# Patient Record
Sex: FEMALE | Race: WHITE | NOT HISPANIC OR LATINO | Employment: OTHER | URBAN - METROPOLITAN AREA
[De-identification: names, ages, dates, MRNs, and addresses within clinical notes are randomized per-mention and may not be internally consistent; named-entity substitution may affect disease eponyms.]

---

## 2019-01-01 ENCOUNTER — OFFICE VISIT (OUTPATIENT)
Dept: PALLIATIVE MEDICINE | Facility: CLINIC | Age: 64
End: 2019-01-01
Payer: COMMERCIAL

## 2019-01-01 ENCOUNTER — APPOINTMENT (OUTPATIENT)
Dept: RADIOLOGY | Facility: HOSPITAL | Age: 64
DRG: 948 | End: 2019-01-01
Payer: COMMERCIAL

## 2019-01-01 ENCOUNTER — HOSPITAL ENCOUNTER (OUTPATIENT)
Facility: HOSPITAL | Age: 64
Setting detail: OUTPATIENT SURGERY
Discharge: HOME | End: 2019-04-23
Attending: INTERNAL MEDICINE | Admitting: INTERNAL MEDICINE
Payer: COMMERCIAL

## 2019-01-01 ENCOUNTER — TELEPHONE (OUTPATIENT)
Dept: PALLIATIVE MEDICINE | Facility: CLINIC | Age: 64
End: 2019-01-01

## 2019-01-01 ENCOUNTER — TELEPHONE (OUTPATIENT)
Dept: HEMATOLOGY ONCOLOGY | Facility: CLINIC | Age: 64
End: 2019-01-01

## 2019-01-01 ENCOUNTER — APPOINTMENT (EMERGENCY)
Dept: CT IMAGING | Facility: HOSPITAL | Age: 64
End: 2019-01-01
Payer: COMMERCIAL

## 2019-01-01 ENCOUNTER — HOSPITAL ENCOUNTER (OUTPATIENT)
Dept: RADIOLOGY | Facility: HOSPITAL | Age: 64
Discharge: HOME | End: 2019-04-25
Attending: RADIOLOGY

## 2019-01-01 ENCOUNTER — HOSPITAL ENCOUNTER (OUTPATIENT)
Dept: MAMMOGRAPHY | Facility: CLINIC | Age: 64
Discharge: HOME | End: 2019-05-02
Payer: COMMERCIAL

## 2019-01-01 ENCOUNTER — TRANSCRIBE ORDERS (OUTPATIENT)
Dept: ADMINISTRATIVE | Facility: HOSPITAL | Age: 64
End: 2019-01-01

## 2019-01-01 ENCOUNTER — APPOINTMENT (INPATIENT)
Dept: RADIOLOGY | Facility: HOSPITAL | Age: 64
DRG: 177 | End: 2019-01-01
Payer: COMMERCIAL

## 2019-01-01 ENCOUNTER — APPOINTMENT (OUTPATIENT)
Dept: LAB | Facility: CLINIC | Age: 64
End: 2019-01-01
Payer: COMMERCIAL

## 2019-01-01 ENCOUNTER — DOCUMENTATION (OUTPATIENT)
Dept: INFUSION CENTER | Facility: HOSPITAL | Age: 64
End: 2019-01-01

## 2019-01-01 ENCOUNTER — APPOINTMENT (INPATIENT)
Dept: MRI IMAGING | Facility: HOSPITAL | Age: 64
DRG: 948 | End: 2019-01-01
Payer: COMMERCIAL

## 2019-01-01 ENCOUNTER — CONSULT (OUTPATIENT)
Dept: NEUROLOGY | Facility: CLINIC | Age: 64
End: 2019-01-01
Payer: COMMERCIAL

## 2019-01-01 ENCOUNTER — TELEPHONE (OUTPATIENT)
Dept: NEUROLOGY | Facility: CLINIC | Age: 64
End: 2019-01-01

## 2019-01-01 ENCOUNTER — APPOINTMENT (INPATIENT)
Dept: RADIOLOGY | Facility: HOSPITAL | Age: 64
DRG: 948 | End: 2019-01-01
Payer: COMMERCIAL

## 2019-01-01 ENCOUNTER — OFFICE VISIT (OUTPATIENT)
Dept: HEMATOLOGY ONCOLOGY | Facility: CLINIC | Age: 64
End: 2019-01-01
Payer: COMMERCIAL

## 2019-01-01 ENCOUNTER — APPOINTMENT (EMERGENCY)
Dept: RADIOLOGY | Facility: HOSPITAL | Age: 64
DRG: 948 | End: 2019-01-01
Payer: COMMERCIAL

## 2019-01-01 ENCOUNTER — HOSPITAL ENCOUNTER (OUTPATIENT)
Dept: INFUSION CENTER | Facility: CLINIC | Age: 64
Discharge: HOME | End: 2019-06-26
Payer: COMMERCIAL

## 2019-01-01 ENCOUNTER — OFFICE VISIT (OUTPATIENT)
Dept: GASTROENTEROLOGY | Facility: CLINIC | Age: 64
End: 2019-01-01
Payer: COMMERCIAL

## 2019-01-01 ENCOUNTER — APPOINTMENT (EMERGENCY)
Dept: CT IMAGING | Facility: HOSPITAL | Age: 64
DRG: 177 | End: 2019-01-01
Payer: COMMERCIAL

## 2019-01-01 ENCOUNTER — CONSULT (OUTPATIENT)
Dept: HEMATOLOGY ONCOLOGY | Facility: CLINIC | Age: 64
End: 2019-01-01
Payer: COMMERCIAL

## 2019-01-01 ENCOUNTER — HOSPITAL ENCOUNTER (OUTPATIENT)
Dept: RADIOLOGY | Facility: HOSPITAL | Age: 64
Discharge: HOME | DRG: 948 | End: 2019-06-11
Payer: COMMERCIAL

## 2019-01-01 ENCOUNTER — ANESTHESIA EVENT (OUTPATIENT)
Dept: PERIOP | Facility: HOSPITAL | Age: 64
End: 2019-01-01
Payer: COMMERCIAL

## 2019-01-01 ENCOUNTER — HOSPITAL ENCOUNTER (EMERGENCY)
Facility: HOSPITAL | Age: 64
Discharge: HOME | End: 2019-06-07
Attending: EMERGENCY MEDICINE | Admitting: EMERGENCY MEDICINE
Payer: COMMERCIAL

## 2019-01-01 ENCOUNTER — HOSPITAL ENCOUNTER (OUTPATIENT)
Dept: CT IMAGING | Facility: CLINIC | Age: 64
Discharge: HOME | End: 2019-05-01
Payer: COMMERCIAL

## 2019-01-01 ENCOUNTER — DOCUMENTATION (OUTPATIENT)
Dept: MEDSURG UNIT | Facility: HOSPITAL | Age: 64
End: 2019-01-01

## 2019-01-01 ENCOUNTER — APPOINTMENT (INPATIENT)
Dept: INTERVENTIONAL RADIOLOGY/VASCULAR | Facility: HOSPITAL | Age: 64
DRG: 948 | End: 2019-01-01
Attending: INTERNAL MEDICINE
Payer: COMMERCIAL

## 2019-01-01 ENCOUNTER — HOSPITAL ENCOUNTER (OUTPATIENT)
Dept: INFUSION CENTER | Facility: CLINIC | Age: 64
Discharge: HOME | End: 2019-06-28
Payer: COMMERCIAL

## 2019-01-01 ENCOUNTER — HOSPITAL ENCOUNTER (OUTPATIENT)
Dept: NEUROLOGY | Facility: HOSPITAL | Age: 64
Discharge: HOME | End: 2019-05-23
Attending: PSYCHIATRY & NEUROLOGY
Payer: COMMERCIAL

## 2019-01-01 ENCOUNTER — APPOINTMENT (OUTPATIENT)
Dept: RADIOLOGY | Facility: HOSPITAL | Age: 64
DRG: 177 | End: 2019-01-01
Payer: COMMERCIAL

## 2019-01-01 ENCOUNTER — HOSPITAL ENCOUNTER (OUTPATIENT)
Dept: RADIOLOGY | Age: 64
Discharge: HOME | End: 2019-06-03
Payer: COMMERCIAL

## 2019-01-01 ENCOUNTER — TELEPHONE (OUTPATIENT)
Dept: GASTROENTEROLOGY | Facility: CLINIC | Age: 64
End: 2019-01-01

## 2019-01-01 ENCOUNTER — ANESTHESIA (OUTPATIENT)
Dept: PERIOP | Facility: HOSPITAL | Age: 64
End: 2019-01-01
Payer: COMMERCIAL

## 2019-01-01 ENCOUNTER — HOSPITAL ENCOUNTER (INPATIENT)
Facility: HOSPITAL | Age: 64
LOS: 9 days | DRG: 177 | End: 2019-07-08
Attending: EMERGENCY MEDICINE | Admitting: FAMILY MEDICINE
Payer: COMMERCIAL

## 2019-01-01 ENCOUNTER — HOSPITAL ENCOUNTER (INPATIENT)
Facility: HOSPITAL | Age: 64
LOS: 5 days | Discharge: HOME | DRG: 948 | End: 2019-06-23
Attending: EMERGENCY MEDICINE | Admitting: INTERNAL MEDICINE
Payer: COMMERCIAL

## 2019-01-01 ENCOUNTER — HOSPITAL ENCOUNTER (OUTPATIENT)
Dept: CT IMAGING | Facility: HOSPITAL | Age: 64
Discharge: HOME | End: 2019-06-11
Attending: INTERNAL MEDICINE | Admitting: RADIOLOGY
Payer: COMMERCIAL

## 2019-01-01 VITALS
DIASTOLIC BLOOD PRESSURE: 70 MMHG | HEART RATE: 90 BPM | TEMPERATURE: 98 F | SYSTOLIC BLOOD PRESSURE: 137 MMHG | RESPIRATION RATE: 20 BRPM

## 2019-01-01 VITALS
TEMPERATURE: 97.7 F | WEIGHT: 94.8 LBS | HEART RATE: 66 BPM | SYSTOLIC BLOOD PRESSURE: 98 MMHG | OXYGEN SATURATION: 97 % | HEIGHT: 61 IN | BODY MASS INDEX: 17.9 KG/M2 | RESPIRATION RATE: 18 BRPM | DIASTOLIC BLOOD PRESSURE: 64 MMHG

## 2019-01-01 VITALS
WEIGHT: 98.99 LBS | TEMPERATURE: 97.7 F | SYSTOLIC BLOOD PRESSURE: 102 MMHG | BODY MASS INDEX: 18.22 KG/M2 | OXYGEN SATURATION: 79 % | HEIGHT: 62 IN | HEART RATE: 82 BPM | RESPIRATION RATE: 8 BRPM | DIASTOLIC BLOOD PRESSURE: 62 MMHG

## 2019-01-01 VITALS
HEIGHT: 62 IN | DIASTOLIC BLOOD PRESSURE: 90 MMHG | WEIGHT: 103.6 LBS | HEART RATE: 88 BPM | SYSTOLIC BLOOD PRESSURE: 126 MMHG | BODY MASS INDEX: 19.06 KG/M2

## 2019-01-01 VITALS
WEIGHT: 92 LBS | DIASTOLIC BLOOD PRESSURE: 56 MMHG | OXYGEN SATURATION: 98 % | BODY MASS INDEX: 17.38 KG/M2 | TEMPERATURE: 98 F | HEART RATE: 100 BPM | SYSTOLIC BLOOD PRESSURE: 110 MMHG

## 2019-01-01 VITALS
SYSTOLIC BLOOD PRESSURE: 112 MMHG | RESPIRATION RATE: 18 BRPM | BODY MASS INDEX: 16.71 KG/M2 | HEART RATE: 90 BPM | HEIGHT: 62 IN | WEIGHT: 90.83 LBS | DIASTOLIC BLOOD PRESSURE: 73 MMHG | TEMPERATURE: 98.1 F

## 2019-01-01 VITALS
WEIGHT: 98.5 LBS | HEART RATE: 101 BPM | HEIGHT: 61 IN | RESPIRATION RATE: 16 BRPM | BODY MASS INDEX: 18.6 KG/M2 | TEMPERATURE: 97.7 F | OXYGEN SATURATION: 98 %

## 2019-01-01 VITALS
SYSTOLIC BLOOD PRESSURE: 94 MMHG | TEMPERATURE: 98.2 F | DIASTOLIC BLOOD PRESSURE: 60 MMHG | OXYGEN SATURATION: 98 % | BODY MASS INDEX: 17.86 KG/M2 | HEART RATE: 78 BPM | HEIGHT: 61 IN | WEIGHT: 94.58 LBS | RESPIRATION RATE: 18 BRPM

## 2019-01-01 VITALS — RESPIRATION RATE: 16 BRPM | BODY MASS INDEX: 19.45 KG/M2 | WEIGHT: 103 LBS | TEMPERATURE: 97.6 F | HEIGHT: 61 IN

## 2019-01-01 VITALS
DIASTOLIC BLOOD PRESSURE: 74 MMHG | BODY MASS INDEX: 17.94 KG/M2 | HEART RATE: 94 BPM | TEMPERATURE: 97.9 F | HEIGHT: 61 IN | RESPIRATION RATE: 16 BRPM | WEIGHT: 95 LBS | OXYGEN SATURATION: 99 % | SYSTOLIC BLOOD PRESSURE: 100 MMHG

## 2019-01-01 VITALS
SYSTOLIC BLOOD PRESSURE: 105 MMHG | RESPIRATION RATE: 18 BRPM | TEMPERATURE: 97.5 F | HEART RATE: 82 BPM | BODY MASS INDEX: 17.69 KG/M2 | DIASTOLIC BLOOD PRESSURE: 64 MMHG | OXYGEN SATURATION: 97 % | HEIGHT: 61 IN | WEIGHT: 93.7 LBS

## 2019-01-01 VITALS
HEIGHT: 64 IN | BODY MASS INDEX: 18.1 KG/M2 | DIASTOLIC BLOOD PRESSURE: 80 MMHG | HEART RATE: 88 BPM | RESPIRATION RATE: 16 BRPM | WEIGHT: 106 LBS | SYSTOLIC BLOOD PRESSURE: 124 MMHG

## 2019-01-01 VITALS
BODY MASS INDEX: 18.86 KG/M2 | SYSTOLIC BLOOD PRESSURE: 104 MMHG | HEART RATE: 77 BPM | WEIGHT: 99.87 LBS | RESPIRATION RATE: 16 BRPM | HEIGHT: 61 IN | TEMPERATURE: 97.8 F | DIASTOLIC BLOOD PRESSURE: 71 MMHG | OXYGEN SATURATION: 99 %

## 2019-01-01 VITALS — BODY MASS INDEX: 18.31 KG/M2 | HEIGHT: 61 IN | WEIGHT: 97 LBS

## 2019-01-01 DIAGNOSIS — C15.4 MALIGNANT NEOPLASM OF MIDDLE THIRD OF ESOPHAGUS (HCC): Primary | ICD-10-CM

## 2019-01-01 DIAGNOSIS — R52 INTRACTABLE PAIN: Primary | ICD-10-CM

## 2019-01-01 DIAGNOSIS — Z72.0 TOBACCO ABUSE: ICD-10-CM

## 2019-01-01 DIAGNOSIS — Z85.3 HISTORY OF BREAST CANCER: ICD-10-CM

## 2019-01-01 DIAGNOSIS — G40.909 SEIZURE DISORDER (HCC): Primary | ICD-10-CM

## 2019-01-01 DIAGNOSIS — R49.1 LOSS OF VOICE: Primary | ICD-10-CM

## 2019-01-01 DIAGNOSIS — C15.4 MALIGNANT NEOPLASM OF MIDDLE THIRD OF ESOPHAGUS (HCC): ICD-10-CM

## 2019-01-01 DIAGNOSIS — K21.9 GASTRO-ESOPHAGEAL REFLUX DISEASE WITHOUT ESOPHAGITIS: ICD-10-CM

## 2019-01-01 DIAGNOSIS — G40.909 SEIZURE DISORDER (HCC): ICD-10-CM

## 2019-01-01 DIAGNOSIS — G89.3 CANCER ASSOCIATED PAIN: ICD-10-CM

## 2019-01-01 DIAGNOSIS — R53.1 WEAKNESS: ICD-10-CM

## 2019-01-01 DIAGNOSIS — J69.0 ASPIRATION PNEUMONIA (HCC): ICD-10-CM

## 2019-01-01 DIAGNOSIS — C15.9 ESOPHAGEAL CANCER (HCC): ICD-10-CM

## 2019-01-01 DIAGNOSIS — R10.84 GENERALIZED ABDOMINAL PAIN: Primary | ICD-10-CM

## 2019-01-01 DIAGNOSIS — L03.313 CELLULITIS OF CHEST WALL: ICD-10-CM

## 2019-01-01 DIAGNOSIS — Z76.89 REFERRAL OF PATIENT WITHOUT EXAMINATION OR TREATMENT: ICD-10-CM

## 2019-01-01 DIAGNOSIS — J95.811 PNEUMOTHORAX AFTER BIOPSY: ICD-10-CM

## 2019-01-01 DIAGNOSIS — Z51.11 ENCOUNTER FOR CHEMOTHERAPY MANAGEMENT: ICD-10-CM

## 2019-01-01 DIAGNOSIS — K52.9 COLITIS: ICD-10-CM

## 2019-01-01 DIAGNOSIS — R10.84 GENERALIZED ABDOMINAL PAIN: ICD-10-CM

## 2019-01-01 DIAGNOSIS — G89.3 CANCER ASSOCIATED PAIN: Primary | ICD-10-CM

## 2019-01-01 DIAGNOSIS — Z85.01 PERSONAL HISTORY OF MALIGNANT NEOPLASM OF ESOPHAGUS: ICD-10-CM

## 2019-01-01 DIAGNOSIS — R10.9 CHRONIC ABDOMINAL PAIN: Primary | ICD-10-CM

## 2019-01-01 DIAGNOSIS — E44.0 MODERATE PROTEIN-CALORIE MALNUTRITION (HCC): ICD-10-CM

## 2019-01-01 DIAGNOSIS — E86.0 DEHYDRATION: ICD-10-CM

## 2019-01-01 DIAGNOSIS — J95.811 PNEUMOTHORAX OF LEFT LUNG AFTER BIOPSY: ICD-10-CM

## 2019-01-01 DIAGNOSIS — J90 PLEURAL EFFUSION, LEFT: ICD-10-CM

## 2019-01-01 DIAGNOSIS — R10.9 ABDOMINAL PAIN: ICD-10-CM

## 2019-01-01 DIAGNOSIS — T40.2X5A THERAPEUTIC OPIOID-INDUCED CONSTIPATION (OIC): ICD-10-CM

## 2019-01-01 DIAGNOSIS — E87.6 HYPOKALEMIA: ICD-10-CM

## 2019-01-01 DIAGNOSIS — R11.2 INTRACTABLE NAUSEA AND VOMITING: Primary | ICD-10-CM

## 2019-01-01 DIAGNOSIS — G89.29 CHRONIC ABDOMINAL PAIN: Primary | ICD-10-CM

## 2019-01-01 DIAGNOSIS — K21.9 GASTROESOPHAGEAL REFLUX DISEASE, ESOPHAGITIS PRESENCE NOT SPECIFIED: Primary | ICD-10-CM

## 2019-01-01 DIAGNOSIS — Z85.01 HISTORY OF ESOPHAGEAL CANCER: ICD-10-CM

## 2019-01-01 DIAGNOSIS — K59.03 THERAPEUTIC OPIOID-INDUCED CONSTIPATION (OIC): ICD-10-CM

## 2019-01-01 LAB
ALBUMIN SERPL BCP-MCNC: 2 G/DL (ref 3.5–5)
ALBUMIN SERPL BCP-MCNC: 2.4 G/DL (ref 3.5–5)
ALBUMIN SERPL BCP-MCNC: 2.7 G/DL (ref 3.5–5)
ALBUMIN SERPL BCP-MCNC: 3 G/DL (ref 3.5–5)
ALBUMIN SERPL BCP-MCNC: 3.3 G/DL (ref 3.5–5)
ALBUMIN SERPL BCP-MCNC: 3.4 G/DL (ref 3.5–5)
ALP SERPL-CCNC: 104 U/L (ref 46–116)
ALP SERPL-CCNC: 65 U/L (ref 46–116)
ALP SERPL-CCNC: 76 U/L (ref 46–116)
ALP SERPL-CCNC: 84 U/L (ref 46–116)
ALP SERPL-CCNC: 96 U/L (ref 46–116)
ALP SERPL-CCNC: 97 U/L (ref 46–116)
ALT SERPL W P-5'-P-CCNC: 10 U/L (ref 12–78)
ALT SERPL W P-5'-P-CCNC: 10 U/L (ref 12–78)
ALT SERPL W P-5'-P-CCNC: 14 U/L (ref 12–78)
ALT SERPL W P-5'-P-CCNC: 6 U/L (ref 12–78)
ALT SERPL W P-5'-P-CCNC: 8 U/L (ref 12–78)
ALT SERPL W P-5'-P-CCNC: 8 U/L (ref 12–78)
ANION GAP SERPL CALCULATED.3IONS-SCNC: 10 MMOL/L (ref 4–13)
ANION GAP SERPL CALCULATED.3IONS-SCNC: 11 MMOL/L (ref 4–13)
ANION GAP SERPL CALCULATED.3IONS-SCNC: 12 MMOL/L (ref 4–13)
ANION GAP SERPL CALCULATED.3IONS-SCNC: 4 MMOL/L (ref 4–13)
ANION GAP SERPL CALCULATED.3IONS-SCNC: 5 MMOL/L (ref 4–13)
ANION GAP SERPL CALCULATED.3IONS-SCNC: 6 MMOL/L (ref 4–13)
ANION GAP SERPL CALCULATED.3IONS-SCNC: 8 MMOL/L (ref 4–13)
ANION GAP SERPL CALCULATED.3IONS-SCNC: 8 MMOL/L (ref 4–13)
ANION GAP SERPL CALCULATED.3IONS-SCNC: 9 MMOL/L (ref 4–13)
AST SERPL W P-5'-P-CCNC: 12 U/L (ref 5–45)
AST SERPL W P-5'-P-CCNC: 13 U/L (ref 5–45)
AST SERPL W P-5'-P-CCNC: 14 U/L (ref 5–45)
AST SERPL W P-5'-P-CCNC: 15 U/L (ref 5–45)
AST SERPL W P-5'-P-CCNC: 19 U/L (ref 5–45)
AST SERPL W P-5'-P-CCNC: 27 U/L (ref 5–45)
ATRIAL RATE: 92 BPM
BACTERIA BLD CULT: NORMAL
BACTERIA BLD CULT: NORMAL
BACTERIA UR QL AUTO: ABNORMAL /HPF
BACTERIA UR QL AUTO: ABNORMAL /HPF
BASOPHILS # BLD AUTO: 0.01 THOUSANDS/ΜL (ref 0–0.1)
BASOPHILS # BLD AUTO: 0.01 THOUSANDS/ΜL (ref 0–0.1)
BASOPHILS # BLD AUTO: 0.02 THOUSANDS/ΜL (ref 0–0.1)
BASOPHILS # BLD AUTO: 0.03 THOUSANDS/ΜL (ref 0–0.1)
BASOPHILS # BLD AUTO: 0.04 THOUSANDS/ΜL (ref 0–0.1)
BASOPHILS # BLD AUTO: 0.05 THOUSANDS/ΜL (ref 0–0.1)
BASOPHILS # BLD AUTO: 0.05 THOUSANDS/ΜL (ref 0–0.1)
BASOPHILS # BLD AUTO: 0.07 THOUSANDS/ΜL (ref 0–0.1)
BASOPHILS # BLD MANUAL: 0 THOUSAND/UL (ref 0–0.1)
BASOPHILS # BLD MANUAL: 0 THOUSAND/UL (ref 0–0.1)
BASOPHILS NFR BLD AUTO: 0 % (ref 0–1)
BASOPHILS NFR BLD AUTO: 1 % (ref 0–1)
BASOPHILS NFR MAR MANUAL: 0 % (ref 0–1)
BASOPHILS NFR MAR MANUAL: 0 % (ref 0–1)
BILIRUB DIRECT SERPL-MCNC: 0.1 MG/DL (ref 0–0.2)
BILIRUB DIRECT SERPL-MCNC: 0.11 MG/DL (ref 0–0.2)
BILIRUB SERPL-MCNC: 0.34 MG/DL (ref 0.2–1)
BILIRUB SERPL-MCNC: 0.4 MG/DL (ref 0.2–1)
BILIRUB SERPL-MCNC: 0.4 MG/DL (ref 0.2–1)
BILIRUB SERPL-MCNC: 0.5 MG/DL (ref 0.2–1)
BILIRUB SERPL-MCNC: 0.5 MG/DL (ref 0.2–1)
BILIRUB SERPL-MCNC: 0.7 MG/DL (ref 0.2–1)
BILIRUB UR QL STRIP: NEGATIVE
BILIRUB UR QL STRIP: NEGATIVE
BUN SERPL-MCNC: 10 MG/DL (ref 5–25)
BUN SERPL-MCNC: 10 MG/DL (ref 5–25)
BUN SERPL-MCNC: 11 MG/DL (ref 5–25)
BUN SERPL-MCNC: 13 MG/DL (ref 5–25)
BUN SERPL-MCNC: 14 MG/DL (ref 5–25)
BUN SERPL-MCNC: 16 MG/DL (ref 5–25)
BUN SERPL-MCNC: 17 MG/DL (ref 5–25)
BUN SERPL-MCNC: 18 MG/DL (ref 5–25)
BUN SERPL-MCNC: 5 MG/DL (ref 5–25)
BUN SERPL-MCNC: 5 MG/DL (ref 5–25)
BUN SERPL-MCNC: 7 MG/DL (ref 5–25)
BUN SERPL-MCNC: 7 MG/DL (ref 5–25)
BUN SERPL-MCNC: 8 MG/DL (ref 5–25)
BUN SERPL-MCNC: 9 MG/DL (ref 5–25)
CALCIUM SERPL-MCNC: 10.1 MG/DL (ref 8.3–10.1)
CALCIUM SERPL-MCNC: 10.3 MG/DL (ref 8.3–10.1)
CALCIUM SERPL-MCNC: 10.6 MG/DL (ref 8.3–10.1)
CALCIUM SERPL-MCNC: 10.8 MG/DL (ref 8.3–10.1)
CALCIUM SERPL-MCNC: 9.1 MG/DL (ref 8.3–10.1)
CALCIUM SERPL-MCNC: 9.2 MG/DL (ref 8.3–10.1)
CALCIUM SERPL-MCNC: 9.3 MG/DL (ref 8.3–10.1)
CALCIUM SERPL-MCNC: 9.3 MG/DL (ref 8.3–10.1)
CALCIUM SERPL-MCNC: 9.4 MG/DL (ref 8.3–10.1)
CALCIUM SERPL-MCNC: 9.5 MG/DL (ref 8.3–10.1)
CALCIUM SERPL-MCNC: 9.5 MG/DL (ref 8.3–10.1)
CALCIUM SERPL-MCNC: 9.6 MG/DL (ref 8.3–10.1)
CALCIUM SERPL-MCNC: 9.7 MG/DL (ref 8.3–10.1)
CALCIUM SERPL-MCNC: 9.8 MG/DL (ref 8.3–10.1)
CALCIUM SERPL-MCNC: 9.9 MG/DL (ref 8.3–10.1)
CHLORIDE SERPL-SCNC: 100 MMOL/L (ref 100–108)
CHLORIDE SERPL-SCNC: 101 MMOL/L (ref 100–108)
CHLORIDE SERPL-SCNC: 102 MMOL/L (ref 100–108)
CHLORIDE SERPL-SCNC: 102 MMOL/L (ref 100–108)
CHLORIDE SERPL-SCNC: 103 MMOL/L (ref 100–108)
CHLORIDE SERPL-SCNC: 103 MMOL/L (ref 100–108)
CHLORIDE SERPL-SCNC: 105 MMOL/L (ref 100–108)
CHLORIDE SERPL-SCNC: 108 MMOL/L (ref 100–108)
CHLORIDE SERPL-SCNC: 94 MMOL/L (ref 100–108)
CHLORIDE SERPL-SCNC: 97 MMOL/L (ref 100–108)
CHLORIDE SERPL-SCNC: 98 MMOL/L (ref 100–108)
CHLORIDE SERPL-SCNC: 99 MMOL/L (ref 100–108)
CHLORIDE SERPL-SCNC: 99 MMOL/L (ref 100–108)
CLARITY UR: CLEAR
CLARITY UR: CLEAR
CO2 SERPL-SCNC: 27 MMOL/L (ref 21–32)
CO2 SERPL-SCNC: 27 MMOL/L (ref 21–32)
CO2 SERPL-SCNC: 28 MMOL/L (ref 21–32)
CO2 SERPL-SCNC: 29 MMOL/L (ref 21–32)
CO2 SERPL-SCNC: 31 MMOL/L (ref 21–32)
CO2 SERPL-SCNC: 32 MMOL/L (ref 21–32)
CO2 SERPL-SCNC: 33 MMOL/L (ref 21–32)
CO2 SERPL-SCNC: 34 MMOL/L (ref 21–32)
CO2 SERPL-SCNC: 34 MMOL/L (ref 21–32)
CO2 SERPL-SCNC: 36 MMOL/L (ref 21–32)
CO2 SERPL-SCNC: 36 MMOL/L (ref 21–32)
COLOR UR: YELLOW
COLOR UR: YELLOW
CREAT SERPL-MCNC: 0.44 MG/DL (ref 0.6–1.3)
CREAT SERPL-MCNC: 0.46 MG/DL (ref 0.6–1.3)
CREAT SERPL-MCNC: 0.53 MG/DL (ref 0.6–1.3)
CREAT SERPL-MCNC: 0.57 MG/DL (ref 0.6–1.3)
CREAT SERPL-MCNC: 0.58 MG/DL (ref 0.6–1.3)
CREAT SERPL-MCNC: 0.59 MG/DL (ref 0.6–1.3)
CREAT SERPL-MCNC: 0.59 MG/DL (ref 0.6–1.3)
CREAT SERPL-MCNC: 0.6 MG/DL (ref 0.6–1.3)
CREAT SERPL-MCNC: 0.61 MG/DL (ref 0.6–1.3)
CREAT SERPL-MCNC: 0.63 MG/DL (ref 0.6–1.3)
CREAT SERPL-MCNC: 0.64 MG/DL (ref 0.6–1.3)
CREAT SERPL-MCNC: 0.68 MG/DL (ref 0.6–1.3)
CREAT SERPL-MCNC: 0.72 MG/DL (ref 0.6–1.3)
EOSINOPHIL # BLD AUTO: 0 THOUSAND/ΜL (ref 0–0.61)
EOSINOPHIL # BLD AUTO: 0.08 THOUSAND/ΜL (ref 0–0.61)
EOSINOPHIL # BLD AUTO: 0.19 THOUSAND/ΜL (ref 0–0.61)
EOSINOPHIL # BLD AUTO: 0.28 THOUSAND/ΜL (ref 0–0.61)
EOSINOPHIL # BLD AUTO: 0.38 THOUSAND/ΜL (ref 0–0.61)
EOSINOPHIL # BLD MANUAL: 0 THOUSAND/UL (ref 0–0.4)
EOSINOPHIL # BLD MANUAL: 0 THOUSAND/UL (ref 0–0.4)
EOSINOPHIL NFR BLD AUTO: 0 % (ref 0–6)
EOSINOPHIL NFR BLD AUTO: 1 % (ref 0–6)
EOSINOPHIL NFR BLD AUTO: 2 % (ref 0–6)
EOSINOPHIL NFR BLD AUTO: 2 % (ref 0–6)
EOSINOPHIL NFR BLD AUTO: 3 % (ref 0–6)
EOSINOPHIL NFR BLD MANUAL: 0 % (ref 0–6)
EOSINOPHIL NFR BLD MANUAL: 0 % (ref 0–6)
ERYTHROCYTE [DISTWIDTH] IN BLOOD BY AUTOMATED COUNT: 11.5 % (ref 11.6–15.1)
ERYTHROCYTE [DISTWIDTH] IN BLOOD BY AUTOMATED COUNT: 11.6 % (ref 11.6–15.1)
ERYTHROCYTE [DISTWIDTH] IN BLOOD BY AUTOMATED COUNT: 11.6 % (ref 11.6–15.1)
ERYTHROCYTE [DISTWIDTH] IN BLOOD BY AUTOMATED COUNT: 11.7 % (ref 11.6–15.1)
ERYTHROCYTE [DISTWIDTH] IN BLOOD BY AUTOMATED COUNT: 11.8 % (ref 11.6–15.1)
ERYTHROCYTE [DISTWIDTH] IN BLOOD BY AUTOMATED COUNT: 11.8 % (ref 11.6–15.1)
ERYTHROCYTE [DISTWIDTH] IN BLOOD BY AUTOMATED COUNT: 11.9 % (ref 11.6–15.1)
ERYTHROCYTE [DISTWIDTH] IN BLOOD BY AUTOMATED COUNT: 12 % (ref 11.6–15.1)
ERYTHROCYTE [DISTWIDTH] IN BLOOD BY AUTOMATED COUNT: 12 % (ref 11.6–15.1)
ERYTHROCYTE [DISTWIDTH] IN BLOOD BY AUTOMATED COUNT: 12.2 % (ref 11.6–15.1)
GFR SERPL CREATININE-BSD FRML MDRD: 100 ML/MIN/1.73SQ M
GFR SERPL CREATININE-BSD FRML MDRD: 102 ML/MIN/1.73SQ M
GFR SERPL CREATININE-BSD FRML MDRD: 107 ML/MIN/1.73SQ M
GFR SERPL CREATININE-BSD FRML MDRD: 109 ML/MIN/1.73SQ M
GFR SERPL CREATININE-BSD FRML MDRD: 90 ML/MIN/1.73SQ M
GFR SERPL CREATININE-BSD FRML MDRD: 94 ML/MIN/1.73SQ M
GFR SERPL CREATININE-BSD FRML MDRD: 96 ML/MIN/1.73SQ M
GFR SERPL CREATININE-BSD FRML MDRD: 96 ML/MIN/1.73SQ M
GFR SERPL CREATININE-BSD FRML MDRD: 97 ML/MIN/1.73SQ M
GFR SERPL CREATININE-BSD FRML MDRD: 98 ML/MIN/1.73SQ M
GFR SERPL CREATININE-BSD FRML MDRD: 99 ML/MIN/1.73SQ M
GLUCOSE P FAST SERPL-MCNC: 107 MG/DL (ref 65–99)
GLUCOSE SERPL-MCNC: 101 MG/DL (ref 65–140)
GLUCOSE SERPL-MCNC: 103 MG/DL (ref 65–140)
GLUCOSE SERPL-MCNC: 103 MG/DL (ref 65–140)
GLUCOSE SERPL-MCNC: 111 MG/DL (ref 65–140)
GLUCOSE SERPL-MCNC: 111 MG/DL (ref 65–140)
GLUCOSE SERPL-MCNC: 115 MG/DL (ref 65–140)
GLUCOSE SERPL-MCNC: 116 MG/DL (ref 65–140)
GLUCOSE SERPL-MCNC: 117 MG/DL (ref 65–140)
GLUCOSE SERPL-MCNC: 117 MG/DL (ref 65–140)
GLUCOSE SERPL-MCNC: 121 MG/DL (ref 65–140)
GLUCOSE SERPL-MCNC: 126 MG/DL (ref 65–140)
GLUCOSE SERPL-MCNC: 128 MG/DL (ref 65–140)
GLUCOSE SERPL-MCNC: 129 MG/DL (ref 65–140)
GLUCOSE SERPL-MCNC: 158 MG/DL (ref 65–140)
GLUCOSE SERPL-MCNC: 83 MG/DL (ref 65–140)
GLUCOSE SERPL-MCNC: 85 MG/DL (ref 65–140)
GLUCOSE SERPL-MCNC: 87 MG/DL (ref 65–140)
GLUCOSE SERPL-MCNC: 88 MG/DL (ref 65–140)
GLUCOSE SERPL-MCNC: 90 MG/DL (ref 65–140)
GLUCOSE SERPL-MCNC: 90 MG/DL (ref 65–140)
GLUCOSE SERPL-MCNC: 92 MG/DL (ref 65–140)
GLUCOSE UR STRIP-MCNC: NEGATIVE MG/DL
GLUCOSE UR STRIP-MCNC: NEGATIVE MG/DL
HCT VFR BLD AUTO: 31.6 % (ref 34.8–46.1)
HCT VFR BLD AUTO: 32.8 % (ref 34.8–46.1)
HCT VFR BLD AUTO: 33.3 % (ref 34.8–46.1)
HCT VFR BLD AUTO: 34.8 % (ref 34.8–46.1)
HCT VFR BLD AUTO: 35.5 % (ref 34.8–46.1)
HCT VFR BLD AUTO: 36.2 % (ref 34.8–46.1)
HCT VFR BLD AUTO: 36.3 % (ref 34.8–46.1)
HCT VFR BLD AUTO: 36.5 % (ref 34.8–46.1)
HCT VFR BLD AUTO: 36.9 % (ref 34.8–46.1)
HCT VFR BLD AUTO: 38.1 % (ref 34.8–46.1)
HCT VFR BLD AUTO: 38.4 % (ref 34.8–46.1)
HCT VFR BLD AUTO: 42.1 % (ref 34.8–46.1)
HCT VFR BLD AUTO: 45.7 % (ref 34.8–46.1)
HGB BLD-MCNC: 10.4 G/DL (ref 11.5–15.4)
HGB BLD-MCNC: 10.7 G/DL (ref 11.5–15.4)
HGB BLD-MCNC: 11 G/DL (ref 11.5–15.4)
HGB BLD-MCNC: 11 G/DL (ref 11.5–15.4)
HGB BLD-MCNC: 11.2 G/DL (ref 11.5–15.4)
HGB BLD-MCNC: 11.2 G/DL (ref 11.5–15.4)
HGB BLD-MCNC: 11.8 G/DL (ref 11.5–15.4)
HGB BLD-MCNC: 11.8 G/DL (ref 11.5–15.4)
HGB BLD-MCNC: 12.1 G/DL (ref 11.5–15.4)
HGB BLD-MCNC: 12.1 G/DL (ref 11.5–15.4)
HGB BLD-MCNC: 12.4 G/DL (ref 11.5–15.4)
HGB BLD-MCNC: 12.7 G/DL (ref 11.5–15.4)
HGB BLD-MCNC: 12.9 G/DL (ref 11.5–15.4)
HGB BLD-MCNC: 14.2 G/DL (ref 11.5–15.4)
HGB BLD-MCNC: 15 G/DL (ref 11.5–15.4)
HGB UR QL STRIP.AUTO: NEGATIVE
HGB UR QL STRIP.AUTO: NEGATIVE
IMM GRANULOCYTES # BLD AUTO: 0.02 THOUSAND/UL (ref 0–0.2)
IMM GRANULOCYTES # BLD AUTO: 0.05 THOUSAND/UL (ref 0–0.2)
IMM GRANULOCYTES # BLD AUTO: 0.05 THOUSAND/UL (ref 0–0.2)
IMM GRANULOCYTES # BLD AUTO: 0.06 THOUSAND/UL (ref 0–0.2)
IMM GRANULOCYTES # BLD AUTO: 0.07 THOUSAND/UL (ref 0–0.2)
IMM GRANULOCYTES # BLD AUTO: 0.08 THOUSAND/UL (ref 0–0.2)
IMM GRANULOCYTES # BLD AUTO: 0.1 THOUSAND/UL (ref 0–0.2)
IMM GRANULOCYTES # BLD AUTO: 0.14 THOUSAND/UL (ref 0–0.2)
IMM GRANULOCYTES NFR BLD AUTO: 0 % (ref 0–2)
IMM GRANULOCYTES NFR BLD AUTO: 1 % (ref 0–2)
INR PPP: 0.98 (ref 0.86–1.17)
KETONES UR STRIP-MCNC: NEGATIVE MG/DL
KETONES UR STRIP-MCNC: NEGATIVE MG/DL
LACTATE SERPL-SCNC: 1.2 MMOL/L (ref 0.5–2)
LACTATE SERPL-SCNC: 1.5 MMOL/L (ref 0.5–2)
LEUKOCYTE ESTERASE UR QL STRIP: ABNORMAL
LEUKOCYTE ESTERASE UR QL STRIP: NEGATIVE
LIPASE SERPL-CCNC: 70 U/L (ref 73–393)
LYMPHOCYTES # BLD AUTO: 0.28 THOUSANDS/ΜL (ref 0.6–4.47)
LYMPHOCYTES # BLD AUTO: 0.3 THOUSAND/UL (ref 0.6–4.47)
LYMPHOCYTES # BLD AUTO: 0.32 THOUSANDS/ΜL (ref 0.6–4.47)
LYMPHOCYTES # BLD AUTO: 0.35 THOUSANDS/ΜL (ref 0.6–4.47)
LYMPHOCYTES # BLD AUTO: 0.39 THOUSANDS/ΜL (ref 0.6–4.47)
LYMPHOCYTES # BLD AUTO: 0.99 THOUSAND/UL (ref 0.6–4.47)
LYMPHOCYTES # BLD AUTO: 1.08 THOUSANDS/ΜL (ref 0.6–4.47)
LYMPHOCYTES # BLD AUTO: 1.22 THOUSANDS/ΜL (ref 0.6–4.47)
LYMPHOCYTES # BLD AUTO: 1.67 THOUSANDS/ΜL (ref 0.6–4.47)
LYMPHOCYTES # BLD AUTO: 1.68 THOUSANDS/ΜL (ref 0.6–4.47)
LYMPHOCYTES # BLD AUTO: 2 % (ref 14–44)
LYMPHOCYTES # BLD AUTO: 6 % (ref 14–44)
LYMPHOCYTES NFR BLD AUTO: 10 % (ref 14–44)
LYMPHOCYTES NFR BLD AUTO: 12 % (ref 14–44)
LYMPHOCYTES NFR BLD AUTO: 19 % (ref 14–44)
LYMPHOCYTES NFR BLD AUTO: 3 % (ref 14–44)
LYMPHOCYTES NFR BLD AUTO: 5 % (ref 14–44)
LYMPHOCYTES NFR BLD AUTO: 9 % (ref 14–44)
MAGNESIUM SERPL-MCNC: 1.4 MG/DL (ref 1.6–2.6)
MAGNESIUM SERPL-MCNC: 1.4 MG/DL (ref 1.6–2.6)
MAGNESIUM SERPL-MCNC: 1.5 MG/DL (ref 1.6–2.6)
MAGNESIUM SERPL-MCNC: 1.5 MG/DL (ref 1.6–2.6)
MAGNESIUM SERPL-MCNC: 1.7 MG/DL (ref 1.6–2.6)
MAGNESIUM SERPL-MCNC: 1.9 MG/DL (ref 1.6–2.6)
MCH RBC QN AUTO: 31.4 PG (ref 26.8–34.3)
MCH RBC QN AUTO: 31.6 PG (ref 26.8–34.3)
MCH RBC QN AUTO: 31.7 PG (ref 26.8–34.3)
MCH RBC QN AUTO: 31.8 PG (ref 26.8–34.3)
MCH RBC QN AUTO: 31.9 PG (ref 26.8–34.3)
MCH RBC QN AUTO: 31.9 PG (ref 26.8–34.3)
MCH RBC QN AUTO: 32 PG (ref 26.8–34.3)
MCH RBC QN AUTO: 32 PG (ref 26.8–34.3)
MCH RBC QN AUTO: 32.1 PG (ref 26.8–34.3)
MCH RBC QN AUTO: 32.1 PG (ref 26.8–34.3)
MCH RBC QN AUTO: 32.2 PG (ref 26.8–34.3)
MCH RBC QN AUTO: 32.2 PG (ref 26.8–34.3)
MCH RBC QN AUTO: 32.3 PG (ref 26.8–34.3)
MCH RBC QN AUTO: 32.8 PG (ref 26.8–34.3)
MCH RBC QN AUTO: 32.9 PG (ref 26.8–34.3)
MCHC RBC AUTO-ENTMCNC: 32.2 G/DL (ref 31.4–37.4)
MCHC RBC AUTO-ENTMCNC: 32.3 G/DL (ref 31.4–37.4)
MCHC RBC AUTO-ENTMCNC: 32.5 G/DL (ref 31.4–37.4)
MCHC RBC AUTO-ENTMCNC: 32.6 G/DL (ref 31.4–37.4)
MCHC RBC AUTO-ENTMCNC: 32.8 G/DL (ref 31.4–37.4)
MCHC RBC AUTO-ENTMCNC: 32.9 G/DL (ref 31.4–37.4)
MCHC RBC AUTO-ENTMCNC: 33 G/DL (ref 31.4–37.4)
MCHC RBC AUTO-ENTMCNC: 33.3 G/DL (ref 31.4–37.4)
MCHC RBC AUTO-ENTMCNC: 33.4 G/DL (ref 31.4–37.4)
MCHC RBC AUTO-ENTMCNC: 33.5 G/DL (ref 31.4–37.4)
MCHC RBC AUTO-ENTMCNC: 33.6 G/DL (ref 31.4–37.4)
MCHC RBC AUTO-ENTMCNC: 33.6 G/DL (ref 31.4–37.4)
MCHC RBC AUTO-ENTMCNC: 33.7 G/DL (ref 31.4–37.4)
MCHC RBC AUTO-ENTMCNC: 34.1 G/DL (ref 31.4–37.4)
MCHC RBC AUTO-ENTMCNC: 34.1 G/DL (ref 31.4–37.4)
MCV RBC AUTO: 100 FL (ref 82–98)
MCV RBC AUTO: 100 FL (ref 82–98)
MCV RBC AUTO: 93 FL (ref 82–98)
MCV RBC AUTO: 94 FL (ref 82–98)
MCV RBC AUTO: 95 FL (ref 82–98)
MCV RBC AUTO: 96 FL (ref 82–98)
MCV RBC AUTO: 97 FL (ref 82–98)
MCV RBC AUTO: 98 FL (ref 82–98)
MCV RBC AUTO: 98 FL (ref 82–98)
MONOCYTES # BLD AUTO: 0.07 THOUSAND/ΜL (ref 0.17–1.22)
MONOCYTES # BLD AUTO: 0.14 THOUSAND/ΜL (ref 0.17–1.22)
MONOCYTES # BLD AUTO: 0.2 THOUSAND/ΜL (ref 0.17–1.22)
MONOCYTES # BLD AUTO: 0.2 THOUSAND/ΜL (ref 0.17–1.22)
MONOCYTES # BLD AUTO: 0.3 THOUSAND/UL (ref 0–1.22)
MONOCYTES # BLD AUTO: 0.33 THOUSAND/UL (ref 0–1.22)
MONOCYTES # BLD AUTO: 0.87 THOUSAND/ΜL (ref 0.17–1.22)
MONOCYTES # BLD AUTO: 0.94 THOUSAND/ΜL (ref 0.17–1.22)
MONOCYTES # BLD AUTO: 1.16 THOUSAND/ΜL (ref 0.17–1.22)
MONOCYTES # BLD AUTO: 1.33 THOUSAND/ΜL (ref 0.17–1.22)
MONOCYTES NFR BLD AUTO: 1 % (ref 4–12)
MONOCYTES NFR BLD AUTO: 1 % (ref 4–12)
MONOCYTES NFR BLD AUTO: 10 % (ref 4–12)
MONOCYTES NFR BLD AUTO: 2 % (ref 4–12)
MONOCYTES NFR BLD AUTO: 2 % (ref 4–12)
MONOCYTES NFR BLD AUTO: 8 % (ref 4–12)
MONOCYTES NFR BLD: 2 % (ref 4–12)
MONOCYTES NFR BLD: 2 % (ref 4–12)
MUCOUS THREADS UR QL AUTO: ABNORMAL
NEUTROPHILS # BLD AUTO: 10.52 THOUSANDS/ΜL (ref 1.85–7.62)
NEUTROPHILS # BLD AUTO: 11.81 THOUSANDS/ΜL (ref 1.85–7.62)
NEUTROPHILS # BLD AUTO: 12.71 THOUSANDS/ΜL (ref 1.85–7.62)
NEUTROPHILS # BLD AUTO: 5.89 THOUSANDS/ΜL (ref 1.85–7.62)
NEUTROPHILS # BLD AUTO: 7.16 THOUSANDS/ΜL (ref 1.85–7.62)
NEUTROPHILS # BLD AUTO: 8.82 THOUSANDS/ΜL (ref 1.85–7.62)
NEUTROPHILS # BLD AUTO: 9.36 THOUSANDS/ΜL (ref 1.85–7.62)
NEUTROPHILS # BLD AUTO: 9.82 THOUSANDS/ΜL (ref 1.85–7.62)
NEUTROPHILS # BLD MANUAL: 14.6 THOUSAND/UL (ref 1.85–7.62)
NEUTROPHILS # BLD MANUAL: 15.17 THOUSAND/UL (ref 1.85–7.62)
NEUTS BAND NFR BLD MANUAL: 4 % (ref 0–8)
NEUTS BAND NFR BLD MANUAL: 4 % (ref 0–8)
NEUTS SEG NFR BLD AUTO: 68 % (ref 43–75)
NEUTS SEG NFR BLD AUTO: 77 % (ref 43–75)
NEUTS SEG NFR BLD AUTO: 78 % (ref 43–75)
NEUTS SEG NFR BLD AUTO: 79 % (ref 43–75)
NEUTS SEG NFR BLD AUTO: 88 % (ref 43–75)
NEUTS SEG NFR BLD AUTO: 92 % (ref 43–75)
NEUTS SEG NFR BLD AUTO: 93 % (ref 43–75)
NEUTS SEG NFR BLD AUTO: 94 % (ref 43–75)
NEUTS SEG NFR BLD AUTO: 94 % (ref 43–75)
NEUTS SEG NFR BLD AUTO: 95 % (ref 43–75)
NITRITE UR QL STRIP: NEGATIVE
NITRITE UR QL STRIP: NEGATIVE
NON-SQ EPI CELLS URNS QL MICRO: ABNORMAL /HPF
NON-SQ EPI CELLS URNS QL MICRO: ABNORMAL /HPF
NRBC BLD AUTO-RTO: 0 /100 WBCS
P AXIS: 45 DEGREES
PH UR STRIP.AUTO: 6.5 [PH]
PH UR STRIP.AUTO: 7 [PH]
PHOSPHATE SERPL-MCNC: 2.2 MG/DL (ref 2.3–4.1)
PLATELET # BLD AUTO: 220 THOUSANDS/UL (ref 149–390)
PLATELET # BLD AUTO: 227 THOUSANDS/UL (ref 149–390)
PLATELET # BLD AUTO: 231 THOUSANDS/UL (ref 149–390)
PLATELET # BLD AUTO: 267 THOUSANDS/UL (ref 149–390)
PLATELET # BLD AUTO: 281 THOUSANDS/UL (ref 149–390)
PLATELET # BLD AUTO: 373 THOUSANDS/UL (ref 149–390)
PLATELET # BLD AUTO: 376 THOUSANDS/UL (ref 149–390)
PLATELET # BLD AUTO: 400 THOUSANDS/UL (ref 149–390)
PLATELET # BLD AUTO: 404 THOUSANDS/UL (ref 149–390)
PLATELET # BLD AUTO: 421 THOUSANDS/UL (ref 149–390)
PLATELET # BLD AUTO: 462 THOUSANDS/UL (ref 149–390)
PLATELET # BLD AUTO: 475 THOUSANDS/UL (ref 149–390)
PLATELET # BLD AUTO: 479 THOUSANDS/UL (ref 149–390)
PLATELET # BLD AUTO: 480 THOUSANDS/UL (ref 149–390)
PLATELET # BLD AUTO: 501 THOUSANDS/UL (ref 149–390)
PLATELET BLD QL SMEAR: ABNORMAL
PLATELET BLD QL SMEAR: ADEQUATE
PMV BLD AUTO: 8.1 FL (ref 8.9–12.7)
PMV BLD AUTO: 8.1 FL (ref 8.9–12.7)
PMV BLD AUTO: 8.2 FL (ref 8.9–12.7)
PMV BLD AUTO: 8.4 FL (ref 8.9–12.7)
PMV BLD AUTO: 8.5 FL (ref 8.9–12.7)
PMV BLD AUTO: 8.8 FL (ref 8.9–12.7)
PMV BLD AUTO: 8.9 FL (ref 8.9–12.7)
PMV BLD AUTO: 9.3 FL (ref 8.9–12.7)
PMV BLD AUTO: 9.3 FL (ref 8.9–12.7)
POTASSIUM SERPL-SCNC: 1.9 MMOL/L (ref 3.5–5.3)
POTASSIUM SERPL-SCNC: 2 MMOL/L (ref 3.5–5.3)
POTASSIUM SERPL-SCNC: 2.6 MMOL/L (ref 3.5–5.3)
POTASSIUM SERPL-SCNC: 2.7 MMOL/L (ref 3.5–5.3)
POTASSIUM SERPL-SCNC: 3.3 MMOL/L (ref 3.5–5.3)
POTASSIUM SERPL-SCNC: 3.4 MMOL/L (ref 3.5–5.3)
POTASSIUM SERPL-SCNC: 3.4 MMOL/L (ref 3.5–5.3)
POTASSIUM SERPL-SCNC: 3.5 MMOL/L (ref 3.5–5.3)
POTASSIUM SERPL-SCNC: 3.6 MMOL/L (ref 3.5–5.3)
POTASSIUM SERPL-SCNC: 3.7 MMOL/L (ref 3.5–5.3)
POTASSIUM SERPL-SCNC: 3.8 MMOL/L (ref 3.5–5.3)
POTASSIUM SERPL-SCNC: 3.9 MMOL/L (ref 3.5–5.3)
POTASSIUM SERPL-SCNC: 4.1 MMOL/L (ref 3.5–5.3)
POTASSIUM SERPL-SCNC: 4.6 MMOL/L (ref 3.5–5.3)
POTASSIUM SERPL-SCNC: 4.7 MMOL/L (ref 3.5–5.3)
PR INTERVAL: 140 MS
PROCALCITONIN SERPL-MCNC: 0.19 NG/ML
PROT SERPL-MCNC: 5.9 G/DL (ref 6.4–8.2)
PROT SERPL-MCNC: 6.9 G/DL (ref 6.4–8.2)
PROT SERPL-MCNC: 7.4 G/DL (ref 6.4–8.2)
PROT SERPL-MCNC: 7.6 G/DL (ref 6.4–8.2)
PROT SERPL-MCNC: 7.7 G/DL (ref 6.4–8.2)
PROT SERPL-MCNC: 7.8 G/DL (ref 6.4–8.2)
PROT UR STRIP-MCNC: ABNORMAL MG/DL
PROT UR STRIP-MCNC: NEGATIVE MG/DL
PROTHROMBIN TIME: 12.9 SECONDS (ref 11.8–14.2)
QRS AXIS: 89 DEGREES
QRSD INTERVAL: 70 MS
QT INTERVAL: 358 MS
QTC INTERVAL: 442 MS
RBC # BLD AUTO: 3.28 MILLION/UL (ref 3.81–5.12)
RBC # BLD AUTO: 3.39 MILLION/UL (ref 3.81–5.12)
RBC # BLD AUTO: 3.43 MILLION/UL (ref 3.81–5.12)
RBC # BLD AUTO: 3.45 MILLION/UL (ref 3.81–5.12)
RBC # BLD AUTO: 3.47 MILLION/UL (ref 3.81–5.12)
RBC # BLD AUTO: 3.51 MILLION/UL (ref 3.81–5.12)
RBC # BLD AUTO: 3.71 MILLION/UL (ref 3.81–5.12)
RBC # BLD AUTO: 3.76 MILLION/UL (ref 3.81–5.12)
RBC # BLD AUTO: 3.78 MILLION/UL (ref 3.81–5.12)
RBC # BLD AUTO: 3.78 MILLION/UL (ref 3.81–5.12)
RBC # BLD AUTO: 3.86 MILLION/UL (ref 3.81–5.12)
RBC # BLD AUTO: 3.95 MILLION/UL (ref 3.81–5.12)
RBC # BLD AUTO: 4.01 MILLION/UL (ref 3.81–5.12)
RBC # BLD AUTO: 4.31 MILLION/UL (ref 3.81–5.12)
RBC # BLD AUTO: 4.58 MILLION/UL (ref 3.81–5.12)
RBC #/AREA URNS AUTO: ABNORMAL /HPF
RBC #/AREA URNS AUTO: ABNORMAL /HPF
SCAN RESULT: NORMAL
SODIUM SERPL-SCNC: 132 MMOL/L (ref 136–145)
SODIUM SERPL-SCNC: 135 MMOL/L (ref 136–145)
SODIUM SERPL-SCNC: 135 MMOL/L (ref 136–145)
SODIUM SERPL-SCNC: 136 MMOL/L (ref 136–145)
SODIUM SERPL-SCNC: 137 MMOL/L (ref 136–145)
SODIUM SERPL-SCNC: 138 MMOL/L (ref 136–145)
SODIUM SERPL-SCNC: 140 MMOL/L (ref 136–145)
SODIUM SERPL-SCNC: 141 MMOL/L (ref 136–145)
SODIUM SERPL-SCNC: 141 MMOL/L (ref 136–145)
SODIUM SERPL-SCNC: 142 MMOL/L (ref 136–145)
SODIUM SERPL-SCNC: 145 MMOL/L (ref 136–145)
SODIUM SERPL-SCNC: 145 MMOL/L (ref 136–145)
SODIUM SERPL-SCNC: 147 MMOL/L (ref 136–145)
SP GR UR STRIP.AUTO: 1.02 (ref 1–1.03)
SP GR UR STRIP.AUTO: <=1.005 (ref 1–1.03)
T WAVE AXIS: 49 DEGREES
TOTAL CELLS COUNTED SPEC: 100
TOTAL CELLS COUNTED SPEC: 100
UROBILINOGEN UR QL STRIP.AUTO: 0.2 E.U./DL
UROBILINOGEN UR QL STRIP.AUTO: 0.2 E.U./DL
VANCOMYCIN TROUGH SERPL-MCNC: 4 UG/ML (ref 10–20)
VANCOMYCIN TROUGH SERPL-MCNC: 9.4 UG/ML (ref 10–20)
VENTRICULAR RATE: 92 BPM
WBC # BLD AUTO: 10.48 THOUSAND/UL (ref 4.31–10.16)
WBC # BLD AUTO: 11.46 THOUSAND/UL (ref 4.31–10.16)
WBC # BLD AUTO: 12.11 THOUSAND/UL (ref 4.31–10.16)
WBC # BLD AUTO: 12.35 THOUSAND/UL (ref 4.31–10.16)
WBC # BLD AUTO: 12.38 THOUSAND/UL (ref 4.31–10.16)
WBC # BLD AUTO: 13.47 THOUSAND/UL (ref 4.31–10.16)
WBC # BLD AUTO: 13.7 THOUSAND/UL (ref 4.31–10.16)
WBC # BLD AUTO: 15.21 THOUSAND/UL (ref 4.31–10.16)
WBC # BLD AUTO: 16.49 THOUSAND/UL (ref 4.31–10.16)
WBC # BLD AUTO: 6.22 THOUSAND/UL (ref 4.31–10.16)
WBC # BLD AUTO: 7.65 THOUSAND/UL (ref 4.31–10.16)
WBC # BLD AUTO: 8.5 THOUSAND/UL (ref 4.31–10.16)
WBC # BLD AUTO: 8.72 THOUSAND/UL (ref 4.31–10.16)
WBC # BLD AUTO: 9.29 THOUSAND/UL (ref 4.31–10.16)
WBC # BLD AUTO: 9.39 THOUSAND/UL (ref 4.31–10.16)
WBC #/AREA URNS AUTO: ABNORMAL /HPF
WBC #/AREA URNS AUTO: ABNORMAL /HPF

## 2019-01-01 PROCEDURE — 82948 REAGENT STRIP/BLOOD GLUCOSE: CPT

## 2019-01-01 PROCEDURE — 77012 CT SCAN FOR NEEDLE BIOPSY: CPT

## 2019-01-01 PROCEDURE — 96375 TX/PRO/DX INJ NEW DRUG ADDON: CPT

## 2019-01-01 PROCEDURE — 99285 EMERGENCY DEPT VISIT HI MDM: CPT

## 2019-01-01 PROCEDURE — 99284 EMERGENCY DEPT VISIT MOD MDM: CPT

## 2019-01-01 PROCEDURE — 99244 OFF/OP CNSLTJ NEW/EST MOD 40: CPT | Performed by: INTERNAL MEDICINE

## 2019-01-01 PROCEDURE — 99232 SBSQ HOSP IP/OBS MODERATE 35: CPT | Performed by: INTERNAL MEDICINE

## 2019-01-01 PROCEDURE — G0498 CHEMO EXTEND IV INFUS W/PUMP: HCPCS

## 2019-01-01 PROCEDURE — 80048 BASIC METABOLIC PNL TOTAL CA: CPT | Performed by: INTERNAL MEDICINE

## 2019-01-01 PROCEDURE — 99254 IP/OBS CNSLTJ NEW/EST MOD 60: CPT | Performed by: INTERNAL MEDICINE

## 2019-01-01 PROCEDURE — 94760 N-INVAS EAR/PLS OXIMETRY 1: CPT

## 2019-01-01 PROCEDURE — 99220 PR INITIAL OBSERVATION CARE/DAY 70 MINUTES: CPT | Performed by: HOSPITALIST

## 2019-01-01 PROCEDURE — 96411 CHEMO IV PUSH ADDL DRUG: CPT

## 2019-01-01 PROCEDURE — 96368 THER/DIAG CONCURRENT INF: CPT

## 2019-01-01 PROCEDURE — 94668 MNPJ CHEST WALL SBSQ: CPT

## 2019-01-01 PROCEDURE — 99232 SBSQ HOSP IP/OBS MODERATE 35: CPT | Performed by: PHYSICIAN ASSISTANT

## 2019-01-01 PROCEDURE — 85007 BL SMEAR W/DIFF WBC COUNT: CPT | Performed by: EMERGENCY MEDICINE

## 2019-01-01 PROCEDURE — 80053 COMPREHEN METABOLIC PANEL: CPT | Performed by: FAMILY MEDICINE

## 2019-01-01 PROCEDURE — 88305 TISSUE EXAM BY PATHOLOGIST: CPT | Performed by: PATHOLOGY

## 2019-01-01 PROCEDURE — 85027 COMPLETE CBC AUTOMATED: CPT | Performed by: INTERNAL MEDICINE

## 2019-01-01 PROCEDURE — 85027 COMPLETE CBC AUTOMATED: CPT | Performed by: PHYSICIAN ASSISTANT

## 2019-01-01 PROCEDURE — 85027 COMPLETE CBC AUTOMATED: CPT | Performed by: EMERGENCY MEDICINE

## 2019-01-01 PROCEDURE — 71045 X-RAY EXAM CHEST 1 VIEW: CPT

## 2019-01-01 PROCEDURE — 96361 HYDRATE IV INFUSION ADD-ON: CPT

## 2019-01-01 PROCEDURE — 85610 PROTHROMBIN TIME: CPT | Performed by: RADIOLOGY

## 2019-01-01 PROCEDURE — 99291 CRITICAL CARE FIRST HOUR: CPT | Performed by: ANESTHESIOLOGY

## 2019-01-01 PROCEDURE — 99243 OFF/OP CNSLTJ NEW/EST LOW 30: CPT | Performed by: PSYCHIATRY & NEUROLOGY

## 2019-01-01 PROCEDURE — 85025 COMPLETE CBC W/AUTO DIFF WBC: CPT | Performed by: INTERNAL MEDICINE

## 2019-01-01 PROCEDURE — C9113 INJ PANTOPRAZOLE SODIUM, VIA: HCPCS | Performed by: PHYSICIAN ASSISTANT

## 2019-01-01 PROCEDURE — 32405 HB PERCUT BX LUNG/MEDIASTINUM: CPT

## 2019-01-01 PROCEDURE — 88341 IMHCHEM/IMCYTCHM EA ADD ANTB: CPT | Performed by: PATHOLOGY

## 2019-01-01 PROCEDURE — 43239 EGD BIOPSY SINGLE/MULTIPLE: CPT | Performed by: INTERNAL MEDICINE

## 2019-01-01 PROCEDURE — 96415 CHEMO IV INFUSION ADDL HR: CPT

## 2019-01-01 PROCEDURE — 74160 CT ABDOMEN W/CONTRAST: CPT

## 2019-01-01 PROCEDURE — 32405 PR BIOPSY LUNG/MEDIASTINUM PERCUTANEOUS NEEDLE: CPT | Performed by: RADIOLOGY

## 2019-01-01 PROCEDURE — 80053 COMPREHEN METABOLIC PANEL: CPT | Performed by: EMERGENCY MEDICINE

## 2019-01-01 PROCEDURE — 99283 EMERGENCY DEPT VISIT LOW MDM: CPT | Performed by: EMERGENCY MEDICINE

## 2019-01-01 PROCEDURE — 70553 MRI BRAIN STEM W/O & W/DYE: CPT

## 2019-01-01 PROCEDURE — 83735 ASSAY OF MAGNESIUM: CPT | Performed by: INTERNAL MEDICINE

## 2019-01-01 PROCEDURE — 77067 SCR MAMMO BI INCL CAD: CPT

## 2019-01-01 PROCEDURE — 96367 TX/PROPH/DG ADDL SEQ IV INF: CPT

## 2019-01-01 PROCEDURE — 36415 COLL VENOUS BLD VENIPUNCTURE: CPT | Performed by: EMERGENCY MEDICINE

## 2019-01-01 PROCEDURE — 99233 SBSQ HOSP IP/OBS HIGH 50: CPT | Performed by: INTERNAL MEDICINE

## 2019-01-01 PROCEDURE — 94640 AIRWAY INHALATION TREATMENT: CPT

## 2019-01-01 PROCEDURE — 88342 IMHCHEM/IMCYTCHM 1ST ANTB: CPT | Performed by: PATHOLOGY

## 2019-01-01 PROCEDURE — C9113 INJ PANTOPRAZOLE SODIUM, VIA: HCPCS | Performed by: EMERGENCY MEDICINE

## 2019-01-01 PROCEDURE — 84145 PROCALCITONIN (PCT): CPT | Performed by: INTERNAL MEDICINE

## 2019-01-01 PROCEDURE — 96374 THER/PROPH/DIAG INJ IV PUSH: CPT

## 2019-01-01 PROCEDURE — C1788 PORT, INDWELLING, IMP: HCPCS

## 2019-01-01 PROCEDURE — 85025 COMPLETE CBC W/AUTO DIFF WBC: CPT | Performed by: FAMILY MEDICINE

## 2019-01-01 PROCEDURE — 71046 X-RAY EXAM CHEST 2 VIEWS: CPT

## 2019-01-01 PROCEDURE — 78815 PET IMAGE W/CT SKULL-THIGH: CPT

## 2019-01-01 PROCEDURE — 85025 COMPLETE CBC W/AUTO DIFF WBC: CPT | Performed by: EMERGENCY MEDICINE

## 2019-01-01 PROCEDURE — 99232 SBSQ HOSP IP/OBS MODERATE 35: CPT | Performed by: FAMILY MEDICINE

## 2019-01-01 PROCEDURE — 93005 ELECTROCARDIOGRAM TRACING: CPT

## 2019-01-01 PROCEDURE — 84100 ASSAY OF PHOSPHORUS: CPT | Performed by: FAMILY MEDICINE

## 2019-01-01 PROCEDURE — 71260 CT THORAX DX C+: CPT

## 2019-01-01 PROCEDURE — 83735 ASSAY OF MAGNESIUM: CPT | Performed by: EMERGENCY MEDICINE

## 2019-01-01 PROCEDURE — 94660 CPAP INITIATION&MGMT: CPT

## 2019-01-01 PROCEDURE — 87040 BLOOD CULTURE FOR BACTERIA: CPT | Performed by: INTERNAL MEDICINE

## 2019-01-01 PROCEDURE — 99219 PR INITIAL OBSERVATION CARE/DAY 50 MINUTES: CPT | Performed by: INTERNAL MEDICINE

## 2019-01-01 PROCEDURE — 80053 COMPREHEN METABOLIC PANEL: CPT | Performed by: INTERNAL MEDICINE

## 2019-01-01 PROCEDURE — 76937 US GUIDE VASCULAR ACCESS: CPT | Performed by: RADIOLOGY

## 2019-01-01 PROCEDURE — 36561 INSERT TUNNELED CV CATH: CPT

## 2019-01-01 PROCEDURE — 77063 BREAST TOMOSYNTHESIS BI: CPT

## 2019-01-01 PROCEDURE — A9585 GADOBUTROL INJECTION: HCPCS | Performed by: INTERNAL MEDICINE

## 2019-01-01 PROCEDURE — 99222 1ST HOSP IP/OBS MODERATE 55: CPT | Performed by: INTERNAL MEDICINE

## 2019-01-01 PROCEDURE — 99214 OFFICE O/P EST MOD 30 MIN: CPT | Performed by: INTERNAL MEDICINE

## 2019-01-01 PROCEDURE — 99152 MOD SED SAME PHYS/QHP 5/>YRS: CPT | Performed by: RADIOLOGY

## 2019-01-01 PROCEDURE — 83605 ASSAY OF LACTIC ACID: CPT | Performed by: FAMILY MEDICINE

## 2019-01-01 PROCEDURE — 83605 ASSAY OF LACTIC ACID: CPT | Performed by: EMERGENCY MEDICINE

## 2019-01-01 PROCEDURE — 77012 CT SCAN FOR NEEDLE BIOPSY: CPT | Performed by: RADIOLOGY

## 2019-01-01 PROCEDURE — 96413 CHEMO IV INFUSION 1 HR: CPT

## 2019-01-01 PROCEDURE — 84132 ASSAY OF SERUM POTASSIUM: CPT | Performed by: FAMILY MEDICINE

## 2019-01-01 PROCEDURE — NC001 PR NO CHARGE: Performed by: NURSE PRACTITIONER

## 2019-01-01 PROCEDURE — 99152 MOD SED SAME PHYS/QHP 5/>YRS: CPT

## 2019-01-01 PROCEDURE — 99215 OFFICE O/P EST HI 40 MIN: CPT | Performed by: INTERNAL MEDICINE

## 2019-01-01 PROCEDURE — 95816 EEG AWAKE AND DROWSY: CPT

## 2019-01-01 PROCEDURE — 80048 BASIC METABOLIC PNL TOTAL CA: CPT | Performed by: FAMILY MEDICINE

## 2019-01-01 PROCEDURE — 76937 US GUIDE VASCULAR ACCESS: CPT

## 2019-01-01 PROCEDURE — 99292 CRITICAL CARE ADDL 30 MIN: CPT | Performed by: ANESTHESIOLOGY

## 2019-01-01 PROCEDURE — 80048 BASIC METABOLIC PNL TOTAL CA: CPT | Performed by: EMERGENCY MEDICINE

## 2019-01-01 PROCEDURE — 80076 HEPATIC FUNCTION PANEL: CPT | Performed by: EMERGENCY MEDICINE

## 2019-01-01 PROCEDURE — 96376 TX/PRO/DX INJ SAME DRUG ADON: CPT

## 2019-01-01 PROCEDURE — 36561 INSERT TUNNELED CV CATH: CPT | Performed by: RADIOLOGY

## 2019-01-01 PROCEDURE — 74177 CT ABD & PELVIS W/CONTRAST: CPT

## 2019-01-01 PROCEDURE — 80202 ASSAY OF VANCOMYCIN: CPT | Performed by: FAMILY MEDICINE

## 2019-01-01 PROCEDURE — 77001 FLUOROGUIDE FOR VEIN DEVICE: CPT

## 2019-01-01 PROCEDURE — 81001 URINALYSIS AUTO W/SCOPE: CPT | Performed by: EMERGENCY MEDICINE

## 2019-01-01 PROCEDURE — 85007 BL SMEAR W/DIFF WBC COUNT: CPT | Performed by: INTERNAL MEDICINE

## 2019-01-01 PROCEDURE — 99204 OFFICE O/P NEW MOD 45 MIN: CPT | Performed by: INTERNAL MEDICINE

## 2019-01-01 PROCEDURE — 99238 HOSP IP/OBS DSCHRG MGMT 30/<: CPT | Performed by: INTERNAL MEDICINE

## 2019-01-01 PROCEDURE — 77001 FLUOROGUIDE FOR VEIN DEVICE: CPT | Performed by: RADIOLOGY

## 2019-01-01 PROCEDURE — 88333 PATH CONSLTJ SURG CYTO XM 1: CPT | Performed by: PATHOLOGY

## 2019-01-01 PROCEDURE — 93010 ELECTROCARDIOGRAM REPORT: CPT | Performed by: INTERNAL MEDICINE

## 2019-01-01 PROCEDURE — 80048 BASIC METABOLIC PNL TOTAL CA: CPT | Performed by: PHYSICIAN ASSISTANT

## 2019-01-01 PROCEDURE — 36415 COLL VENOUS BLD VENIPUNCTURE: CPT | Performed by: INTERNAL MEDICINE

## 2019-01-01 PROCEDURE — 02HV33Z INSERTION OF INFUSION DEVICE INTO SUPERIOR VENA CAVA, PERCUTANEOUS APPROACH: ICD-10-PCS | Performed by: RADIOLOGY

## 2019-01-01 PROCEDURE — 99255 IP/OBS CONSLTJ NEW/EST HI 80: CPT | Performed by: INTERNAL MEDICINE

## 2019-01-01 PROCEDURE — 83690 ASSAY OF LIPASE: CPT | Performed by: EMERGENCY MEDICINE

## 2019-01-01 PROCEDURE — A9552 F18 FDG: HCPCS

## 2019-01-01 PROCEDURE — 95816 EEG AWAKE AND DROWSY: CPT | Performed by: PSYCHIATRY & NEUROLOGY

## 2019-01-01 PROCEDURE — 99285 EMERGENCY DEPT VISIT HI MDM: CPT | Performed by: EMERGENCY MEDICINE

## 2019-01-01 PROCEDURE — 99232 SBSQ HOSP IP/OBS MODERATE 35: CPT | Performed by: NURSE PRACTITIONER

## 2019-01-01 PROCEDURE — 99233 SBSQ HOSP IP/OBS HIGH 50: CPT | Performed by: NURSE PRACTITIONER

## 2019-01-01 PROCEDURE — 99223 1ST HOSP IP/OBS HIGH 75: CPT | Performed by: INTERNAL MEDICINE

## 2019-01-01 PROCEDURE — 99153 MOD SED SAME PHYS/QHP EA: CPT

## 2019-01-01 RX ORDER — POTASSIUM CHLORIDE 14.9 MG/ML
20 INJECTION INTRAVENOUS ONCE
Status: COMPLETED | OUTPATIENT
Start: 2019-01-01 | End: 2019-01-01

## 2019-01-01 RX ORDER — FLUOROURACIL 50 MG/ML
400 INJECTION, SOLUTION INTRAVENOUS ONCE
Status: CANCELLED | OUTPATIENT
Start: 2019-01-01

## 2019-01-01 RX ORDER — ONDANSETRON 2 MG/ML
4 INJECTION INTRAMUSCULAR; INTRAVENOUS ONCE
Status: COMPLETED | OUTPATIENT
Start: 2019-01-01 | End: 2019-01-01

## 2019-01-01 RX ORDER — OXYCODONE HYDROCHLORIDE 5 MG/1
5 TABLET ORAL ONCE
Status: COMPLETED | OUTPATIENT
Start: 2019-01-01 | End: 2019-01-01

## 2019-01-01 RX ORDER — IPRATROPIUM BROMIDE AND ALBUTEROL SULFATE 2.5; .5 MG/3ML; MG/3ML
3 SOLUTION RESPIRATORY (INHALATION) 4 TIMES DAILY PRN
Status: DISCONTINUED | OUTPATIENT
Start: 2019-01-01 | End: 2019-01-01

## 2019-01-01 RX ORDER — LEVALBUTEROL 1.25 MG/.5ML
1.25 SOLUTION, CONCENTRATE RESPIRATORY (INHALATION)
Status: DISCONTINUED | OUTPATIENT
Start: 2019-01-01 | End: 2019-01-01

## 2019-01-01 RX ORDER — DICYCLOMINE HCL 20 MG
20 TABLET ORAL ONCE
Status: COMPLETED | OUTPATIENT
Start: 2019-01-01 | End: 2019-01-01

## 2019-01-01 RX ORDER — METOCLOPRAMIDE HYDROCHLORIDE 5 MG/ML
10 INJECTION INTRAMUSCULAR; INTRAVENOUS EVERY 6 HOURS PRN
Status: DISCONTINUED | OUTPATIENT
Start: 2019-01-01 | End: 2019-01-01

## 2019-01-01 RX ORDER — PROCHLORPERAZINE MALEATE 10 MG
5 TABLET ORAL EVERY 6 HOURS PRN
Status: DISCONTINUED | OUTPATIENT
Start: 2019-01-01 | End: 2019-01-01

## 2019-01-01 RX ORDER — FENTANYL CITRATE 50 UG/ML
INJECTION, SOLUTION INTRAMUSCULAR; INTRAVENOUS CODE/TRAUMA/SEDATION MEDICATION
Status: COMPLETED | OUTPATIENT
Start: 2019-01-01 | End: 2019-01-01

## 2019-01-01 RX ORDER — POTASSIUM CHLORIDE 14.9 MG/ML
20 INJECTION INTRAVENOUS
Status: COMPLETED | OUTPATIENT
Start: 2019-01-01 | End: 2019-01-01

## 2019-01-01 RX ORDER — SACCHAROMYCES BOULARDII 250 MG
250 CAPSULE ORAL 2 TIMES DAILY
Status: DISCONTINUED | OUTPATIENT
Start: 2019-01-01 | End: 2019-01-01 | Stop reason: HOSPADM

## 2019-01-01 RX ORDER — DEXTROSE MONOHYDRATE 50 MG/ML
20 INJECTION, SOLUTION INTRAVENOUS ONCE
Status: COMPLETED | OUTPATIENT
Start: 2019-01-01 | End: 2019-01-01

## 2019-01-01 RX ORDER — NICOTINE 21 MG/24HR
1 PATCH, TRANSDERMAL 24 HOURS TRANSDERMAL DAILY
Qty: 28 PATCH | Refills: 0 | Status: SHIPPED | OUTPATIENT
Start: 2019-01-01 | End: 2019-01-01 | Stop reason: HOSPADM

## 2019-01-01 RX ORDER — FLUOROURACIL 50 MG/ML
400 INJECTION, SOLUTION INTRAVENOUS ONCE
Status: CANCELLED | OUTPATIENT
Start: 2019-08-06

## 2019-01-01 RX ORDER — METOCLOPRAMIDE HYDROCHLORIDE 5 MG/ML
10 INJECTION INTRAMUSCULAR; INTRAVENOUS ONCE
Status: COMPLETED | OUTPATIENT
Start: 2019-01-01 | End: 2019-01-01

## 2019-01-01 RX ORDER — SODIUM CHLORIDE 9 MG/ML
75 INJECTION, SOLUTION INTRAVENOUS CONTINUOUS
Status: DISCONTINUED | OUTPATIENT
Start: 2019-01-01 | End: 2019-01-01

## 2019-01-01 RX ORDER — MORPHINE SULFATE 4 MG/ML
4 INJECTION, SOLUTION INTRAMUSCULAR; INTRAVENOUS EVERY 6 HOURS PRN
Status: DISCONTINUED | OUTPATIENT
Start: 2019-01-01 | End: 2019-01-01

## 2019-01-01 RX ORDER — GABAPENTIN 300 MG/1
600 CAPSULE ORAL 2 TIMES DAILY
Status: DISCONTINUED | OUTPATIENT
Start: 2019-01-01 | End: 2019-01-01

## 2019-01-01 RX ORDER — BISACODYL 10 MG
10 SUPPOSITORY, RECTAL RECTAL ONCE
Status: DISCONTINUED | OUTPATIENT
Start: 2019-01-01 | End: 2019-01-01

## 2019-01-01 RX ORDER — MAGNESIUM SULFATE HEPTAHYDRATE 40 MG/ML
2 INJECTION, SOLUTION INTRAVENOUS DAILY PRN
Status: DISCONTINUED | OUTPATIENT
Start: 2019-01-01 | End: 2019-01-01

## 2019-01-01 RX ORDER — ONDANSETRON 4 MG/1
4 TABLET, ORALLY DISINTEGRATING ORAL EVERY 8 HOURS PRN
Status: DISCONTINUED | OUTPATIENT
Start: 2019-01-01 | End: 2019-01-01

## 2019-01-01 RX ORDER — PROMETHAZINE HYDROCHLORIDE 25 MG/ML
25 INJECTION, SOLUTION INTRAMUSCULAR; INTRAVENOUS EVERY 6 HOURS PRN
Status: DISCONTINUED | OUTPATIENT
Start: 2019-01-01 | End: 2019-01-01 | Stop reason: HOSPADM

## 2019-01-01 RX ORDER — LORAZEPAM 2 MG/ML
0.5 INJECTION INTRAMUSCULAR EVERY 2 HOUR PRN
Status: DISCONTINUED | OUTPATIENT
Start: 2019-01-01 | End: 2019-01-01 | Stop reason: HOSPADM

## 2019-01-01 RX ORDER — OXYCODONE HYDROCHLORIDE 10 MG/1
10 TABLET ORAL EVERY 4 HOURS PRN
Status: DISPENSED | OUTPATIENT
Start: 2019-01-01 | End: 2019-01-01

## 2019-01-01 RX ORDER — PROMETHAZINE HYDROCHLORIDE 25 MG/ML
25 INJECTION, SOLUTION INTRAMUSCULAR; INTRAVENOUS EVERY 6 HOURS PRN
Status: DISCONTINUED | OUTPATIENT
Start: 2019-01-01 | End: 2019-01-01

## 2019-01-01 RX ORDER — LIDOCAINE HYDROCHLORIDE 20 MG/ML
15 SOLUTION OROPHARYNGEAL ONCE
Status: COMPLETED | OUTPATIENT
Start: 2019-01-01 | End: 2019-01-01

## 2019-01-01 RX ORDER — SACCHAROMYCES BOULARDII 250 MG
250 CAPSULE ORAL 2 TIMES DAILY
Status: DISCONTINUED | OUTPATIENT
Start: 2019-01-01 | End: 2019-01-01

## 2019-01-01 RX ORDER — POTASSIUM CHLORIDE 29.8 MG/ML
40 INJECTION INTRAVENOUS ONCE
Status: COMPLETED | OUTPATIENT
Start: 2019-01-01 | End: 2019-01-01

## 2019-01-01 RX ORDER — MAGNESIUM SULFATE HEPTAHYDRATE 40 MG/ML
2 INJECTION, SOLUTION INTRAVENOUS ONCE
Status: COMPLETED | OUTPATIENT
Start: 2019-01-01 | End: 2019-01-01

## 2019-01-01 RX ORDER — PROPOFOL 10 MG/ML
INJECTION, EMULSION INTRAVENOUS AS NEEDED
Status: DISCONTINUED | OUTPATIENT
Start: 2019-01-01 | End: 2019-01-01 | Stop reason: SURG

## 2019-01-01 RX ORDER — HALOPERIDOL 5 MG/ML
2 INJECTION INTRAMUSCULAR EVERY 4 HOURS PRN
Status: DISCONTINUED | OUTPATIENT
Start: 2019-01-01 | End: 2019-01-01 | Stop reason: HOSPADM

## 2019-01-01 RX ORDER — DIPHENHYDRAMINE HYDROCHLORIDE 50 MG/ML
INJECTION INTRAMUSCULAR; INTRAVENOUS CODE/TRAUMA/SEDATION MEDICATION
Status: COMPLETED | OUTPATIENT
Start: 2019-01-01 | End: 2019-01-01

## 2019-01-01 RX ORDER — GUAIFENESIN 600 MG
600 TABLET, EXTENDED RELEASE 12 HR ORAL EVERY 12 HOURS SCHEDULED
Status: DISCONTINUED | OUTPATIENT
Start: 2019-01-01 | End: 2019-01-01

## 2019-01-01 RX ORDER — OXYCODONE HYDROCHLORIDE AND ACETAMINOPHEN 5; 325 MG/1; MG/1
1 TABLET ORAL EVERY 4 HOURS PRN
Qty: 30 TABLET | Refills: 0 | Status: SHIPPED | OUTPATIENT
Start: 2019-01-01 | End: 2019-01-01 | Stop reason: ALTCHOICE

## 2019-01-01 RX ORDER — DEXTROSE AND POTASSIUM CHLORIDE 5; .15 G/100ML; G/100ML
75 SOLUTION INTRAVENOUS CONTINUOUS
Status: DISCONTINUED | OUTPATIENT
Start: 2019-01-01 | End: 2019-01-01

## 2019-01-01 RX ORDER — DEXTROSE AND SODIUM CHLORIDE 5; .9 G/100ML; G/100ML
50 INJECTION, SOLUTION INTRAVENOUS CONTINUOUS
Status: DISCONTINUED | OUTPATIENT
Start: 2019-01-01 | End: 2019-01-01

## 2019-01-01 RX ORDER — SODIUM CHLORIDE 9 MG/ML
20 INJECTION, SOLUTION INTRAVENOUS ONCE AS NEEDED
Status: CANCELLED | OUTPATIENT
Start: 2019-08-06

## 2019-01-01 RX ORDER — LORAZEPAM 2 MG/ML
0.5 INJECTION INTRAMUSCULAR EVERY 6 HOURS
Status: DISCONTINUED | OUTPATIENT
Start: 2019-01-01 | End: 2019-01-01

## 2019-01-01 RX ORDER — MIDAZOLAM HYDROCHLORIDE 1 MG/ML
INJECTION INTRAMUSCULAR; INTRAVENOUS CODE/TRAUMA/SEDATION MEDICATION
Status: COMPLETED | OUTPATIENT
Start: 2019-01-01 | End: 2019-01-01

## 2019-01-01 RX ORDER — HYDROMORPHONE HCL/PF 1 MG/ML
1 SYRINGE (ML) INJECTION EVERY 4 HOURS
Status: DISCONTINUED | OUTPATIENT
Start: 2019-01-01 | End: 2019-01-01

## 2019-01-01 RX ORDER — NICOTINE 21 MG/24HR
1 PATCH, TRANSDERMAL 24 HOURS TRANSDERMAL DAILY
Status: DISCONTINUED | OUTPATIENT
Start: 2019-01-01 | End: 2019-01-01 | Stop reason: HOSPADM

## 2019-01-01 RX ORDER — LEVETIRACETAM 500 MG/1
500 TABLET ORAL EVERY 12 HOURS SCHEDULED
Status: DISCONTINUED | OUTPATIENT
Start: 2019-01-01 | End: 2019-01-01 | Stop reason: HOSPADM

## 2019-01-01 RX ORDER — DEXTROSE MONOHYDRATE 50 MG/ML
20 INJECTION, SOLUTION INTRAVENOUS ONCE
Status: CANCELLED | OUTPATIENT
Start: 2019-08-06

## 2019-01-01 RX ORDER — FOSAPREPITANT 150 MG/5ML
150 INJECTION, POWDER, LYOPHILIZED, FOR SOLUTION INTRAVENOUS ONCE
Status: DISCONTINUED | OUTPATIENT
Start: 2019-01-01 | End: 2019-01-01

## 2019-01-01 RX ORDER — PANTOPRAZOLE SODIUM 40 MG/1
40 TABLET, DELAYED RELEASE ORAL DAILY
Status: DISCONTINUED | OUTPATIENT
Start: 2019-01-01 | End: 2019-01-01 | Stop reason: HOSPADM

## 2019-01-01 RX ORDER — POTASSIUM CHLORIDE 29.8 MG/ML
40 INJECTION INTRAVENOUS ONCE
Status: DISCONTINUED | OUTPATIENT
Start: 2019-01-01 | End: 2019-01-01

## 2019-01-01 RX ORDER — HYDROMORPHONE HCL/PF 1 MG/ML
1 SYRINGE (ML) INJECTION
Status: DISCONTINUED | OUTPATIENT
Start: 2019-01-01 | End: 2019-01-01

## 2019-01-01 RX ORDER — MAGNESIUM HYDROXIDE/ALUMINUM HYDROXICE/SIMETHICONE 120; 1200; 1200 MG/30ML; MG/30ML; MG/30ML
30 SUSPENSION ORAL ONCE
Status: COMPLETED | OUTPATIENT
Start: 2019-01-01 | End: 2019-01-01

## 2019-01-01 RX ORDER — GLYCOPYRROLATE 0.2 MG/ML
0.1 INJECTION INTRAMUSCULAR; INTRAVENOUS
Status: DISCONTINUED | OUTPATIENT
Start: 2019-01-01 | End: 2019-01-01

## 2019-01-01 RX ORDER — ONDANSETRON 4 MG/1
4 TABLET, ORALLY DISINTEGRATING ORAL ONCE
Status: COMPLETED | OUTPATIENT
Start: 2019-01-01 | End: 2019-01-01

## 2019-01-01 RX ORDER — POTASSIUM CHLORIDE 20 MEQ/1
60 TABLET, EXTENDED RELEASE ORAL ONCE
Status: DISCONTINUED | OUTPATIENT
Start: 2019-01-01 | End: 2019-01-01

## 2019-01-01 RX ORDER — MORPHINE SULFATE 10 MG/ML
6 INJECTION, SOLUTION INTRAMUSCULAR; INTRAVENOUS ONCE
Status: COMPLETED | OUTPATIENT
Start: 2019-01-01 | End: 2019-01-01

## 2019-01-01 RX ORDER — PANTOPRAZOLE SODIUM 40 MG/1
40 TABLET, DELAYED RELEASE ORAL DAILY
Qty: 90 TABLET | Refills: 3 | Status: SHIPPED | OUTPATIENT
Start: 2019-01-01 | End: 2019-01-01 | Stop reason: HOSPADM

## 2019-01-01 RX ORDER — ONDANSETRON 2 MG/ML
INJECTION INTRAMUSCULAR; INTRAVENOUS
Status: DISPENSED
Start: 2019-01-01 | End: 2019-01-01

## 2019-01-01 RX ORDER — ALBUTEROL SULFATE 2.5 MG/3ML
2.5 SOLUTION RESPIRATORY (INHALATION) EVERY 6 HOURS PRN
Status: DISCONTINUED | OUTPATIENT
Start: 2019-01-01 | End: 2019-01-01

## 2019-01-01 RX ORDER — GLYCOPYRROLATE 0.2 MG/ML
0.2 INJECTION INTRAMUSCULAR; INTRAVENOUS EVERY 6 HOURS
Status: DISCONTINUED | OUTPATIENT
Start: 2019-01-01 | End: 2019-01-01 | Stop reason: HOSPADM

## 2019-01-01 RX ORDER — PROMETHAZINE HYDROCHLORIDE 25 MG/ML
12.5 INJECTION, SOLUTION INTRAMUSCULAR; INTRAVENOUS EVERY 6 HOURS PRN
Status: DISCONTINUED | OUTPATIENT
Start: 2019-01-01 | End: 2019-01-01

## 2019-01-01 RX ORDER — IPRATROPIUM BROMIDE AND ALBUTEROL SULFATE 2.5; .5 MG/3ML; MG/3ML
3 SOLUTION RESPIRATORY (INHALATION) ONCE
Status: COMPLETED | OUTPATIENT
Start: 2019-01-01 | End: 2019-01-01

## 2019-01-01 RX ORDER — SENNOSIDES 8.6 MG
2 TABLET ORAL
Status: DISCONTINUED | OUTPATIENT
Start: 2019-01-01 | End: 2019-01-01

## 2019-01-01 RX ORDER — LORAZEPAM 2 MG/ML
0.5 INJECTION INTRAMUSCULAR EVERY 4 HOURS PRN
Status: DISCONTINUED | OUTPATIENT
Start: 2019-01-01 | End: 2019-01-01

## 2019-01-01 RX ORDER — POTASSIUM CHLORIDE AND SODIUM CHLORIDE 900; 300 MG/100ML; MG/100ML
75 INJECTION, SOLUTION INTRAVENOUS CONTINUOUS
Status: DISCONTINUED | OUTPATIENT
Start: 2019-01-01 | End: 2019-01-01

## 2019-01-01 RX ORDER — GABAPENTIN 300 MG/1
600 CAPSULE ORAL 2 TIMES DAILY
Status: DISCONTINUED | OUTPATIENT
Start: 2019-01-01 | End: 2019-01-01 | Stop reason: HOSPADM

## 2019-01-01 RX ORDER — IPRATROPIUM BROMIDE AND ALBUTEROL SULFATE 2.5; .5 MG/3ML; MG/3ML
3 SOLUTION RESPIRATORY (INHALATION) EVERY 4 HOURS PRN
Status: DISCONTINUED | OUTPATIENT
Start: 2019-01-01 | End: 2019-01-01 | Stop reason: HOSPADM

## 2019-01-01 RX ORDER — OXYCODONE HYDROCHLORIDE AND ACETAMINOPHEN 5; 325 MG/1; MG/1
1 TABLET ORAL EVERY 4 HOURS PRN
Qty: 30 TABLET | Refills: 0 | Status: SHIPPED | OUTPATIENT
Start: 2019-01-01 | End: 2019-01-01 | Stop reason: HOSPADM

## 2019-01-01 RX ORDER — HYDROMORPHONE HCL/PF 1 MG/ML
1 SYRINGE (ML) INJECTION ONCE
Status: DISCONTINUED | OUTPATIENT
Start: 2019-01-01 | End: 2019-01-01

## 2019-01-01 RX ORDER — SODIUM CHLORIDE 9 MG/ML
20 INJECTION, SOLUTION INTRAVENOUS ONCE AS NEEDED
Status: CANCELLED | OUTPATIENT
Start: 2019-01-01

## 2019-01-01 RX ORDER — DEXTROSE MONOHYDRATE 50 MG/ML
20 INJECTION, SOLUTION INTRAVENOUS ONCE
Status: CANCELLED | OUTPATIENT
Start: 2019-01-01

## 2019-01-01 RX ORDER — MORPHINE SULFATE 4 MG/ML
4 INJECTION, SOLUTION INTRAMUSCULAR; INTRAVENOUS ONCE
Status: COMPLETED | OUTPATIENT
Start: 2019-01-01 | End: 2019-01-01

## 2019-01-01 RX ORDER — LEVETIRACETAM 100 MG/ML
500 SOLUTION ORAL 2 TIMES DAILY
Status: DISCONTINUED | OUTPATIENT
Start: 2019-01-01 | End: 2019-01-01

## 2019-01-01 RX ORDER — OXYCODONE HYDROCHLORIDE AND ACETAMINOPHEN 5; 325 MG/1; MG/1
1 TABLET ORAL EVERY 4 HOURS PRN
Status: DISCONTINUED | OUTPATIENT
Start: 2019-01-01 | End: 2019-01-01

## 2019-01-01 RX ORDER — CEFAZOLIN SODIUM 1 G/50ML
1000 SOLUTION INTRAVENOUS EVERY 8 HOURS
Status: DISCONTINUED | OUTPATIENT
Start: 2019-01-01 | End: 2019-01-01 | Stop reason: HOSPADM

## 2019-01-01 RX ORDER — SACCHAROMYCES BOULARDII 250 MG
250 CAPSULE ORAL 2 TIMES DAILY
Qty: 10 CAPSULE | Refills: 0 | Status: SHIPPED | OUTPATIENT
Start: 2019-01-01 | End: 2019-01-01 | Stop reason: HOSPADM

## 2019-01-01 RX ORDER — SENNOSIDES 8.6 MG
3 TABLET ORAL
Qty: 120 EACH | Refills: 0 | Status: SHIPPED | OUTPATIENT
Start: 2019-01-01 | End: 2019-01-01 | Stop reason: HOSPADM

## 2019-01-01 RX ORDER — ACETAMINOPHEN 325 MG/1
650 TABLET ORAL EVERY 6 HOURS PRN
Status: DISCONTINUED | OUTPATIENT
Start: 2019-01-01 | End: 2019-01-01

## 2019-01-01 RX ORDER — PANTOPRAZOLE SODIUM 40 MG/1
40 TABLET, DELAYED RELEASE ORAL DAILY
Status: DISCONTINUED | OUTPATIENT
Start: 2019-01-01 | End: 2019-01-01

## 2019-01-01 RX ORDER — OXYCODONE HYDROCHLORIDE AND ACETAMINOPHEN 5; 325 MG/1; MG/1
1 TABLET ORAL EVERY 4 HOURS PRN
Qty: 30 TABLET | Refills: 0 | Status: SHIPPED | OUTPATIENT
Start: 2019-01-01 | End: 2019-01-01 | Stop reason: SDUPTHER

## 2019-01-01 RX ORDER — METRONIDAZOLE 500 MG/1
500 TABLET ORAL EVERY 8 HOURS SCHEDULED
Status: DISCONTINUED | OUTPATIENT
Start: 2019-01-01 | End: 2019-01-01

## 2019-01-01 RX ORDER — ONDANSETRON 4 MG/1
4 TABLET, ORALLY DISINTEGRATING ORAL EVERY 6 HOURS PRN
Status: DISCONTINUED | OUTPATIENT
Start: 2019-01-01 | End: 2019-01-01 | Stop reason: HOSPADM

## 2019-01-01 RX ORDER — GABAPENTIN 300 MG/1
600 CAPSULE ORAL 2 TIMES DAILY
COMMUNITY
Start: 2019-01-01 | End: 2019-01-01 | Stop reason: HOSPADM

## 2019-01-01 RX ORDER — SODIUM CHLORIDE 9 MG/ML
20 INJECTION, SOLUTION INTRAVENOUS ONCE AS NEEDED
Status: CANCELLED | OUTPATIENT
Start: 2019-07-09

## 2019-01-01 RX ORDER — HYDROMORPHONE HCL/PF 1 MG/ML
0.5 SYRINGE (ML) INJECTION EVERY 4 HOURS PRN
Status: DISCONTINUED | OUTPATIENT
Start: 2019-01-01 | End: 2019-01-01

## 2019-01-01 RX ORDER — DEXTROSE, SODIUM CHLORIDE, AND POTASSIUM CHLORIDE 5; .45; .3 G/100ML; G/100ML; G/100ML
75 INJECTION INTRAVENOUS CONTINUOUS
Status: DISCONTINUED | OUTPATIENT
Start: 2019-01-01 | End: 2019-01-01

## 2019-01-01 RX ORDER — TRAMADOL HYDROCHLORIDE 50 MG/1
50 TABLET ORAL 3 TIMES DAILY PRN
Refills: 0 | COMMUNITY
Start: 2019-01-01 | End: 2019-01-01 | Stop reason: ALTCHOICE

## 2019-01-01 RX ORDER — FAMOTIDINE 20 MG/1
20 TABLET, FILM COATED ORAL DAILY
Status: DISCONTINUED | OUTPATIENT
Start: 2019-01-01 | End: 2019-01-01

## 2019-01-01 RX ORDER — MORPHINE SULFATE 4 MG/ML
4 INJECTION, SOLUTION INTRAMUSCULAR; INTRAVENOUS EVERY 2 HOUR PRN
Status: DISCONTINUED | OUTPATIENT
Start: 2019-01-01 | End: 2019-01-01

## 2019-01-01 RX ORDER — LIDOCAINE HYDROCHLORIDE 10 MG/ML
INJECTION, SOLUTION INFILTRATION; PERINEURAL CODE/TRAUMA/SEDATION MEDICATION
Status: COMPLETED | OUTPATIENT
Start: 2019-01-01 | End: 2019-01-01

## 2019-01-01 RX ORDER — OXYCODONE HYDROCHLORIDE 10 MG/1
10 TABLET ORAL EVERY 6 HOURS
Status: DISCONTINUED | OUTPATIENT
Start: 2019-01-01 | End: 2019-01-01

## 2019-01-01 RX ORDER — CEPHALEXIN 500 MG/1
500 CAPSULE ORAL 4 TIMES DAILY
Qty: 28 CAPSULE | Refills: 0 | Status: SHIPPED | OUTPATIENT
Start: 2019-01-01 | End: 2019-01-01 | Stop reason: SDUPTHER

## 2019-01-01 RX ORDER — ALBUMIN (HUMAN) 12.5 G/50ML
12.5 SOLUTION INTRAVENOUS ONCE
Status: COMPLETED | OUTPATIENT
Start: 2019-01-01 | End: 2019-01-01

## 2019-01-01 RX ORDER — OXYCODONE HYDROCHLORIDE AND ACETAMINOPHEN 5; 325 MG/1; MG/1
1 TABLET ORAL ONCE
Status: COMPLETED | OUTPATIENT
Start: 2019-01-01 | End: 2019-01-01

## 2019-01-01 RX ORDER — ONDANSETRON 2 MG/ML
4 INJECTION INTRAMUSCULAR; INTRAVENOUS EVERY 4 HOURS PRN
Status: DISCONTINUED | OUTPATIENT
Start: 2019-01-01 | End: 2019-01-01

## 2019-01-01 RX ORDER — METOCLOPRAMIDE HYDROCHLORIDE 5 MG/ML
10 INJECTION INTRAMUSCULAR; INTRAVENOUS EVERY 6 HOURS SCHEDULED
Status: DISCONTINUED | OUTPATIENT
Start: 2019-01-01 | End: 2019-01-01

## 2019-01-01 RX ORDER — OXYCODONE HYDROCHLORIDE 10 MG/1
10 TABLET ORAL EVERY 4 HOURS PRN
Status: DISCONTINUED | OUTPATIENT
Start: 2019-01-01 | End: 2019-01-01 | Stop reason: HOSPADM

## 2019-01-01 RX ORDER — MORPHINE SULFATE 30 MG/1
30 TABLET, FILM COATED, EXTENDED RELEASE ORAL EVERY 12 HOURS SCHEDULED
Qty: 30 TABLET | Refills: 0 | Status: SHIPPED | OUTPATIENT
Start: 2019-01-01 | End: 2019-01-01 | Stop reason: HOSPADM

## 2019-01-01 RX ORDER — SENNOSIDES 8.6 MG
3 TABLET ORAL
Status: DISCONTINUED | OUTPATIENT
Start: 2019-01-01 | End: 2019-01-01 | Stop reason: HOSPADM

## 2019-01-01 RX ORDER — DEXTROSE, SODIUM CHLORIDE, AND POTASSIUM CHLORIDE 5; .45; .15 G/100ML; G/100ML; G/100ML
75 INJECTION INTRAVENOUS CONTINUOUS
Status: DISCONTINUED | OUTPATIENT
Start: 2019-01-01 | End: 2019-01-01

## 2019-01-01 RX ORDER — POTASSIUM CHLORIDE 20MEQ/15ML
20 LIQUID (ML) ORAL
Status: DISCONTINUED | OUTPATIENT
Start: 2019-01-01 | End: 2019-01-01

## 2019-01-01 RX ORDER — FLUOROURACIL 50 MG/ML
400 INJECTION, SOLUTION INTRAVENOUS ONCE
Status: CANCELLED | OUTPATIENT
Start: 2019-07-23

## 2019-01-01 RX ORDER — MORPHINE SULFATE 30 MG/1
30 TABLET, FILM COATED, EXTENDED RELEASE ORAL EVERY 12 HOURS SCHEDULED
Status: DISCONTINUED | OUTPATIENT
Start: 2019-01-01 | End: 2019-01-01 | Stop reason: HOSPADM

## 2019-01-01 RX ORDER — POTASSIUM CHLORIDE 20 MEQ/1
60 TABLET, EXTENDED RELEASE ORAL ONCE
Status: COMPLETED | OUTPATIENT
Start: 2019-01-01 | End: 2019-01-01

## 2019-01-01 RX ORDER — MORPHINE SULFATE 10 MG/ML
6 INJECTION, SOLUTION INTRAMUSCULAR; INTRAVENOUS EVERY 2 HOUR PRN
Status: DISCONTINUED | OUTPATIENT
Start: 2019-01-01 | End: 2019-01-01

## 2019-01-01 RX ORDER — IPRATROPIUM BROMIDE AND ALBUTEROL SULFATE 2.5; .5 MG/3ML; MG/3ML
3 SOLUTION RESPIRATORY (INHALATION) EVERY 6 HOURS PRN
Status: DISCONTINUED | OUTPATIENT
Start: 2019-01-01 | End: 2019-01-01

## 2019-01-01 RX ORDER — FUROSEMIDE 10 MG/ML
20 INJECTION INTRAMUSCULAR; INTRAVENOUS ONCE
Status: COMPLETED | OUTPATIENT
Start: 2019-01-01 | End: 2019-01-01

## 2019-01-01 RX ORDER — PANTOPRAZOLE SODIUM 40 MG/1
40 INJECTION, POWDER, FOR SOLUTION INTRAVENOUS ONCE
Status: COMPLETED | OUTPATIENT
Start: 2019-01-01 | End: 2019-01-01

## 2019-01-01 RX ORDER — FLUOROURACIL 50 MG/ML
400 INJECTION, SOLUTION INTRAVENOUS ONCE
Status: COMPLETED | OUTPATIENT
Start: 2019-01-01 | End: 2019-01-01

## 2019-01-01 RX ORDER — PANTOPRAZOLE SODIUM 40 MG/1
40 INJECTION, POWDER, FOR SOLUTION INTRAVENOUS EVERY 12 HOURS SCHEDULED
Status: DISCONTINUED | OUTPATIENT
Start: 2019-01-01 | End: 2019-01-01

## 2019-01-01 RX ORDER — LORAZEPAM 2 MG/ML
1 INJECTION INTRAMUSCULAR ONCE
Status: COMPLETED | OUTPATIENT
Start: 2019-01-01 | End: 2019-01-01

## 2019-01-01 RX ORDER — POLYETHYLENE GLYCOL 3350 17 G/17G
17 POWDER, FOR SOLUTION ORAL DAILY
Status: DISCONTINUED | OUTPATIENT
Start: 2019-01-01 | End: 2019-01-01 | Stop reason: HOSPADM

## 2019-01-01 RX ORDER — SCOLOPAMINE TRANSDERMAL SYSTEM 1 MG/1
1 PATCH, EXTENDED RELEASE TRANSDERMAL
Status: DISCONTINUED | OUTPATIENT
Start: 2019-01-01 | End: 2019-01-01

## 2019-01-01 RX ORDER — SODIUM CHLORIDE, SODIUM LACTATE, POTASSIUM CHLORIDE, CALCIUM CHLORIDE 600; 310; 30; 20 MG/100ML; MG/100ML; MG/100ML; MG/100ML
125 INJECTION, SOLUTION INTRAVENOUS CONTINUOUS
Status: DISCONTINUED | OUTPATIENT
Start: 2019-01-01 | End: 2019-01-01 | Stop reason: HOSPADM

## 2019-01-01 RX ORDER — METOCLOPRAMIDE HYDROCHLORIDE 5 MG/ML
10 INJECTION INTRAMUSCULAR; INTRAVENOUS
Status: DISCONTINUED | OUTPATIENT
Start: 2019-01-01 | End: 2019-01-01

## 2019-01-01 RX ORDER — PANTOPRAZOLE SODIUM 40 MG/1
40 INJECTION, POWDER, FOR SOLUTION INTRAVENOUS
Status: DISCONTINUED | OUTPATIENT
Start: 2019-01-01 | End: 2019-01-01

## 2019-01-01 RX ORDER — CEPHALEXIN 500 MG/1
500 CAPSULE ORAL 4 TIMES DAILY
Qty: 28 CAPSULE | Refills: 0 | Status: SHIPPED | OUTPATIENT
Start: 2019-01-01 | End: 2019-01-01 | Stop reason: HOSPADM

## 2019-01-01 RX ORDER — MORPHINE SULFATE 10 MG/ML
5 INJECTION, SOLUTION INTRAMUSCULAR; INTRAVENOUS EVERY 4 HOURS
Status: DISCONTINUED | OUTPATIENT
Start: 2019-01-01 | End: 2019-01-01

## 2019-01-01 RX ORDER — OLANZAPINE 5 MG/1
5 TABLET, ORALLY DISINTEGRATING ORAL
Status: DISCONTINUED | OUTPATIENT
Start: 2019-01-01 | End: 2019-01-01

## 2019-01-01 RX ORDER — FLUOROURACIL 50 MG/ML
400 INJECTION, SOLUTION INTRAVENOUS ONCE
Status: CANCELLED | OUTPATIENT
Start: 2019-07-09

## 2019-01-01 RX ORDER — MORPHINE SULFATE 4 MG/ML
4 INJECTION, SOLUTION INTRAMUSCULAR; INTRAVENOUS EVERY 4 HOURS
Status: DISCONTINUED | OUTPATIENT
Start: 2019-01-01 | End: 2019-01-01

## 2019-01-01 RX ORDER — SENNOSIDES 8.6 MG
3 TABLET ORAL
Status: DISCONTINUED | OUTPATIENT
Start: 2019-01-01 | End: 2019-01-01

## 2019-01-01 RX ORDER — ACETAMINOPHEN 325 MG/1
975 TABLET ORAL EVERY 8 HOURS SCHEDULED
Status: DISCONTINUED | OUTPATIENT
Start: 2019-01-01 | End: 2019-01-01 | Stop reason: HOSPADM

## 2019-01-01 RX ORDER — HYDROMORPHONE HCL/PF 1 MG/ML
0.5 SYRINGE (ML) INJECTION EVERY 2 HOUR PRN
Status: DISCONTINUED | OUTPATIENT
Start: 2019-01-01 | End: 2019-01-01

## 2019-01-01 RX ORDER — LEVETIRACETAM 100 MG/ML
5 SOLUTION ORAL 2 TIMES DAILY
COMMUNITY
End: 2019-01-01 | Stop reason: SDUPTHER

## 2019-01-01 RX ORDER — ONDANSETRON 4 MG/1
4 TABLET, FILM COATED ORAL EVERY 8 HOURS PRN
Qty: 30 TABLET | Refills: 3 | Status: SHIPPED | OUTPATIENT
Start: 2019-01-01 | End: 2019-01-01 | Stop reason: HOSPADM

## 2019-01-01 RX ORDER — LIDOCAINE HYDROCHLORIDE AND EPINEPHRINE 10; 10 MG/ML; UG/ML
INJECTION, SOLUTION INFILTRATION; PERINEURAL CODE/TRAUMA/SEDATION MEDICATION
Status: COMPLETED | OUTPATIENT
Start: 2019-01-01 | End: 2019-01-01

## 2019-01-01 RX ORDER — SODIUM CHLORIDE 9 MG/ML
50 INJECTION, SOLUTION INTRAVENOUS CONTINUOUS
Status: DISCONTINUED | OUTPATIENT
Start: 2019-01-01 | End: 2019-01-01 | Stop reason: HOSPADM

## 2019-01-01 RX ORDER — LEVETIRACETAM 100 MG/ML
5 SOLUTION ORAL 2 TIMES DAILY
Qty: 900 ML | Refills: 3 | Status: SHIPPED | OUTPATIENT
Start: 2019-01-01 | End: 2019-01-01 | Stop reason: HOSPADM

## 2019-01-01 RX ORDER — HALOPERIDOL 5 MG/ML
2 INJECTION INTRAMUSCULAR ONCE
Status: COMPLETED | OUTPATIENT
Start: 2019-01-01 | End: 2019-01-01

## 2019-01-01 RX ORDER — DEXTROSE MONOHYDRATE 50 MG/ML
20 INJECTION, SOLUTION INTRAVENOUS ONCE
Status: CANCELLED | OUTPATIENT
Start: 2019-07-23

## 2019-01-01 RX ORDER — SODIUM CHLORIDE, SODIUM GLUCONATE, SODIUM ACETATE, POTASSIUM CHLORIDE, MAGNESIUM CHLORIDE, SODIUM PHOSPHATE, DIBASIC, AND POTASSIUM PHOSPHATE .53; .5; .37; .037; .03; .012; .00082 G/100ML; G/100ML; G/100ML; G/100ML; G/100ML; G/100ML; G/100ML
75 INJECTION, SOLUTION INTRAVENOUS CONTINUOUS
Status: DISCONTINUED | OUTPATIENT
Start: 2019-01-01 | End: 2019-01-01

## 2019-01-01 RX ORDER — SODIUM CHLORIDE 9 MG/ML
20 INJECTION, SOLUTION INTRAVENOUS ONCE AS NEEDED
Status: DISCONTINUED | OUTPATIENT
Start: 2019-01-01 | End: 2019-01-01 | Stop reason: HOSPADM

## 2019-01-01 RX ORDER — MORPHINE SULFATE 30 MG/1
30 TABLET, FILM COATED, EXTENDED RELEASE ORAL EVERY 12 HOURS SCHEDULED
Status: DISCONTINUED | OUTPATIENT
Start: 2019-01-01 | End: 2019-01-01

## 2019-01-01 RX ORDER — ONDANSETRON 2 MG/ML
INJECTION INTRAMUSCULAR; INTRAVENOUS
Status: COMPLETED
Start: 2019-01-01 | End: 2019-01-01

## 2019-01-01 RX ORDER — LORAZEPAM 2 MG/ML
0.5 INJECTION INTRAMUSCULAR EVERY 6 HOURS PRN
Status: DISCONTINUED | OUTPATIENT
Start: 2019-01-01 | End: 2019-01-01

## 2019-01-01 RX ORDER — OXYCODONE HYDROCHLORIDE 5 MG/1
5 TABLET ORAL EVERY 4 HOURS PRN
Status: DISCONTINUED | OUTPATIENT
Start: 2019-01-01 | End: 2019-01-01

## 2019-01-01 RX ORDER — OXYCODONE HYDROCHLORIDE 10 MG/1
10 TABLET ORAL EVERY 4 HOURS PRN
Qty: 30 TABLET | Refills: 0 | Status: SHIPPED | OUTPATIENT
Start: 2019-01-01 | End: 2019-01-01 | Stop reason: HOSPADM

## 2019-01-01 RX ORDER — IPRATROPIUM BROMIDE AND ALBUTEROL SULFATE 2.5; .5 MG/3ML; MG/3ML
3 SOLUTION RESPIRATORY (INHALATION)
Status: DISCONTINUED | OUTPATIENT
Start: 2019-01-01 | End: 2019-01-01

## 2019-01-01 RX ORDER — HYDROMORPHONE HCL/PF 1 MG/ML
0.5 SYRINGE (ML) INJECTION AS NEEDED
Status: DISCONTINUED | OUTPATIENT
Start: 2019-01-01 | End: 2019-01-01

## 2019-01-01 RX ORDER — DEXTROSE MONOHYDRATE 50 MG/ML
20 INJECTION, SOLUTION INTRAVENOUS ONCE
Status: CANCELLED | OUTPATIENT
Start: 2019-07-09

## 2019-01-01 RX ORDER — HYDROMORPHONE HCL/PF 1 MG/ML
1 SYRINGE (ML) INJECTION EVERY 2 HOUR PRN
Status: DISCONTINUED | OUTPATIENT
Start: 2019-01-01 | End: 2019-01-01

## 2019-01-01 RX ORDER — MORPHINE SULFATE 4 MG/ML
4 INJECTION, SOLUTION INTRAMUSCULAR; INTRAVENOUS EVERY 4 HOURS PRN
Status: DISCONTINUED | OUTPATIENT
Start: 2019-01-01 | End: 2019-01-01

## 2019-01-01 RX ORDER — RANITIDINE 150 MG/1
150 TABLET ORAL
COMMUNITY
Start: 2019-01-01 | End: 2019-01-01 | Stop reason: HOSPADM

## 2019-01-01 RX ORDER — FAMOTIDINE 20 MG/1
20 TABLET, FILM COATED ORAL
Status: DISCONTINUED | OUTPATIENT
Start: 2019-01-01 | End: 2019-01-01 | Stop reason: HOSPADM

## 2019-01-01 RX ORDER — DEXAMETHASONE SODIUM PHOSPHATE 4 MG/ML
4 INJECTION, SOLUTION INTRA-ARTICULAR; INTRALESIONAL; INTRAMUSCULAR; INTRAVENOUS; SOFT TISSUE
Status: DISCONTINUED | OUTPATIENT
Start: 2019-01-01 | End: 2019-01-01 | Stop reason: HOSPADM

## 2019-01-01 RX ORDER — SODIUM CHLORIDE 9 MG/ML
20 INJECTION, SOLUTION INTRAVENOUS ONCE AS NEEDED
Status: CANCELLED | OUTPATIENT
Start: 2019-07-23

## 2019-01-01 RX ORDER — GABAPENTIN 300 MG/1
600 CAPSULE ORAL ONCE
Status: COMPLETED | OUTPATIENT
Start: 2019-01-01 | End: 2019-01-01

## 2019-01-01 RX ADMIN — GABAPENTIN 600 MG: 300 CAPSULE ORAL at 17:06

## 2019-01-01 RX ADMIN — PANTOPRAZOLE SODIUM 40 MG: 40 INJECTION, POWDER, FOR SOLUTION INTRAVENOUS at 08:44

## 2019-01-01 RX ADMIN — SODIUM CHLORIDE 75 ML/HR: 0.9 INJECTION, SOLUTION INTRAVENOUS at 03:20

## 2019-01-01 RX ADMIN — HYDROMORPHONE HYDROCHLORIDE 1 MG: 1 INJECTION, SOLUTION INTRAMUSCULAR; INTRAVENOUS; SUBCUTANEOUS at 06:27

## 2019-01-01 RX ADMIN — Medication 10 ML: at 04:57

## 2019-01-01 RX ADMIN — LEVETIRACETAM 500 MG: 100 INJECTION, SOLUTION INTRAVENOUS at 11:08

## 2019-01-01 RX ADMIN — LEVETIRACETAM 500 MG: 100 INJECTION, SOLUTION INTRAVENOUS at 08:44

## 2019-01-01 RX ADMIN — PANTOPRAZOLE SODIUM 40 MG: 40 INJECTION, POWDER, FOR SOLUTION INTRAVENOUS at 08:54

## 2019-01-01 RX ADMIN — ACETAMINOPHEN 975 MG: 325 TABLET, FILM COATED ORAL at 05:01

## 2019-01-01 RX ADMIN — MORPHINE SULFATE 4 MG: 4 INJECTION INTRAVENOUS at 23:47

## 2019-01-01 RX ADMIN — MORPHINE SULFATE 6 MG: 10 INJECTION INTRAVENOUS at 14:07

## 2019-01-01 RX ADMIN — MORPHINE SULFATE 30 MG: 30 TABLET, FILM COATED, EXTENDED RELEASE ORAL at 09:26

## 2019-01-01 RX ADMIN — POLYETHYLENE GLYCOL 3350 17 G: 17 POWDER, FOR SOLUTION ORAL at 09:27

## 2019-01-01 RX ADMIN — SODIUM CHLORIDE 150 MG: 0.9 INJECTION, SOLUTION INTRAVENOUS at 13:19

## 2019-01-01 RX ADMIN — LEVALBUTEROL HYDROCHLORIDE 1.25 MG: 1.25 SOLUTION, CONCENTRATE RESPIRATORY (INHALATION) at 13:16

## 2019-01-01 RX ADMIN — METOCLOPRAMIDE HYDROCHLORIDE 10 MG: 5 INJECTION INTRAMUSCULAR; INTRAVENOUS at 23:19

## 2019-01-01 RX ADMIN — LEVETIRACETAM 500 MG: 100 INJECTION, SOLUTION INTRAVENOUS at 21:05

## 2019-01-01 RX ADMIN — IPRATROPIUM BROMIDE 0.5 MG: 0.5 SOLUTION RESPIRATORY (INHALATION) at 19:21

## 2019-01-01 RX ADMIN — LORAZEPAM 0.5 MG: 2 INJECTION INTRAMUSCULAR; INTRAVENOUS at 03:39

## 2019-01-01 RX ADMIN — DIPHENHYDRAMINE HYDROCHLORIDE 25 MG: 50 INJECTION, SOLUTION INTRAMUSCULAR; INTRAVENOUS at 14:13

## 2019-01-01 RX ADMIN — SCOPALAMINE 1 PATCH: 1 PATCH, EXTENDED RELEASE TRANSDERMAL at 11:58

## 2019-01-01 RX ADMIN — VANCOMYCIN HYDROCHLORIDE 750 MG: 750 INJECTION, SOLUTION INTRAVENOUS at 18:01

## 2019-01-01 RX ADMIN — PROMETHAZINE HYDROCHLORIDE 25 MG: 25 INJECTION INTRAMUSCULAR; INTRAVENOUS at 04:51

## 2019-01-01 RX ADMIN — HYDROMORPHONE HYDROCHLORIDE 1 MG: 1 INJECTION, SOLUTION INTRAMUSCULAR; INTRAVENOUS; SUBCUTANEOUS at 02:46

## 2019-01-01 RX ADMIN — OXYCODONE HYDROCHLORIDE 10 MG: 10 TABLET ORAL at 11:55

## 2019-01-01 RX ADMIN — PANTOPRAZOLE SODIUM 40 MG: 40 INJECTION, POWDER, FOR SOLUTION INTRAVENOUS at 20:15

## 2019-01-01 RX ADMIN — Medication 1000 MG: at 15:21

## 2019-01-01 RX ADMIN — METRONIDAZOLE 500 MG: 500 INJECTION, SOLUTION INTRAVENOUS at 18:19

## 2019-01-01 RX ADMIN — BISACODYL 10 MG: 5 TABLET, COATED ORAL at 09:25

## 2019-01-01 RX ADMIN — PANTOPRAZOLE SODIUM 40 MG: 40 TABLET, DELAYED RELEASE ORAL at 09:26

## 2019-01-01 RX ADMIN — LEVALBUTEROL HYDROCHLORIDE 1.25 MG: 1.25 SOLUTION, CONCENTRATE RESPIRATORY (INHALATION) at 13:08

## 2019-01-01 RX ADMIN — CEFEPIME 2000 MG: 2 INJECTION, POWDER, FOR SOLUTION INTRAVENOUS at 11:30

## 2019-01-01 RX ADMIN — MORPHINE SULFATE 4 MG: 4 INJECTION INTRAVENOUS at 19:03

## 2019-01-01 RX ADMIN — DEXAMETHASONE SODIUM PHOSPHATE 4 MG: 4 INJECTION, SOLUTION INTRAMUSCULAR; INTRAVENOUS at 08:03

## 2019-01-01 RX ADMIN — LORAZEPAM 0.5 MG: 2 INJECTION INTRAMUSCULAR; INTRAVENOUS at 21:24

## 2019-01-01 RX ADMIN — METOCLOPRAMIDE HYDROCHLORIDE 10 MG: 5 INJECTION INTRAMUSCULAR; INTRAVENOUS at 17:59

## 2019-01-01 RX ADMIN — GABAPENTIN 600 MG: 300 CAPSULE ORAL at 17:53

## 2019-01-01 RX ADMIN — DEXAMETHASONE SODIUM PHOSPHATE 4 MG: 4 INJECTION, SOLUTION INTRAMUSCULAR; INTRAVENOUS at 13:00

## 2019-01-01 RX ADMIN — MORPHINE SULFATE 2 MG: 2 INJECTION, SOLUTION INTRAMUSCULAR; INTRAVENOUS at 18:22

## 2019-01-01 RX ADMIN — SODIUM CHLORIDE 100 ML/HR: 0.9 INJECTION, SOLUTION INTRAVENOUS at 03:49

## 2019-01-01 RX ADMIN — LEVALBUTEROL HYDROCHLORIDE 1.25 MG: 1.25 SOLUTION, CONCENTRATE RESPIRATORY (INHALATION) at 07:15

## 2019-01-01 RX ADMIN — ACETAMINOPHEN 975 MG: 325 TABLET, FILM COATED ORAL at 12:46

## 2019-01-01 RX ADMIN — Medication 10 ML: at 13:13

## 2019-01-01 RX ADMIN — METRONIDAZOLE 500 MG: 500 INJECTION, SOLUTION INTRAVENOUS at 02:20

## 2019-01-01 RX ADMIN — ONDANSETRON 8 MG: 2 INJECTION INTRAMUSCULAR; INTRAVENOUS at 11:16

## 2019-01-01 RX ADMIN — LIDOCAINE HYDROCHLORIDE 5 ML: 10 INJECTION, SOLUTION INFILTRATION; PERINEURAL at 09:52

## 2019-01-01 RX ADMIN — METRONIDAZOLE 500 MG: 500 INJECTION, SOLUTION INTRAVENOUS at 13:13

## 2019-01-01 RX ADMIN — GABAPENTIN 600 MG: 300 CAPSULE ORAL at 18:29

## 2019-01-01 RX ADMIN — LORAZEPAM 0.5 MG: 2 INJECTION INTRAMUSCULAR; INTRAVENOUS at 09:24

## 2019-01-01 RX ADMIN — HYDROMORPHONE HYDROCHLORIDE 1 MG: 1 INJECTION, SOLUTION INTRAMUSCULAR; INTRAVENOUS; SUBCUTANEOUS at 21:26

## 2019-01-01 RX ADMIN — GUAIFENESIN 600 MG: 600 TABLET, EXTENDED RELEASE ORAL at 09:30

## 2019-01-01 RX ADMIN — HALOPERIDOL LACTATE 2 MG: 5 INJECTION INTRAMUSCULAR at 11:28

## 2019-01-01 RX ADMIN — SODIUM CHLORIDE 100 ML/HR: 0.9 INJECTION, SOLUTION INTRAVENOUS at 09:50

## 2019-01-01 RX ADMIN — Medication 250 MG: at 17:53

## 2019-01-01 RX ADMIN — OLANZAPINE 5 MG: 5 TABLET, ORALLY DISINTEGRATING ORAL at 23:00

## 2019-01-01 RX ADMIN — LEVETIRACETAM 500 MG: 100 INJECTION, SOLUTION INTRAVENOUS at 21:07

## 2019-01-01 RX ADMIN — DEXAMETHASONE SODIUM PHOSPHATE 4 MG: 4 INJECTION, SOLUTION INTRAMUSCULAR; INTRAVENOUS at 08:50

## 2019-01-01 RX ADMIN — GABAPENTIN 600 MG: 300 CAPSULE ORAL at 17:22

## 2019-01-01 RX ADMIN — IPRATROPIUM BROMIDE 0.5 MG: 0.5 SOLUTION RESPIRATORY (INHALATION) at 13:21

## 2019-01-01 RX ADMIN — OXYCODONE HYDROCHLORIDE 10 MG: 10 TABLET ORAL at 18:01

## 2019-01-01 RX ADMIN — MORPHINE SULFATE 2 MG: 2 INJECTION, SOLUTION INTRAMUSCULAR; INTRAVENOUS at 11:29

## 2019-01-01 RX ADMIN — DEXAMETHASONE SODIUM PHOSPHATE 4 MG: 4 INJECTION, SOLUTION INTRAMUSCULAR; INTRAVENOUS at 08:02

## 2019-01-01 RX ADMIN — OXYCODONE HYDROCHLORIDE 10 MG: 10 TABLET ORAL at 17:54

## 2019-01-01 RX ADMIN — ENOXAPARIN SODIUM 40 MG: 40 INJECTION SUBCUTANEOUS at 09:22

## 2019-01-01 RX ADMIN — METOCLOPRAMIDE HYDROCHLORIDE 10 MG: 5 INJECTION INTRAMUSCULAR; INTRAVENOUS at 13:54

## 2019-01-01 RX ADMIN — HYDROMORPHONE HYDROCHLORIDE 1 MG: 1 INJECTION, SOLUTION INTRAMUSCULAR; INTRAVENOUS; SUBCUTANEOUS at 13:54

## 2019-01-01 RX ADMIN — MORPHINE SULFATE 5 MG: 10 INJECTION INTRAVENOUS at 15:19

## 2019-01-01 RX ADMIN — LEVETIRACETAM 500 MG: 100 INJECTION, SOLUTION INTRAVENOUS at 20:25

## 2019-01-01 RX ADMIN — HYDROMORPHONE HYDROCHLORIDE 1 MG: 1 INJECTION, SOLUTION INTRAMUSCULAR; INTRAVENOUS; SUBCUTANEOUS at 13:22

## 2019-01-01 RX ADMIN — POTASSIUM CHLORIDE 20 MEQ: 20 SOLUTION ORAL at 22:49

## 2019-01-01 RX ADMIN — Medication 10 ML: at 05:53

## 2019-01-01 RX ADMIN — IOHEXOL 100 ML: 350 INJECTION, SOLUTION INTRAVENOUS at 21:16

## 2019-01-01 RX ADMIN — ONDANSETRON 4 MG: 4 TABLET, ORALLY DISINTEGRATING ORAL at 08:03

## 2019-01-01 RX ADMIN — ONDANSETRON 8 MG: 2 INJECTION INTRAMUSCULAR; INTRAVENOUS at 11:43

## 2019-01-01 RX ADMIN — ONDANSETRON 4 MG: 4 TABLET, ORALLY DISINTEGRATING ORAL at 23:31

## 2019-01-01 RX ADMIN — IPRATROPIUM BROMIDE 0.5 MG: 0.5 SOLUTION RESPIRATORY (INHALATION) at 19:20

## 2019-01-01 RX ADMIN — GABAPENTIN 600 MG: 300 CAPSULE ORAL at 09:11

## 2019-01-01 RX ADMIN — SODIUM CHLORIDE 1000 ML: 0.9 INJECTION, SOLUTION INTRAVENOUS at 00:10

## 2019-01-01 RX ADMIN — HYDROMORPHONE HYDROCHLORIDE 1 MG: 1 INJECTION, SOLUTION INTRAMUSCULAR; INTRAVENOUS; SUBCUTANEOUS at 16:08

## 2019-01-01 RX ADMIN — HYDROMORPHONE HYDROCHLORIDE 1 MG: 1 INJECTION, SOLUTION INTRAMUSCULAR; INTRAVENOUS; SUBCUTANEOUS at 08:04

## 2019-01-01 RX ADMIN — MAGNESIUM SULFATE HEPTAHYDRATE 2 G: 40 INJECTION, SOLUTION INTRAVENOUS at 13:09

## 2019-01-01 RX ADMIN — DEXAMETHASONE SODIUM PHOSPHATE 4 MG: 4 INJECTION, SOLUTION INTRAMUSCULAR; INTRAVENOUS at 15:22

## 2019-01-01 RX ADMIN — MORPHINE SULFATE 2 MG: 2 INJECTION, SOLUTION INTRAMUSCULAR; INTRAVENOUS at 11:38

## 2019-01-01 RX ADMIN — GABAPENTIN 600 MG: 300 CAPSULE ORAL at 18:26

## 2019-01-01 RX ADMIN — LEVALBUTEROL HYDROCHLORIDE 1.25 MG: 1.25 SOLUTION, CONCENTRATE RESPIRATORY (INHALATION) at 08:00

## 2019-01-01 RX ADMIN — HYDROMORPHONE HYDROCHLORIDE 1 MG: 1 INJECTION, SOLUTION INTRAMUSCULAR; INTRAVENOUS; SUBCUTANEOUS at 21:45

## 2019-01-01 RX ADMIN — SENNOSIDES 25.8 MG: 8.6 TABLET, FILM COATED ORAL at 21:04

## 2019-01-01 RX ADMIN — LORAZEPAM 0.5 MG: 2 INJECTION INTRAMUSCULAR; INTRAVENOUS at 17:22

## 2019-01-01 RX ADMIN — PROPOFOL 30 MG: 10 INJECTION, EMULSION INTRAVENOUS at 10:49

## 2019-01-01 RX ADMIN — OXALIPLATIN 117.3 MG: 5 INJECTION, SOLUTION, CONCENTRATE INTRAVENOUS at 09:53

## 2019-01-01 RX ADMIN — OLANZAPINE 5 MG: 5 TABLET, ORALLY DISINTEGRATING ORAL at 21:31

## 2019-01-01 RX ADMIN — METRONIDAZOLE 500 MG: 500 INJECTION, SOLUTION INTRAVENOUS at 20:30

## 2019-01-01 RX ADMIN — LEVALBUTEROL HYDROCHLORIDE 1.25 MG: 1.25 SOLUTION, CONCENTRATE RESPIRATORY (INHALATION) at 07:54

## 2019-01-01 RX ADMIN — METOCLOPRAMIDE 10 MG: 5 INJECTION, SOLUTION INTRAMUSCULAR; INTRAVENOUS at 22:59

## 2019-01-01 RX ADMIN — FAMOTIDINE 20 MG: 20 TABLET ORAL at 22:06

## 2019-01-01 RX ADMIN — ONDANSETRON 8 MG: 2 INJECTION INTRAMUSCULAR; INTRAVENOUS at 15:32

## 2019-01-01 RX ADMIN — MORPHINE SULFATE 4 MG: 4 INJECTION INTRAVENOUS at 03:08

## 2019-01-01 RX ADMIN — METRONIDAZOLE 500 MG: 500 INJECTION, SOLUTION INTRAVENOUS at 02:47

## 2019-01-01 RX ADMIN — PROMETHAZINE HYDROCHLORIDE 25 MG: 25 INJECTION INTRAMUSCULAR; INTRAVENOUS at 18:22

## 2019-01-01 RX ADMIN — ENOXAPARIN SODIUM 40 MG: 40 INJECTION SUBCUTANEOUS at 08:04

## 2019-01-01 RX ADMIN — METRONIDAZOLE 500 MG: 500 INJECTION, SOLUTION INTRAVENOUS at 03:15

## 2019-01-01 RX ADMIN — HYDROMORPHONE HYDROCHLORIDE 0.5 MG: 1 INJECTION, SOLUTION INTRAMUSCULAR; INTRAVENOUS; SUBCUTANEOUS at 03:23

## 2019-01-01 RX ADMIN — ONDANSETRON 8 MG: 2 INJECTION INTRAMUSCULAR; INTRAVENOUS at 02:28

## 2019-01-01 RX ADMIN — POTASSIUM CHLORIDE AND SODIUM CHLORIDE 75 ML/HR: 900; 300 INJECTION, SOLUTION INTRAVENOUS at 11:44

## 2019-01-01 RX ADMIN — METOCLOPRAMIDE HYDROCHLORIDE 10 MG: 5 INJECTION INTRAMUSCULAR; INTRAVENOUS at 13:23

## 2019-01-01 RX ADMIN — LEUCOVORIN CALCIUM 552 MG: 350 INJECTION, POWDER, LYOPHILIZED, FOR SOLUTION INTRAMUSCULAR; INTRAVENOUS at 09:53

## 2019-01-01 RX ADMIN — PROMETHAZINE HYDROCHLORIDE 25 MG: 25 INJECTION INTRAMUSCULAR; INTRAVENOUS at 04:56

## 2019-01-01 RX ADMIN — LEVETIRACETAM 500 MG: 500 TABLET ORAL at 08:19

## 2019-01-01 RX ADMIN — MORPHINE SULFATE 30 MG: 30 TABLET, FILM COATED, EXTENDED RELEASE ORAL at 09:33

## 2019-01-01 RX ADMIN — ONDANSETRON 8 MG: 2 INJECTION INTRAMUSCULAR; INTRAVENOUS at 20:05

## 2019-01-01 RX ADMIN — HYDROMORPHONE HYDROCHLORIDE 0.5 MG: 1 INJECTION, SOLUTION INTRAMUSCULAR; INTRAVENOUS; SUBCUTANEOUS at 17:58

## 2019-01-01 RX ADMIN — FUROSEMIDE 20 MG: 10 INJECTION, SOLUTION INTRAMUSCULAR; INTRAVENOUS at 21:36

## 2019-01-01 RX ADMIN — Medication 10 ML: at 20:28

## 2019-01-01 RX ADMIN — ENOXAPARIN SODIUM 40 MG: 40 INJECTION SUBCUTANEOUS at 09:28

## 2019-01-01 RX ADMIN — DEXAMETHASONE SODIUM PHOSPHATE: 10 INJECTION, SOLUTION INTRAMUSCULAR; INTRAVENOUS at 09:07

## 2019-01-01 RX ADMIN — LEVALBUTEROL HYDROCHLORIDE 1.25 MG: 1.25 SOLUTION, CONCENTRATE RESPIRATORY (INHALATION) at 20:14

## 2019-01-01 RX ADMIN — LEVALBUTEROL HYDROCHLORIDE 1.25 MG: 1.25 SOLUTION, CONCENTRATE RESPIRATORY (INHALATION) at 07:19

## 2019-01-01 RX ADMIN — PANTOPRAZOLE SODIUM 40 MG: 40 TABLET, DELAYED RELEASE ORAL at 09:03

## 2019-01-01 RX ADMIN — POTASSIUM CHLORIDE 20 MEQ: 200 INJECTION, SOLUTION INTRAVENOUS at 08:44

## 2019-01-01 RX ADMIN — LEVETIRACETAM 500 MG: 500 TABLET ORAL at 09:23

## 2019-01-01 RX ADMIN — POTASSIUM CHLORIDE 20 MEQ: 200 INJECTION, SOLUTION INTRAVENOUS at 18:09

## 2019-01-01 RX ADMIN — LEVETIRACETAM 500 MG: 100 INJECTION, SOLUTION INTRAVENOUS at 08:03

## 2019-01-01 RX ADMIN — Medication 10 ML: at 09:13

## 2019-01-01 RX ADMIN — LEVETIRACETAM 500 MG: 100 INJECTION, SOLUTION INTRAVENOUS at 09:16

## 2019-01-01 RX ADMIN — GABAPENTIN 600 MG: 300 CAPSULE ORAL at 22:29

## 2019-01-01 RX ADMIN — METRONIDAZOLE 500 MG: 500 INJECTION, SOLUTION INTRAVENOUS at 05:33

## 2019-01-01 RX ADMIN — LORAZEPAM 0.5 MG: 2 INJECTION INTRAMUSCULAR; INTRAVENOUS at 21:33

## 2019-01-01 RX ADMIN — LEVALBUTEROL HYDROCHLORIDE 1.25 MG: 1.25 SOLUTION, CONCENTRATE RESPIRATORY (INHALATION) at 19:20

## 2019-01-01 RX ADMIN — PANTOPRAZOLE SODIUM 40 MG: 40 TABLET, DELAYED RELEASE ORAL at 09:22

## 2019-01-01 RX ADMIN — POTASSIUM CHLORIDE 20 MEQ: 20 SOLUTION ORAL at 18:46

## 2019-01-01 RX ADMIN — GLYCOPYRROLATE 0.1 MG: 0.2 INJECTION, SOLUTION INTRAMUSCULAR; INTRAVENOUS at 02:24

## 2019-01-01 RX ADMIN — ONDANSETRON 8 MG: 2 INJECTION INTRAMUSCULAR; INTRAVENOUS at 08:05

## 2019-01-01 RX ADMIN — MIDAZOLAM HYDROCHLORIDE 1 MG: 1 INJECTION, SOLUTION INTRAMUSCULAR; INTRAVENOUS at 14:13

## 2019-01-01 RX ADMIN — LEVETIRACETAM 500 MG: 100 INJECTION, SOLUTION INTRAVENOUS at 20:41

## 2019-01-01 RX ADMIN — HYDROMORPHONE HYDROCHLORIDE 1 MG: 1 INJECTION, SOLUTION INTRAMUSCULAR; INTRAVENOUS; SUBCUTANEOUS at 15:22

## 2019-01-01 RX ADMIN — GUAIFENESIN 600 MG: 600 TABLET, EXTENDED RELEASE ORAL at 20:09

## 2019-01-01 RX ADMIN — HYDROMORPHONE HYDROCHLORIDE 0.5 MG: 1 INJECTION, SOLUTION INTRAMUSCULAR; INTRAVENOUS; SUBCUTANEOUS at 19:44

## 2019-01-01 RX ADMIN — LEVALBUTEROL HYDROCHLORIDE 1.25 MG: 1.25 SOLUTION, CONCENTRATE RESPIRATORY (INHALATION) at 19:19

## 2019-01-01 RX ADMIN — MORPHINE SULFATE 30 MG: 30 TABLET, FILM COATED, EXTENDED RELEASE ORAL at 09:11

## 2019-01-01 RX ADMIN — POTASSIUM CHLORIDE 20 MEQ: 20 SOLUTION ORAL at 14:51

## 2019-01-01 RX ADMIN — PANTOPRAZOLE SODIUM 40 MG: 40 INJECTION, POWDER, FOR SOLUTION INTRAVENOUS at 11:24

## 2019-01-01 RX ADMIN — ONDANSETRON 4 MG: 4 TABLET, ORALLY DISINTEGRATING ORAL at 21:16

## 2019-01-01 RX ADMIN — GABAPENTIN 600 MG: 300 CAPSULE ORAL at 09:03

## 2019-01-01 RX ADMIN — IPRATROPIUM BROMIDE 0.5 MG: 0.5 SOLUTION RESPIRATORY (INHALATION) at 13:42

## 2019-01-01 RX ADMIN — MORPHINE SULFATE 2 MG: 2 INJECTION, SOLUTION INTRAMUSCULAR; INTRAVENOUS at 23:52

## 2019-01-01 RX ADMIN — POTASSIUM CHLORIDE 20 MEQ: 200 INJECTION, SOLUTION INTRAVENOUS at 21:45

## 2019-01-01 RX ADMIN — PANTOPRAZOLE SODIUM 40 MG: 40 INJECTION, POWDER, FOR SOLUTION INTRAVENOUS at 22:24

## 2019-01-01 RX ADMIN — LEVALBUTEROL HYDROCHLORIDE 1.25 MG: 1.25 SOLUTION, CONCENTRATE RESPIRATORY (INHALATION) at 07:33

## 2019-01-01 RX ADMIN — MAGNESIUM SULFATE HEPTAHYDRATE 2 G: 40 INJECTION, SOLUTION INTRAVENOUS at 08:12

## 2019-01-01 RX ADMIN — LEVALBUTEROL HYDROCHLORIDE 1.25 MG: 1.25 SOLUTION, CONCENTRATE RESPIRATORY (INHALATION) at 19:21

## 2019-01-01 RX ADMIN — LEVALBUTEROL HYDROCHLORIDE 1.25 MG: 1.25 SOLUTION, CONCENTRATE RESPIRATORY (INHALATION) at 13:19

## 2019-01-01 RX ADMIN — METRONIDAZOLE 500 MG: 500 INJECTION, SOLUTION INTRAVENOUS at 11:52

## 2019-01-01 RX ADMIN — PANTOPRAZOLE SODIUM 40 MG: 40 INJECTION, POWDER, FOR SOLUTION INTRAVENOUS at 21:01

## 2019-01-01 RX ADMIN — SENNOSIDES 17.2 MG: 8.6 TABLET, FILM COATED ORAL at 21:25

## 2019-01-01 RX ADMIN — ONDANSETRON 8 MG: 2 INJECTION INTRAMUSCULAR; INTRAVENOUS at 18:14

## 2019-01-01 RX ADMIN — GABAPENTIN 600 MG: 300 CAPSULE ORAL at 17:54

## 2019-01-01 RX ADMIN — MORPHINE SULFATE 4 MG: 4 INJECTION INTRAVENOUS at 15:42

## 2019-01-01 RX ADMIN — GABAPENTIN 600 MG: 300 CAPSULE ORAL at 09:25

## 2019-01-01 RX ADMIN — ALBUMIN (HUMAN) 12.5 G: 0.25 INJECTION, SOLUTION INTRAVENOUS at 12:02

## 2019-01-01 RX ADMIN — Medication 250 MG: at 17:22

## 2019-01-01 RX ADMIN — HYDROMORPHONE HYDROCHLORIDE 0.5 MG: 1 INJECTION, SOLUTION INTRAMUSCULAR; INTRAVENOUS; SUBCUTANEOUS at 09:14

## 2019-01-01 RX ADMIN — ACETAMINOPHEN 975 MG: 325 TABLET, FILM COATED ORAL at 16:18

## 2019-01-01 RX ADMIN — FAMOTIDINE 20 MG: 20 TABLET ORAL at 22:29

## 2019-01-01 RX ADMIN — MORPHINE SULFATE 30 MG: 30 TABLET, FILM COATED, EXTENDED RELEASE ORAL at 22:50

## 2019-01-01 RX ADMIN — LEVETIRACETAM 500 MG: 100 INJECTION, SOLUTION INTRAVENOUS at 00:35

## 2019-01-01 RX ADMIN — PROMETHAZINE HYDROCHLORIDE 25 MG: 25 INJECTION INTRAMUSCULAR; INTRAVENOUS at 03:10

## 2019-01-01 RX ADMIN — ALUMINUM HYDROXIDE, MAGNESIUM HYDROXIDE, AND SIMETHICONE 30 ML: 200; 200; 20 SUSPENSION ORAL at 15:39

## 2019-01-01 RX ADMIN — ONDANSETRON 8 MG: 2 INJECTION INTRAMUSCULAR; INTRAVENOUS at 18:09

## 2019-01-01 RX ADMIN — ACETAMINOPHEN 975 MG: 325 TABLET, FILM COATED ORAL at 21:25

## 2019-01-01 RX ADMIN — MORPHINE SULFATE 30 MG: 30 TABLET, FILM COATED, EXTENDED RELEASE ORAL at 08:19

## 2019-01-01 RX ADMIN — HYDROMORPHONE HYDROCHLORIDE 1 MG: 1 INJECTION, SOLUTION INTRAMUSCULAR; INTRAVENOUS; SUBCUTANEOUS at 03:15

## 2019-01-01 RX ADMIN — ENOXAPARIN SODIUM 40 MG: 40 INJECTION SUBCUTANEOUS at 13:10

## 2019-01-01 RX ADMIN — IOHEXOL 85 ML: 350 INJECTION, SOLUTION INTRAVENOUS at 14:17

## 2019-01-01 RX ADMIN — METOCLOPRAMIDE 10 MG: 5 INJECTION, SOLUTION INTRAMUSCULAR; INTRAVENOUS at 03:25

## 2019-01-01 RX ADMIN — METRONIDAZOLE 500 MG: 500 INJECTION, SOLUTION INTRAVENOUS at 09:54

## 2019-01-01 RX ADMIN — ONDANSETRON 8 MG: 2 INJECTION INTRAMUSCULAR; INTRAVENOUS at 02:58

## 2019-01-01 RX ADMIN — POTASSIUM CHLORIDE 20 MEQ: 20 SOLUTION ORAL at 17:53

## 2019-01-01 RX ADMIN — LEVALBUTEROL HYDROCHLORIDE 1.25 MG: 1.25 SOLUTION, CONCENTRATE RESPIRATORY (INHALATION) at 13:10

## 2019-01-01 RX ADMIN — MORPHINE SULFATE 2 MG: 2 INJECTION, SOLUTION INTRAMUSCULAR; INTRAVENOUS at 06:24

## 2019-01-01 RX ADMIN — LEVALBUTEROL HYDROCHLORIDE 1.25 MG: 1.25 SOLUTION, CONCENTRATE RESPIRATORY (INHALATION) at 13:25

## 2019-01-01 RX ADMIN — LEVETIRACETAM 500 MG: 100 INJECTION, SOLUTION INTRAVENOUS at 21:43

## 2019-01-01 RX ADMIN — LEVETIRACETAM 500 MG: 100 SOLUTION ORAL at 17:07

## 2019-01-01 RX ADMIN — DEXAMETHASONE SODIUM PHOSPHATE 4 MG: 4 INJECTION, SOLUTION INTRAMUSCULAR; INTRAVENOUS at 13:22

## 2019-01-01 RX ADMIN — METRONIDAZOLE 500 MG: 500 INJECTION, SOLUTION INTRAVENOUS at 21:05

## 2019-01-01 RX ADMIN — GADOBUTROL 4 ML: 604.72 INJECTION INTRAVENOUS at 23:50

## 2019-01-01 RX ADMIN — DIPHENHYDRAMINE HYDROCHLORIDE 25 MG: 50 INJECTION, SOLUTION INTRAMUSCULAR; INTRAVENOUS at 09:51

## 2019-01-01 RX ADMIN — DEXAMETHASONE SODIUM PHOSPHATE 4 MG: 4 INJECTION, SOLUTION INTRAMUSCULAR; INTRAVENOUS at 08:58

## 2019-01-01 RX ADMIN — ENOXAPARIN SODIUM 40 MG: 40 INJECTION SUBCUTANEOUS at 09:34

## 2019-01-01 RX ADMIN — LORAZEPAM 1 MG: 2 INJECTION INTRAMUSCULAR; INTRAVENOUS at 00:13

## 2019-01-01 RX ADMIN — DEXTROSE, SODIUM CHLORIDE, AND POTASSIUM CHLORIDE 75 ML/HR: 5; .45; .3 INJECTION INTRAVENOUS at 18:23

## 2019-01-01 RX ADMIN — MORPHINE SULFATE 2 MG: 2 INJECTION, SOLUTION INTRAMUSCULAR; INTRAVENOUS at 23:15

## 2019-01-01 RX ADMIN — CEFEPIME 2000 MG: 2 INJECTION, POWDER, FOR SOLUTION INTRAVENOUS at 23:12

## 2019-01-01 RX ADMIN — MAGNESIUM SULFATE HEPTAHYDRATE 2 G: 40 INJECTION, SOLUTION INTRAVENOUS at 13:23

## 2019-01-01 RX ADMIN — FAMOTIDINE 20 MG: 20 TABLET ORAL at 21:04

## 2019-01-01 RX ADMIN — IPRATROPIUM BROMIDE 0.5 MG: 0.5 SOLUTION RESPIRATORY (INHALATION) at 20:51

## 2019-01-01 RX ADMIN — POTASSIUM CHLORIDE AND SODIUM CHLORIDE 75 ML/HR: 900; 300 INJECTION, SOLUTION INTRAVENOUS at 19:47

## 2019-01-01 RX ADMIN — MORPHINE SULFATE 2 MG: 2 INJECTION, SOLUTION INTRAMUSCULAR; INTRAVENOUS at 21:16

## 2019-01-01 RX ADMIN — ONDANSETRON 4 MG: 2 INJECTION INTRAMUSCULAR; INTRAVENOUS at 11:08

## 2019-01-01 RX ADMIN — LEVALBUTEROL HYDROCHLORIDE 1.25 MG: 1.25 SOLUTION, CONCENTRATE RESPIRATORY (INHALATION) at 14:10

## 2019-01-01 RX ADMIN — GUAIFENESIN 600 MG: 600 TABLET, EXTENDED RELEASE ORAL at 20:17

## 2019-01-01 RX ADMIN — METOCLOPRAMIDE 10 MG: 5 INJECTION, SOLUTION INTRAMUSCULAR; INTRAVENOUS at 06:26

## 2019-01-01 RX ADMIN — SENNOSIDES 25.8 MG: 8.6 TABLET, FILM COATED ORAL at 21:26

## 2019-01-01 RX ADMIN — MORPHINE SULFATE 4 MG: 4 INJECTION INTRAVENOUS at 11:24

## 2019-01-01 RX ADMIN — METOCLOPRAMIDE HYDROCHLORIDE 10 MG: 5 INJECTION INTRAMUSCULAR; INTRAVENOUS at 05:10

## 2019-01-01 RX ADMIN — MORPHINE SULFATE 2 MG: 2 INJECTION, SOLUTION INTRAMUSCULAR; INTRAVENOUS at 09:23

## 2019-01-01 RX ADMIN — MIDAZOLAM HYDROCHLORIDE 1 MG: 1 INJECTION, SOLUTION INTRAMUSCULAR; INTRAVENOUS at 09:51

## 2019-01-01 RX ADMIN — Medication 10 ML: at 05:26

## 2019-01-01 RX ADMIN — ONDANSETRON 4 MG: 2 INJECTION INTRAMUSCULAR; INTRAVENOUS at 19:49

## 2019-01-01 RX ADMIN — DEXAMETHASONE SODIUM PHOSPHATE 4 MG: 4 INJECTION, SOLUTION INTRAMUSCULAR; INTRAVENOUS at 15:41

## 2019-01-01 RX ADMIN — PROMETHAZINE HYDROCHLORIDE 25 MG: 25 INJECTION INTRAMUSCULAR; INTRAVENOUS at 06:27

## 2019-01-01 RX ADMIN — ONDANSETRON 4 MG: 2 INJECTION INTRAMUSCULAR; INTRAVENOUS at 05:39

## 2019-01-01 RX ADMIN — SODIUM CHLORIDE 20 ML/HR: 0.9 INJECTION, SOLUTION INTRAVENOUS at 08:30

## 2019-01-01 RX ADMIN — CEFAZOLIN SODIUM 1000 MG: 1 SOLUTION INTRAVENOUS at 19:50

## 2019-01-01 RX ADMIN — IPRATROPIUM BROMIDE 0.5 MG: 0.5 SOLUTION RESPIRATORY (INHALATION) at 19:19

## 2019-01-01 RX ADMIN — HYDROMORPHONE HYDROCHLORIDE 1 MG: 1 INJECTION, SOLUTION INTRAMUSCULAR; INTRAVENOUS; SUBCUTANEOUS at 17:36

## 2019-01-01 RX ADMIN — Medication 10 ML: at 03:08

## 2019-01-01 RX ADMIN — POTASSIUM CHLORIDE 20 MEQ: 20 SOLUTION ORAL at 17:22

## 2019-01-01 RX ADMIN — HYDROMORPHONE HYDROCHLORIDE 1 MG: 1 INJECTION, SOLUTION INTRAMUSCULAR; INTRAVENOUS; SUBCUTANEOUS at 17:24

## 2019-01-01 RX ADMIN — GLYCOPYRROLATE 0.1 MG: 0.2 INJECTION, SOLUTION INTRAMUSCULAR; INTRAVENOUS at 08:50

## 2019-01-01 RX ADMIN — ONDANSETRON 4 MG: 2 INJECTION INTRAMUSCULAR; INTRAVENOUS at 17:34

## 2019-01-01 RX ADMIN — ONDANSETRON 8 MG: 2 INJECTION INTRAMUSCULAR; INTRAVENOUS at 23:15

## 2019-01-01 RX ADMIN — ONDANSETRON 4 MG: 2 INJECTION INTRAMUSCULAR; INTRAVENOUS at 16:12

## 2019-01-01 RX ADMIN — LEVALBUTEROL HYDROCHLORIDE 1.25 MG: 1.25 SOLUTION, CONCENTRATE RESPIRATORY (INHALATION) at 13:42

## 2019-01-01 RX ADMIN — Medication 250 MG: at 13:09

## 2019-01-01 RX ADMIN — IPRATROPIUM BROMIDE 0.5 MG: 0.5 SOLUTION RESPIRATORY (INHALATION) at 13:16

## 2019-01-01 RX ADMIN — LORAZEPAM 0.5 MG: 2 INJECTION INTRAMUSCULAR; INTRAVENOUS at 16:24

## 2019-01-01 RX ADMIN — METOCLOPRAMIDE HYDROCHLORIDE 10 MG: 5 INJECTION INTRAMUSCULAR; INTRAVENOUS at 02:15

## 2019-01-01 RX ADMIN — VANCOMYCIN HYDROCHLORIDE 750 MG: 750 INJECTION, SOLUTION INTRAVENOUS at 16:04

## 2019-01-01 RX ADMIN — DEXTROSE, SODIUM CHLORIDE, AND POTASSIUM CHLORIDE 75 ML/HR: 5; .45; .3 INJECTION INTRAVENOUS at 13:00

## 2019-01-01 RX ADMIN — LORAZEPAM 0.5 MG: 2 INJECTION INTRAMUSCULAR; INTRAVENOUS at 03:40

## 2019-01-01 RX ADMIN — POTASSIUM CHLORIDE 20 MEQ: 200 INJECTION, SOLUTION INTRAVENOUS at 11:23

## 2019-01-01 RX ADMIN — FAMOTIDINE 20 MG: 20 TABLET ORAL at 09:26

## 2019-01-01 RX ADMIN — FAMOTIDINE 20 MG: 20 TABLET ORAL at 22:50

## 2019-01-01 RX ADMIN — LEVETIRACETAM 500 MG: 100 INJECTION, SOLUTION INTRAVENOUS at 13:13

## 2019-01-01 RX ADMIN — OXYCODONE HYDROCHLORIDE 10 MG: 10 TABLET ORAL at 17:06

## 2019-01-01 RX ADMIN — HYDROMORPHONE HYDROCHLORIDE 1 MG: 1 INJECTION, SOLUTION INTRAMUSCULAR; INTRAVENOUS; SUBCUTANEOUS at 21:03

## 2019-01-01 RX ADMIN — OXYCODONE HYDROCHLORIDE 10 MG: 10 TABLET ORAL at 13:09

## 2019-01-01 RX ADMIN — METRONIDAZOLE 500 MG: 500 INJECTION, SOLUTION INTRAVENOUS at 17:23

## 2019-01-01 RX ADMIN — Medication 250 MG: at 09:23

## 2019-01-01 RX ADMIN — DEXAMETHASONE SODIUM PHOSPHATE 4 MG: 4 INJECTION, SOLUTION INTRAMUSCULAR; INTRAVENOUS at 08:54

## 2019-01-01 RX ADMIN — LORAZEPAM 0.5 MG: 2 INJECTION INTRAMUSCULAR; INTRAVENOUS at 08:03

## 2019-01-01 RX ADMIN — METOCLOPRAMIDE HYDROCHLORIDE 10 MG: 5 INJECTION INTRAMUSCULAR; INTRAVENOUS at 08:44

## 2019-01-01 RX ADMIN — METRONIDAZOLE 500 MG: 500 INJECTION, SOLUTION INTRAVENOUS at 09:22

## 2019-01-01 RX ADMIN — POTASSIUM CHLORIDE 20 MEQ: 200 INJECTION, SOLUTION INTRAVENOUS at 12:57

## 2019-01-01 RX ADMIN — FLUOROURACIL 550 MG: 50 INJECTION, SOLUTION INTRAVENOUS at 12:06

## 2019-01-01 RX ADMIN — OXYCODONE HYDROCHLORIDE 10 MG: 10 TABLET ORAL at 16:19

## 2019-01-01 RX ADMIN — HYDROMORPHONE HYDROCHLORIDE 1 MG: 1 INJECTION, SOLUTION INTRAMUSCULAR; INTRAVENOUS; SUBCUTANEOUS at 00:17

## 2019-01-01 RX ADMIN — POTASSIUM CHLORIDE 20 MEQ: 200 INJECTION, SOLUTION INTRAVENOUS at 14:59

## 2019-01-01 RX ADMIN — HALOPERIDOL LACTATE 2 MG: 5 INJECTION INTRAMUSCULAR at 11:58

## 2019-01-01 RX ADMIN — GABAPENTIN 600 MG: 300 CAPSULE ORAL at 18:00

## 2019-01-01 RX ADMIN — LEVETIRACETAM 500 MG: 100 SOLUTION ORAL at 11:18

## 2019-01-01 RX ADMIN — LEVETIRACETAM 500 MG: 500 TABLET ORAL at 22:06

## 2019-01-01 RX ADMIN — OXYCODONE HYDROCHLORIDE 10 MG: 10 TABLET ORAL at 07:18

## 2019-01-01 RX ADMIN — MORPHINE SULFATE 2 MG: 2 INJECTION, SOLUTION INTRAMUSCULAR; INTRAVENOUS at 15:41

## 2019-01-01 RX ADMIN — METOCLOPRAMIDE 10 MG: 5 INJECTION, SOLUTION INTRAMUSCULAR; INTRAVENOUS at 20:17

## 2019-01-01 RX ADMIN — ONDANSETRON 4 MG: 2 INJECTION INTRAMUSCULAR; INTRAVENOUS at 20:57

## 2019-01-01 RX ADMIN — IPRATROPIUM BROMIDE 0.5 MG: 0.5 SOLUTION RESPIRATORY (INHALATION) at 08:00

## 2019-01-01 RX ADMIN — METRONIDAZOLE 500 MG: 500 INJECTION, SOLUTION INTRAVENOUS at 13:00

## 2019-01-01 RX ADMIN — LORAZEPAM 0.5 MG: 2 INJECTION INTRAMUSCULAR; INTRAVENOUS at 17:53

## 2019-01-01 RX ADMIN — MORPHINE SULFATE 30 MG: 30 TABLET, FILM COATED, EXTENDED RELEASE ORAL at 21:05

## 2019-01-01 RX ADMIN — LORAZEPAM 0.5 MG: 2 INJECTION INTRAMUSCULAR; INTRAVENOUS at 09:06

## 2019-01-01 RX ADMIN — VANCOMYCIN HYDROCHLORIDE 750 MG: 750 INJECTION, SOLUTION INTRAVENOUS at 00:53

## 2019-01-01 RX ADMIN — PANTOPRAZOLE SODIUM 40 MG: 40 INJECTION, POWDER, FOR SOLUTION INTRAVENOUS at 20:32

## 2019-01-01 RX ADMIN — OXYCODONE HYDROCHLORIDE 5 MG: 5 TABLET ORAL at 06:03

## 2019-01-01 RX ADMIN — POTASSIUM CHLORIDE AND SODIUM CHLORIDE 75 ML/HR: 900; 300 INJECTION, SOLUTION INTRAVENOUS at 08:44

## 2019-01-01 RX ADMIN — GLYCOPYRROLATE 0.2 MG: 0.2 INJECTION, SOLUTION INTRAMUSCULAR; INTRAVENOUS at 11:24

## 2019-01-01 RX ADMIN — Medication 10 ML: at 08:02

## 2019-01-01 RX ADMIN — PROMETHAZINE HYDROCHLORIDE 25 MG: 25 INJECTION INTRAMUSCULAR; INTRAVENOUS at 03:20

## 2019-01-01 RX ADMIN — MORPHINE SULFATE 4 MG: 4 INJECTION INTRAVENOUS at 11:42

## 2019-01-01 RX ADMIN — ONDANSETRON 8 MG: 2 INJECTION INTRAMUSCULAR; INTRAVENOUS at 21:06

## 2019-01-01 RX ADMIN — MIDAZOLAM HYDROCHLORIDE 1 MG: 1 INJECTION, SOLUTION INTRAMUSCULAR; INTRAVENOUS at 09:44

## 2019-01-01 RX ADMIN — METRONIDAZOLE 500 MG: 500 INJECTION, SOLUTION INTRAVENOUS at 18:30

## 2019-01-01 RX ADMIN — GABAPENTIN 600 MG: 300 CAPSULE ORAL at 09:10

## 2019-01-01 RX ADMIN — LEVETIRACETAM 500 MG: 500 TABLET ORAL at 22:12

## 2019-01-01 RX ADMIN — HYDROMORPHONE HYDROCHLORIDE 1 MG: 1 INJECTION, SOLUTION INTRAMUSCULAR; INTRAVENOUS; SUBCUTANEOUS at 09:06

## 2019-01-01 RX ADMIN — HYDROMORPHONE HYDROCHLORIDE 1 MG: 1 INJECTION, SOLUTION INTRAMUSCULAR; INTRAVENOUS; SUBCUTANEOUS at 02:12

## 2019-01-01 RX ADMIN — LEVETIRACETAM 500 MG: 100 INJECTION, SOLUTION INTRAVENOUS at 21:30

## 2019-01-01 RX ADMIN — SODIUM CHLORIDE, SODIUM LACTATE, POTASSIUM CHLORIDE, AND CALCIUM CHLORIDE: .6; .31; .03; .02 INJECTION, SOLUTION INTRAVENOUS at 10:32

## 2019-01-01 RX ADMIN — SENNOSIDES 25.8 MG: 8.6 TABLET, FILM COATED ORAL at 22:06

## 2019-01-01 RX ADMIN — ENOXAPARIN SODIUM 40 MG: 40 INJECTION SUBCUTANEOUS at 09:11

## 2019-01-01 RX ADMIN — POTASSIUM CHLORIDE 40 MEQ: 400 INJECTION, SOLUTION INTRAVENOUS at 07:00

## 2019-01-01 RX ADMIN — POTASSIUM CHLORIDE 60 MEQ: 1500 TABLET, EXTENDED RELEASE ORAL at 09:33

## 2019-01-01 RX ADMIN — OXYCODONE HYDROCHLORIDE 10 MG: 10 TABLET ORAL at 05:02

## 2019-01-01 RX ADMIN — FAMOTIDINE 20 MG: 20 TABLET ORAL at 21:25

## 2019-01-01 RX ADMIN — FENTANYL CITRATE 50 MCG: 50 INJECTION, SOLUTION INTRAMUSCULAR; INTRAVENOUS at 09:44

## 2019-01-01 RX ADMIN — ACETAMINOPHEN 975 MG: 325 TABLET, FILM COATED ORAL at 05:49

## 2019-01-01 RX ADMIN — CEFEPIME 2000 MG: 2 INJECTION, POWDER, FOR SOLUTION INTRAVENOUS at 14:54

## 2019-01-01 RX ADMIN — METOCLOPRAMIDE HYDROCHLORIDE 10 MG: 5 INJECTION INTRAMUSCULAR; INTRAVENOUS at 17:37

## 2019-01-01 RX ADMIN — ONDANSETRON 8 MG: 2 INJECTION INTRAMUSCULAR; INTRAVENOUS at 18:46

## 2019-01-01 RX ADMIN — OXYCODONE HYDROCHLORIDE 10 MG: 10 TABLET ORAL at 16:18

## 2019-01-01 RX ADMIN — IPRATROPIUM BROMIDE 0.5 MG: 0.5 SOLUTION RESPIRATORY (INHALATION) at 07:19

## 2019-01-01 RX ADMIN — DEXAMETHASONE SODIUM PHOSPHATE 4 MG: 4 INJECTION, SOLUTION INTRAMUSCULAR; INTRAVENOUS at 13:54

## 2019-01-01 RX ADMIN — OXYCODONE HYDROCHLORIDE 5 MG: 5 TABLET ORAL at 09:02

## 2019-01-01 RX ADMIN — HYDROMORPHONE HYDROCHLORIDE 1 MG: 1 INJECTION, SOLUTION INTRAMUSCULAR; INTRAVENOUS; SUBCUTANEOUS at 17:53

## 2019-01-01 RX ADMIN — MORPHINE SULFATE 5 MG: 10 INJECTION INTRAVENOUS at 06:21

## 2019-01-01 RX ADMIN — ENOXAPARIN SODIUM 40 MG: 40 INJECTION SUBCUTANEOUS at 09:10

## 2019-01-01 RX ADMIN — HYDROMORPHONE HYDROCHLORIDE 1 MG: 1 INJECTION, SOLUTION INTRAMUSCULAR; INTRAVENOUS; SUBCUTANEOUS at 13:00

## 2019-01-01 RX ADMIN — LORAZEPAM 0.5 MG: 2 INJECTION INTRAMUSCULAR; INTRAVENOUS at 23:08

## 2019-01-01 RX ADMIN — HYDROMORPHONE HYDROCHLORIDE 1 MG: 1 INJECTION, SOLUTION INTRAMUSCULAR; INTRAVENOUS; SUBCUTANEOUS at 09:53

## 2019-01-01 RX ADMIN — VANCOMYCIN HYDROCHLORIDE 750 MG: 750 INJECTION, SOLUTION INTRAVENOUS at 00:33

## 2019-01-01 RX ADMIN — ONDANSETRON 8 MG: 2 INJECTION INTRAMUSCULAR; INTRAVENOUS at 11:23

## 2019-01-01 RX ADMIN — DEXAMETHASONE SODIUM PHOSPHATE 4 MG: 4 INJECTION, SOLUTION INTRAMUSCULAR; INTRAVENOUS at 15:19

## 2019-01-01 RX ADMIN — OXYCODONE HYDROCHLORIDE 10 MG: 10 TABLET ORAL at 11:25

## 2019-01-01 RX ADMIN — LEVETIRACETAM 500 MG: 500 TABLET ORAL at 09:12

## 2019-01-01 RX ADMIN — DEXTROSE, SODIUM CHLORIDE, AND POTASSIUM CHLORIDE 75 ML/HR: 5; .45; .15 INJECTION INTRAVENOUS at 10:16

## 2019-01-01 RX ADMIN — ONDANSETRON 8 MG: 2 INJECTION INTRAMUSCULAR; INTRAVENOUS at 02:51

## 2019-01-01 RX ADMIN — GLYCOPYRROLATE 0.2 MG: 0.2 INJECTION, SOLUTION INTRAMUSCULAR; INTRAVENOUS at 17:55

## 2019-01-01 RX ADMIN — LEVETIRACETAM 500 MG: 100 INJECTION, SOLUTION INTRAVENOUS at 08:45

## 2019-01-01 RX ADMIN — HYDROMORPHONE HYDROCHLORIDE 0.5 MG/HR: 10 INJECTION, SOLUTION INTRAMUSCULAR; INTRAVENOUS; SUBCUTANEOUS at 15:42

## 2019-01-01 RX ADMIN — MORPHINE SULFATE 30 MG: 30 TABLET, FILM COATED, EXTENDED RELEASE ORAL at 09:23

## 2019-01-01 RX ADMIN — MORPHINE SULFATE 5 MG: 10 INJECTION INTRAVENOUS at 18:02

## 2019-01-01 RX ADMIN — Medication 1000 MG: at 23:38

## 2019-01-01 RX ADMIN — ACETAMINOPHEN 975 MG: 325 TABLET, FILM COATED ORAL at 05:47

## 2019-01-01 RX ADMIN — ENOXAPARIN SODIUM 40 MG: 40 INJECTION SUBCUTANEOUS at 09:26

## 2019-01-01 RX ADMIN — DIPHENHYDRAMINE HYDROCHLORIDE 25 MG: 50 INJECTION, SOLUTION INTRAMUSCULAR; INTRAVENOUS at 09:44

## 2019-01-01 RX ADMIN — DEXTROSE AND SODIUM CHLORIDE 50 ML/HR: 5; .9 INJECTION, SOLUTION INTRAVENOUS at 12:09

## 2019-01-01 RX ADMIN — PROMETHAZINE HYDROCHLORIDE 25 MG: 25 INJECTION INTRAMUSCULAR; INTRAVENOUS at 21:23

## 2019-01-01 RX ADMIN — SODIUM CHLORIDE 1000 ML: 0.9 INJECTION, SOLUTION INTRAVENOUS at 19:41

## 2019-01-01 RX ADMIN — ENOXAPARIN SODIUM 40 MG: 40 INJECTION SUBCUTANEOUS at 08:58

## 2019-01-01 RX ADMIN — MORPHINE SULFATE 4 MG: 4 INJECTION INTRAVENOUS at 14:51

## 2019-01-01 RX ADMIN — OXYCODONE HYDROCHLORIDE 10 MG: 10 TABLET ORAL at 05:24

## 2019-01-01 RX ADMIN — IPRATROPIUM BROMIDE 0.5 MG: 0.5 SOLUTION RESPIRATORY (INHALATION) at 07:54

## 2019-01-01 RX ADMIN — ONDANSETRON 4 MG: 4 TABLET, ORALLY DISINTEGRATING ORAL at 19:06

## 2019-01-01 RX ADMIN — DEXTROSE 20 ML/HR: 5 SOLUTION INTRAVENOUS at 09:47

## 2019-01-01 RX ADMIN — HYDROMORPHONE HYDROCHLORIDE 1 MG: 1 INJECTION, SOLUTION INTRAMUSCULAR; INTRAVENOUS; SUBCUTANEOUS at 18:18

## 2019-01-01 RX ADMIN — METRONIDAZOLE 500 MG: 500 INJECTION, SOLUTION INTRAVENOUS at 05:10

## 2019-01-01 RX ADMIN — PANTOPRAZOLE SODIUM 40 MG: 40 INJECTION, POWDER, FOR SOLUTION INTRAVENOUS at 20:52

## 2019-01-01 RX ADMIN — GABAPENTIN 600 MG: 300 CAPSULE ORAL at 17:34

## 2019-01-01 RX ADMIN — LIDOCAINE HYDROCHLORIDE 15 ML: 20 SOLUTION ORAL; TOPICAL at 15:39

## 2019-01-01 RX ADMIN — ONDANSETRON 8 MG: 2 INJECTION INTRAMUSCULAR; INTRAVENOUS at 00:25

## 2019-01-01 RX ADMIN — DEXAMETHASONE SODIUM PHOSPHATE 4 MG: 4 INJECTION, SOLUTION INTRAMUSCULAR; INTRAVENOUS at 08:44

## 2019-01-01 RX ADMIN — VANCOMYCIN HYDROCHLORIDE 750 MG: 750 INJECTION, SOLUTION INTRAVENOUS at 14:54

## 2019-01-01 RX ADMIN — LIDOCAINE HYDROCHLORIDE,EPINEPHRINE BITARTRATE 5 ML: 10; .01 INJECTION, SOLUTION INFILTRATION; PERINEURAL at 14:29

## 2019-01-01 RX ADMIN — POTASSIUM CHLORIDE 20 MEQ: 200 INJECTION, SOLUTION INTRAVENOUS at 20:41

## 2019-01-01 RX ADMIN — DEXTROSE AND SODIUM CHLORIDE 50 ML/HR: 5; .9 INJECTION, SOLUTION INTRAVENOUS at 10:23

## 2019-01-01 RX ADMIN — LEVETIRACETAM 500 MG: 100 SOLUTION ORAL at 18:26

## 2019-01-01 RX ADMIN — MORPHINE SULFATE 30 MG: 30 TABLET, FILM COATED, EXTENDED RELEASE ORAL at 22:12

## 2019-01-01 RX ADMIN — LEVETIRACETAM 500 MG: 500 TABLET ORAL at 22:50

## 2019-01-01 RX ADMIN — LEVETIRACETAM 500 MG: 100 INJECTION, SOLUTION INTRAVENOUS at 23:09

## 2019-01-01 RX ADMIN — IPRATROPIUM BROMIDE 0.5 MG: 0.5 SOLUTION RESPIRATORY (INHALATION) at 20:14

## 2019-01-01 RX ADMIN — HYDROMORPHONE HYDROCHLORIDE 0.5 MG: 1 INJECTION, SOLUTION INTRAMUSCULAR; INTRAVENOUS; SUBCUTANEOUS at 15:18

## 2019-01-01 RX ADMIN — MORPHINE SULFATE 4 MG: 4 INJECTION INTRAVENOUS at 19:50

## 2019-01-01 RX ADMIN — ACETAMINOPHEN 975 MG: 325 TABLET, FILM COATED ORAL at 21:05

## 2019-01-01 RX ADMIN — LORAZEPAM 0.5 MG: 2 INJECTION INTRAMUSCULAR; INTRAVENOUS at 15:01

## 2019-01-01 RX ADMIN — GABAPENTIN 600 MG: 300 CAPSULE ORAL at 09:27

## 2019-01-01 RX ADMIN — POTASSIUM CHLORIDE 40 MEQ: 400 INJECTION, SOLUTION INTRAVENOUS at 18:00

## 2019-01-01 RX ADMIN — LEVETIRACETAM 500 MG: 100 SOLUTION ORAL at 20:15

## 2019-01-01 RX ADMIN — LORAZEPAM 0.5 MG: 2 INJECTION INTRAMUSCULAR; INTRAVENOUS at 19:07

## 2019-01-01 RX ADMIN — FENTANYL CITRATE 25 MCG: 50 INJECTION, SOLUTION INTRAMUSCULAR; INTRAVENOUS at 14:13

## 2019-01-01 RX ADMIN — PANTOPRAZOLE SODIUM 40 MG: 40 TABLET, DELAYED RELEASE ORAL at 09:10

## 2019-01-01 RX ADMIN — IPRATROPIUM BROMIDE 0.5 MG: 0.5 SOLUTION RESPIRATORY (INHALATION) at 14:10

## 2019-01-01 RX ADMIN — DEXTROSE AND SODIUM CHLORIDE 50 ML/HR: 5; .9 INJECTION, SOLUTION INTRAVENOUS at 05:47

## 2019-01-01 RX ADMIN — GABAPENTIN 600 MG: 300 CAPSULE ORAL at 17:36

## 2019-01-01 RX ADMIN — PANTOPRAZOLE SODIUM 40 MG: 40 TABLET, DELAYED RELEASE ORAL at 08:19

## 2019-01-01 RX ADMIN — CEFEPIME 2000 MG: 2 INJECTION, POWDER, FOR SOLUTION INTRAVENOUS at 00:25

## 2019-01-01 RX ADMIN — LEVETIRACETAM 500 MG: 500 TABLET ORAL at 09:26

## 2019-01-01 RX ADMIN — OXYCODONE HYDROCHLORIDE AND ACETAMINOPHEN 1 TABLET: 5; 325 TABLET ORAL at 16:31

## 2019-01-01 RX ADMIN — Medication 250 MG: at 17:36

## 2019-01-01 RX ADMIN — MORPHINE SULFATE 2 MG: 2 INJECTION, SOLUTION INTRAMUSCULAR; INTRAVENOUS at 03:41

## 2019-01-01 RX ADMIN — HYDROMORPHONE HYDROCHLORIDE 1 MG: 1 INJECTION, SOLUTION INTRAMUSCULAR; INTRAVENOUS; SUBCUTANEOUS at 02:30

## 2019-01-01 RX ADMIN — ONDANSETRON 4 MG: 2 INJECTION INTRAMUSCULAR; INTRAVENOUS at 08:02

## 2019-01-01 RX ADMIN — Medication 1000 MG: at 01:38

## 2019-01-01 RX ADMIN — LORAZEPAM 0.5 MG: 2 INJECTION INTRAMUSCULAR; INTRAVENOUS at 03:19

## 2019-01-01 RX ADMIN — MORPHINE SULFATE 5 MG: 10 INJECTION INTRAVENOUS at 22:37

## 2019-01-01 RX ADMIN — OXYCODONE HYDROCHLORIDE 10 MG: 10 TABLET ORAL at 18:32

## 2019-01-01 RX ADMIN — LORAZEPAM 0.5 MG: 2 INJECTION INTRAMUSCULAR; INTRAVENOUS at 11:38

## 2019-01-01 RX ADMIN — ONDANSETRON 4 MG: 4 TABLET, ORALLY DISINTEGRATING ORAL at 09:43

## 2019-01-01 RX ADMIN — ACETAMINOPHEN 975 MG: 325 TABLET, FILM COATED ORAL at 14:54

## 2019-01-01 RX ADMIN — LEVETIRACETAM 500 MG: 100 SOLUTION ORAL at 22:31

## 2019-01-01 RX ADMIN — IPRATROPIUM BROMIDE 0.5 MG: 0.5 SOLUTION RESPIRATORY (INHALATION) at 13:25

## 2019-01-01 RX ADMIN — MORPHINE SULFATE 5 MG: 10 INJECTION INTRAVENOUS at 02:27

## 2019-01-01 RX ADMIN — Medication 1000 MG: at 22:47

## 2019-01-01 RX ADMIN — MORPHINE SULFATE 2 MG: 2 INJECTION, SOLUTION INTRAMUSCULAR; INTRAVENOUS at 17:45

## 2019-01-01 RX ADMIN — PROMETHAZINE HYDROCHLORIDE 25 MG: 25 INJECTION INTRAMUSCULAR; INTRAVENOUS at 19:42

## 2019-01-01 RX ADMIN — SODIUM CHLORIDE 1000 ML: 0.9 INJECTION, SOLUTION INTRAVENOUS at 11:15

## 2019-01-01 RX ADMIN — ACETAMINOPHEN 975 MG: 325 TABLET, FILM COATED ORAL at 13:07

## 2019-01-01 RX ADMIN — ENOXAPARIN SODIUM 40 MG: 40 INJECTION SUBCUTANEOUS at 09:01

## 2019-01-01 RX ADMIN — METRONIDAZOLE 500 MG: 500 INJECTION, SOLUTION INTRAVENOUS at 20:09

## 2019-01-01 RX ADMIN — LORAZEPAM 0.5 MG: 2 INJECTION INTRAMUSCULAR; INTRAVENOUS at 09:51

## 2019-01-01 RX ADMIN — METOCLOPRAMIDE HYDROCHLORIDE 10 MG: 5 INJECTION INTRAMUSCULAR; INTRAVENOUS at 21:44

## 2019-01-01 RX ADMIN — PANTOPRAZOLE SODIUM 40 MG: 40 INJECTION, POWDER, FOR SOLUTION INTRAVENOUS at 08:58

## 2019-01-01 RX ADMIN — POLYETHYLENE GLYCOL 3350 17 G: 17 POWDER, FOR SOLUTION ORAL at 11:24

## 2019-01-01 RX ADMIN — PANTOPRAZOLE SODIUM 40 MG: 40 INJECTION, POWDER, FOR SOLUTION INTRAVENOUS at 21:45

## 2019-01-01 RX ADMIN — MORPHINE SULFATE 2 MG: 2 INJECTION, SOLUTION INTRAMUSCULAR; INTRAVENOUS at 10:33

## 2019-01-01 RX ADMIN — LEVALBUTEROL HYDROCHLORIDE 1.25 MG: 1.25 SOLUTION, CONCENTRATE RESPIRATORY (INHALATION) at 07:51

## 2019-01-01 RX ADMIN — MORPHINE SULFATE 4 MG: 4 INJECTION INTRAVENOUS at 00:11

## 2019-01-01 RX ADMIN — METRONIDAZOLE 500 MG: 500 INJECTION, SOLUTION INTRAVENOUS at 06:46

## 2019-01-01 RX ADMIN — MAGNESIUM SULFATE HEPTAHYDRATE 2 G: 40 INJECTION, SOLUTION INTRAVENOUS at 18:00

## 2019-01-01 RX ADMIN — OXYCODONE HYDROCHLORIDE AND ACETAMINOPHEN 1 TABLET: 5; 325 TABLET ORAL at 06:55

## 2019-01-01 RX ADMIN — ENOXAPARIN SODIUM 40 MG: 40 INJECTION SUBCUTANEOUS at 08:56

## 2019-01-01 RX ADMIN — Medication 1000 MG: at 23:42

## 2019-01-01 RX ADMIN — ACETAMINOPHEN 975 MG: 325 TABLET, FILM COATED ORAL at 06:15

## 2019-01-01 RX ADMIN — POTASSIUM CHLORIDE 20 MEQ: 200 INJECTION, SOLUTION INTRAVENOUS at 12:59

## 2019-01-01 RX ADMIN — Medication 1000 MG: at 10:56

## 2019-01-01 RX ADMIN — METOCLOPRAMIDE 10 MG: 5 INJECTION, SOLUTION INTRAMUSCULAR; INTRAVENOUS at 15:40

## 2019-01-01 RX ADMIN — IPRATROPIUM BROMIDE 0.5 MG: 0.5 SOLUTION RESPIRATORY (INHALATION) at 07:51

## 2019-01-01 RX ADMIN — LEVETIRACETAM 500 MG: 100 INJECTION, SOLUTION INTRAVENOUS at 09:05

## 2019-01-01 RX ADMIN — ONDANSETRON 8 MG: 2 INJECTION INTRAMUSCULAR; INTRAVENOUS at 02:45

## 2019-01-01 RX ADMIN — ACETAMINOPHEN 975 MG: 325 TABLET, FILM COATED ORAL at 22:13

## 2019-01-01 RX ADMIN — POTASSIUM CHLORIDE 40 MEQ: 400 INJECTION, SOLUTION INTRAVENOUS at 07:28

## 2019-01-01 RX ADMIN — ONDANSETRON 8 MG: 2 INJECTION INTRAMUSCULAR; INTRAVENOUS at 03:00

## 2019-01-01 RX ADMIN — IPRATROPIUM BROMIDE 0.5 MG: 0.5 SOLUTION RESPIRATORY (INHALATION) at 06:44

## 2019-01-01 RX ADMIN — PROMETHAZINE HYDROCHLORIDE 25 MG: 25 INJECTION INTRAMUSCULAR; INTRAVENOUS at 11:28

## 2019-01-01 RX ADMIN — LEVETIRACETAM 500 MG: 100 INJECTION, SOLUTION INTRAVENOUS at 09:35

## 2019-01-01 RX ADMIN — PROPOFOL 80 MG: 10 INJECTION, EMULSION INTRAVENOUS at 10:46

## 2019-01-01 RX ADMIN — METRONIDAZOLE 500 MG: 500 INJECTION, SOLUTION INTRAVENOUS at 17:37

## 2019-01-01 RX ADMIN — MORPHINE SULFATE 2 MG: 2 INJECTION, SOLUTION INTRAMUSCULAR; INTRAVENOUS at 12:26

## 2019-01-01 RX ADMIN — ENOXAPARIN SODIUM 40 MG: 40 INJECTION SUBCUTANEOUS at 08:47

## 2019-01-01 RX ADMIN — ONDANSETRON 8 MG: 2 INJECTION INTRAMUSCULAR; INTRAVENOUS at 10:34

## 2019-01-01 RX ADMIN — HYDROMORPHONE HYDROCHLORIDE 1 MG: 1 INJECTION, SOLUTION INTRAMUSCULAR; INTRAVENOUS; SUBCUTANEOUS at 11:59

## 2019-01-01 RX ADMIN — FAMOTIDINE 20 MG: 20 TABLET ORAL at 22:13

## 2019-01-01 RX ADMIN — MORPHINE SULFATE 2 MG: 2 INJECTION, SOLUTION INTRAMUSCULAR; INTRAVENOUS at 02:00

## 2019-01-01 RX ADMIN — OXYCODONE HYDROCHLORIDE 10 MG: 10 TABLET ORAL at 21:26

## 2019-01-01 RX ADMIN — PANTOPRAZOLE SODIUM 40 MG: 40 INJECTION, POWDER, FOR SOLUTION INTRAVENOUS at 08:02

## 2019-01-01 RX ADMIN — LEVETIRACETAM 500 MG: 100 INJECTION, SOLUTION INTRAVENOUS at 08:58

## 2019-01-01 RX ADMIN — METOCLOPRAMIDE HYDROCHLORIDE 10 MG: 5 INJECTION INTRAMUSCULAR; INTRAVENOUS at 08:57

## 2019-01-01 RX ADMIN — Medication 250 MG: at 09:11

## 2019-01-01 RX ADMIN — Medication 1000 MG: at 11:57

## 2019-01-01 RX ADMIN — DEXTROSE, SODIUM CHLORIDE, AND POTASSIUM CHLORIDE 75 ML/HR: 5; .45; .15 INJECTION INTRAVENOUS at 05:18

## 2019-01-01 RX ADMIN — HYDROMORPHONE HYDROCHLORIDE 1 MG: 1 INJECTION, SOLUTION INTRAMUSCULAR; INTRAVENOUS; SUBCUTANEOUS at 21:33

## 2019-01-01 RX ADMIN — PANTOPRAZOLE SODIUM 40 MG: 40 INJECTION, POWDER, FOR SOLUTION INTRAVENOUS at 21:23

## 2019-01-01 RX ADMIN — SODIUM CHLORIDE, SODIUM GLUCONATE, SODIUM ACETATE, POTASSIUM CHLORIDE, MAGNESIUM CHLORIDE, SODIUM PHOSPHATE, DIBASIC, AND POTASSIUM PHOSPHATE 75 ML/HR: .53; .5; .37; .037; .03; .012; .00082 INJECTION, SOLUTION INTRAVENOUS at 06:46

## 2019-01-01 RX ADMIN — HYDROMORPHONE HYDROCHLORIDE 1 MG: 1 INJECTION, SOLUTION INTRAMUSCULAR; INTRAVENOUS; SUBCUTANEOUS at 05:36

## 2019-01-01 RX ADMIN — CEFAZOLIN SODIUM 1000 MG: 1 SOLUTION INTRAVENOUS at 11:27

## 2019-01-01 RX ADMIN — METRONIDAZOLE 500 MG: 500 INJECTION, SOLUTION INTRAVENOUS at 11:44

## 2019-01-01 RX ADMIN — IPRATROPIUM BROMIDE 0.5 MG: 0.5 SOLUTION RESPIRATORY (INHALATION) at 13:10

## 2019-01-01 RX ADMIN — OXYCODONE HYDROCHLORIDE AND ACETAMINOPHEN 1 TABLET: 5; 325 TABLET ORAL at 22:30

## 2019-01-01 RX ADMIN — MORPHINE SULFATE 4 MG: 4 INJECTION INTRAVENOUS at 02:45

## 2019-01-01 RX ADMIN — MORPHINE SULFATE 2 MG: 2 INJECTION, SOLUTION INTRAMUSCULAR; INTRAVENOUS at 19:06

## 2019-01-01 RX ADMIN — LEVETIRACETAM 500 MG: 100 SOLUTION ORAL at 11:08

## 2019-01-01 RX ADMIN — LEVALBUTEROL HYDROCHLORIDE 1.25 MG: 1.25 SOLUTION, CONCENTRATE RESPIRATORY (INHALATION) at 06:44

## 2019-01-01 RX ADMIN — IPRATROPIUM BROMIDE AND ALBUTEROL SULFATE 3 ML: 2.5; .5 SOLUTION RESPIRATORY (INHALATION) at 17:34

## 2019-01-01 RX ADMIN — METOCLOPRAMIDE HYDROCHLORIDE 10 MG: 5 INJECTION INTRAMUSCULAR; INTRAVENOUS at 11:44

## 2019-01-01 RX ADMIN — MORPHINE SULFATE 4 MG: 4 INJECTION INTRAVENOUS at 07:28

## 2019-01-01 RX ADMIN — ONDANSETRON 8 MG: 2 INJECTION INTRAMUSCULAR; INTRAVENOUS at 08:47

## 2019-01-01 RX ADMIN — AMPICILLIN SODIUM AND SULBACTAM SODIUM 3 G: 2; 1 INJECTION, POWDER, FOR SOLUTION INTRAMUSCULAR; INTRAVENOUS at 03:48

## 2019-01-01 RX ADMIN — GABAPENTIN 600 MG: 300 CAPSULE ORAL at 08:19

## 2019-01-01 RX ADMIN — IOHEXOL 85 ML: 350 INJECTION, SOLUTION INTRAVENOUS at 08:16

## 2019-01-01 RX ADMIN — DEXAMETHASONE SODIUM PHOSPHATE 4 MG: 4 INJECTION, SOLUTION INTRAMUSCULAR; INTRAVENOUS at 17:55

## 2019-01-01 RX ADMIN — ONDANSETRON 4 MG: 4 TABLET, ORALLY DISINTEGRATING ORAL at 21:56

## 2019-01-01 RX ADMIN — ACETAMINOPHEN 975 MG: 325 TABLET, FILM COATED ORAL at 22:50

## 2019-01-01 RX ADMIN — SENNOSIDES 25.8 MG: 8.6 TABLET, FILM COATED ORAL at 00:26

## 2019-01-01 RX ADMIN — LORAZEPAM 0.5 MG: 2 INJECTION INTRAMUSCULAR; INTRAVENOUS at 08:48

## 2019-01-01 RX ADMIN — Medication 1000 MG: at 11:34

## 2019-01-01 RX ADMIN — ENOXAPARIN SODIUM 40 MG: 40 INJECTION SUBCUTANEOUS at 08:43

## 2019-01-01 RX ADMIN — LEVALBUTEROL HYDROCHLORIDE 1.25 MG: 1.25 SOLUTION, CONCENTRATE RESPIRATORY (INHALATION) at 20:51

## 2019-01-01 RX ADMIN — ACETAMINOPHEN 975 MG: 325 TABLET, FILM COATED ORAL at 14:13

## 2019-01-01 RX ADMIN — Medication 250 MG: at 17:54

## 2019-01-01 RX ADMIN — IPRATROPIUM BROMIDE 0.5 MG: 0.5 SOLUTION RESPIRATORY (INHALATION) at 07:15

## 2019-01-01 RX ADMIN — ONDANSETRON 8 MG: 2 INJECTION INTRAMUSCULAR; INTRAVENOUS at 10:58

## 2019-01-01 RX ADMIN — LEVETIRACETAM 500 MG: 100 INJECTION, SOLUTION INTRAVENOUS at 21:16

## 2019-01-01 RX ADMIN — METRONIDAZOLE 500 MG: 500 INJECTION, SOLUTION INTRAVENOUS at 15:20

## 2019-01-01 RX ADMIN — ONDANSETRON 8 MG: 2 INJECTION INTRAMUSCULAR; INTRAVENOUS at 15:56

## 2019-01-01 RX ADMIN — CEFEPIME 2000 MG: 2 INJECTION, POWDER, FOR SOLUTION INTRAVENOUS at 12:13

## 2019-01-01 RX ADMIN — MORPHINE SULFATE 30 MG: 30 TABLET, FILM COATED, EXTENDED RELEASE ORAL at 22:06

## 2019-01-01 RX ADMIN — MORPHINE SULFATE 30 MG: 30 TABLET, FILM COATED, EXTENDED RELEASE ORAL at 21:56

## 2019-01-01 RX ADMIN — LIDOCAINE HYDROCHLORIDE,EPINEPHRINE BITARTRATE 5 ML: 10; .01 INJECTION, SOLUTION INFILTRATION; PERINEURAL at 14:27

## 2019-01-01 RX ADMIN — METOCLOPRAMIDE HYDROCHLORIDE 10 MG: 5 INJECTION INTRAMUSCULAR; INTRAVENOUS at 02:19

## 2019-01-01 RX ADMIN — CEFAZOLIN SODIUM 1000 MG: 1 SOLUTION INTRAVENOUS at 13:10

## 2019-01-01 RX ADMIN — ONDANSETRON 8 MG: 2 INJECTION INTRAMUSCULAR; INTRAVENOUS at 17:54

## 2019-01-01 RX ADMIN — ONDANSETRON 8 MG: 2 INJECTION INTRAMUSCULAR; INTRAVENOUS at 11:26

## 2019-01-01 RX ADMIN — DICYCLOMINE HYDROCHLORIDE 20 MG: 20 TABLET ORAL at 16:31

## 2019-01-01 RX ADMIN — METRONIDAZOLE 500 MG: 500 INJECTION, SOLUTION INTRAVENOUS at 20:15

## 2019-01-01 RX ADMIN — PROCHLORPERAZINE MALEATE 5 MG: 10 TABLET, FILM COATED ORAL at 18:18

## 2019-01-01 RX ADMIN — PANTOPRAZOLE SODIUM 40 MG: 40 TABLET, DELAYED RELEASE ORAL at 09:11

## 2019-01-01 RX ADMIN — GABAPENTIN 600 MG: 300 CAPSULE ORAL at 09:22

## 2019-01-01 RX ADMIN — Medication 10 ML: at 23:00

## 2019-01-01 RX ADMIN — LORAZEPAM 0.5 MG: 2 INJECTION INTRAMUSCULAR; INTRAVENOUS at 15:23

## 2019-01-01 RX ADMIN — LORAZEPAM 0.5 MG: 2 INJECTION INTRAMUSCULAR; INTRAVENOUS at 21:45

## 2019-01-01 RX ADMIN — MORPHINE SULFATE 4 MG: 4 INJECTION INTRAVENOUS at 20:50

## 2019-01-01 RX ADMIN — MORPHINE SULFATE 2 MG: 2 INJECTION, SOLUTION INTRAMUSCULAR; INTRAVENOUS at 08:48

## 2019-01-01 RX ADMIN — ACETAMINOPHEN 975 MG: 325 TABLET, FILM COATED ORAL at 22:06

## 2019-01-01 RX ADMIN — LEVALBUTEROL HYDROCHLORIDE 1.25 MG: 1.25 SOLUTION, CONCENTRATE RESPIRATORY (INHALATION) at 13:21

## 2019-01-01 RX ADMIN — PROMETHAZINE HYDROCHLORIDE 25 MG: 25 INJECTION INTRAMUSCULAR; INTRAVENOUS at 19:37

## 2019-01-01 RX ADMIN — PANTOPRAZOLE SODIUM 40 MG: 40 TABLET, DELAYED RELEASE ORAL at 09:23

## 2019-01-01 RX ADMIN — OXYCODONE HYDROCHLORIDE 10 MG: 10 TABLET ORAL at 11:23

## 2019-01-01 RX ADMIN — CEFAZOLIN SODIUM 1000 MG: 1 SOLUTION INTRAVENOUS at 04:24

## 2019-01-01 RX ADMIN — SODIUM CHLORIDE, SODIUM GLUCONATE, SODIUM ACETATE, POTASSIUM CHLORIDE, MAGNESIUM CHLORIDE, SODIUM PHOSPHATE, DIBASIC, AND POTASSIUM PHOSPHATE 75 ML/HR: .53; .5; .37; .037; .03; .012; .00082 INJECTION, SOLUTION INTRAVENOUS at 10:28

## 2019-01-01 RX ADMIN — Medication 1000 MG: at 00:00

## 2019-01-01 RX ADMIN — OXYCODONE HYDROCHLORIDE 10 MG: 10 TABLET ORAL at 04:24

## 2019-03-12 NOTE — TELEPHONE ENCOUNTER
Sched NP appt 4/16/19 with Dr Florinda Ashraf in [facility] [place]  Mailed NP paperwork to pt's home  States she saw Dr Maris Mckeon in [facility] [phone]    Advised her we will need records and Physicians order prior to appt or appt will be cx'd

## 2019-04-11 PROBLEM — K21.9 GASTRO-ESOPHAGEAL REFLUX DISEASE WITHOUT ESOPHAGITIS: Status: ACTIVE | Noted: 2019-01-01

## 2019-04-11 PROBLEM — Z85.01 PERSONAL HISTORY OF MALIGNANT NEOPLASM OF ESOPHAGUS: Status: ACTIVE | Noted: 2019-01-01

## 2019-04-25 PROBLEM — C15.4 MALIGNANT NEOPLASM OF MIDDLE THIRD OF ESOPHAGUS (HCC): Status: ACTIVE | Noted: 2019-01-01

## 2019-04-25 PROBLEM — R10.84 GENERALIZED ABDOMINAL PAIN: Status: ACTIVE | Noted: 2019-01-01

## 2019-04-25 PROBLEM — Z85.3 HISTORY OF BREAST CANCER: Status: ACTIVE | Noted: 2019-01-01

## 2019-06-17 PROBLEM — R10.9 ABDOMINAL PAIN: Status: ACTIVE | Noted: 2019-01-01

## 2019-06-17 PROBLEM — R52 INTRACTABLE PAIN: Status: ACTIVE | Noted: 2019-01-01

## 2019-06-17 PROBLEM — J95.811 PNEUMOTHORAX AFTER BIOPSY: Status: ACTIVE | Noted: 2019-01-01

## 2019-06-22 PROBLEM — L03.313 CELLULITIS OF CHEST WALL: Status: ACTIVE | Noted: 2019-01-01

## 2019-06-23 PROBLEM — R10.9 ABDOMINAL PAIN: Status: RESOLVED | Noted: 2019-01-01 | Resolved: 2019-01-01

## 2019-06-23 PROBLEM — Z85.01 PERSONAL HISTORY OF MALIGNANT NEOPLASM OF ESOPHAGUS: Status: RESOLVED | Noted: 2019-01-01 | Resolved: 2019-01-01

## 2019-06-23 PROBLEM — J95.811 PNEUMOTHORAX AFTER BIOPSY: Status: RESOLVED | Noted: 2019-01-01 | Resolved: 2019-01-01

## 2019-06-25 PROBLEM — K59.03 THERAPEUTIC OPIOID-INDUCED CONSTIPATION (OIC): Status: ACTIVE | Noted: 2019-01-01

## 2019-06-25 PROBLEM — T40.2X5A THERAPEUTIC OPIOID-INDUCED CONSTIPATION (OIC): Status: ACTIVE | Noted: 2019-01-01

## 2019-06-25 PROBLEM — G89.3 CANCER ASSOCIATED PAIN: Status: ACTIVE | Noted: 2019-01-01

## 2019-06-26 NOTE — PROGRESS NOTES
Pt here for initial chemotherapy, pt oriented to infusion, states that she vomited last night, unsure whether or not it was related to the new probiotic she started taking  Pt offers no other complaints at this time, resting comfortably  Vitals stable  Labs reviewed-within parameters for treatment  Ok per [facility] to proceed with BMP for initial cycle  Call bell in reach, will continue to monitor

## 2019-06-26 NOTE — PROGRESS NOTES
Pt tolerated treatment well  When connecting patient to cadd, pt started vomiting  Pt stated that she was biting her nails and believes a piece of her nail is stuck in her throat causing her to gag  Pt states that she is not nauseous  Vomit is a mucous and light brown in color  Pt states that this is how she felt last night   encouraged patient to contact provider if vomiting persists, and to take zofran when she gets home  Pt reviewed [employer] CADD video at home, reviewed pump with patient today  Pt has no questions at this time  [employer] starter package explained and sent home with patient  CADD connected after two RN check  Green light flashing, screen displays running upon discharge  Pt aware to return 6/28/2018 at 1230 for disconnect,  AVS provided  Pt stated she felt better prior to discharge

## 2019-06-28 PROBLEM — E86.0 DEHYDRATION: Status: ACTIVE | Noted: 2019-01-01

## 2019-06-28 NOTE — PROGRESS NOTES
Pt tolerated treatment without difficulty or complaint  Pt AVS Provided   Pt D/rajwinder in stable condition

## 2019-06-29 PROBLEM — E44.0 MODERATE PROTEIN-CALORIE MALNUTRITION (HCC): Status: ACTIVE | Noted: 2019-01-01

## 2019-06-29 PROBLEM — J96.01 ACUTE RESPIRATORY FAILURE WITH HYPOXIA (HCC): Status: ACTIVE | Noted: 2019-01-01

## 2019-06-29 PROBLEM — J69.0 ASPIRATION PNEUMONIA (HCC): Status: ACTIVE | Noted: 2019-01-01

## 2019-06-29 NOTE — UTILIZATION REVIEW
Initial Clinical Review    Admission: Date/Time/Statement: 6/28/19 @ 2337 OBSERVATION  Orders Placed This Encounter   Procedures    Place in Observation     Standing Status:   Standing     Number of Occurrences:   1     Order Specific Question:   Admitting Physician     Answer:   Luis Flowers [[id number]]     Order Specific Question:   Level of Care     Answer:   Med Surg [[id number]]     ED Arrival Information     Expected Arrival Acuity Means of Arrival Escorted By Service Admission Type    - 6/28/2019 18:04 Emergent [facility] Emergency    Arrival Complaint    -        Chief Complaint   Patient presents with    Vomiting     pt finished 3rd round of chemo today, unable to keep anything down  was given fluids and nausea medicatiojns this morning but feels that it was not enough  pt vomiting during triage     Assessment/Plan:     Tobias Uribe is a 59-year-old female with metastatic esophageal cancer undergoing palliative chemotherapy with FOLFox  She presents with intractable nausea and vomiting which she has been having over last 24-48 hours  She reports vomiting mucus material   She denies any hematemesis  She thinks she has vomited about 25 times in the last 24 hours  Appetite has been extremely poor  She also reports congested cough  She denies any fevers or chills  She denies any shortness of breath  No chest pain  No abdominal pain  Denies any dysphagia  Denies any projectile vomiting  Denies any prior stent placement in her esophagus      In the ED, she continued to have nausea and vomiting symptoms even with IV antiemetic therapy  Medicine was consulted for admission and management given concern of intractable nausea/vomiting symptoms  During my evaluation, patient's cough sounded significantly congested  Chest x-ray obtained suggests right-sided pneumonia concerning for aspiration  Unasyn was ordered for aspiration pneumonia coverage    Of note, patient has been receiving Keflex as an outpatient for recent chest wall cellulitis diagnosis which she is supposed to complete on June 28th  Aspiration pneumonia ([facility] [address] )  Assessment & Plan  - coarse breath sounds and CXR suggesting R sided PNA- likely aspiration related from multiple vomiting episodes over the last 24-48 hrs  - given that patient receiving active chemotherapy, will treat for aspiration pneumonia with antibiotics  Plan to institute Unasyn IV  Will hold p o  Keflex which patient was receiving until June 28th for chest wall cellulitis  Mucolytic therapy ordered  Chest PT     - IVF with NS at 100cc/hr      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  - 8/2017:  CT scan showed a small 5 mm left lower lung nodule, be observed  - 8-10/2017:  Finished concurrent chemo radiation with Lauree Dugan Taxol  - 3/2018:  Repeat EGD showed local recurrence, status post esophagectomy, complicated by esophagus week age, status post stent placement, final surgery showed negative margin  - 8/2018:  Surveillance imaging showed positive mediastinum lymphadenopathy, concern for metastatic disease, status post SBRT   - 11/2018:  Restaging CT chest showed left pleural based mass, concern for metastatic disease, PET-CT was denied by insurance company, patient then lost follow-up and relocated to [place]  - 4/23/2019: Adrian Obregon, showed negative for significant finding, biopsies negative for cancer   - 6/2019:  PET-CT showed diffuse metastatic lesion, including bilateral lung pleural based lesion, diffuse lymphadenopathy, including mediastinum     - t palliative chemotherapy, with FOLFOX standard dose     - plan to admit for supportive care     - antiemetics  - pain control  - IVF with NS at 100cc/hr  - plan to consult oncology for additional management recommendations      Moderate protein-calorie malnutrition ([address] )  Assessment & Plan  Malnutrition Findings:    Albumin 2 7     BMI Findings: Body mass index is 18 33 kg/m²     - ensure supplements  Nutrition consult       Cancer associated pain  Assessment & Plan  - cont home oxycodone and morphine PO     Seizure disorder (HCC)  Assessment & Plan  Continue Keppra 500 mg b i d        Gastro-esophageal reflux disease without esophagitis  Assessment & Plan  - cont home ranitidine and PPI     6/29 Hospitalist Note:  Acute respiratory failure with hypoxia (HCC)Due to multifocal pneumonia  Patient requires up to 4 L of oxygen at this time and saturates in low 90s or high 80s  Just started on broad-spectrum antibiotics this morning  Expect improvement with the antibiotic therapy  Supportive management at this time        ED Triage Vitals   Temperature Pulse Respirations Blood Pressure SpO2   06/28/19 1818 06/28/19 1818 06/28/19 1818 06/28/19 1818 06/28/19 1818   98 4 °F (36 9 °C) 89 18 140/82 90 %   Pain Score 9       Wt Readings from Last 1 Encounters:   06/29/19 44 9 kg (98 lb 15 8 oz)     Additional Vital Signs: All nasal cannula SpO2 readings on 2L O2      Date/Time  Temp  Pulse  Resp  BP  SpO2  O2 Device    06/29/19 0829  98 1 °F (36 7 °C)  86  18  131/83  87 %Abnormal   --    06/29/19 0308  --  75  --  --  94 %  --    06/29/19 0208  --  --  --  --  --  Nasal cannula    06/29/19 0030  --  76  20  139/80  96 %  Nasal cannula    06/29/19 0000  --  78  20  124/71  95 %  Nasal cannula    06/28/19 2300  --  88  20  150/96  95 %  Nasal cannula    06/28/19 2200  --  78  22  123/74  96 %  Nasal cannula    06/28/19 2030  --  75  20  130/74  96 %  Nasal cannula    06/28/19 1940  --  87  22  132/77  91 %  None (Room air)        Pertinent Labs/Diagnostic Test Results:   Results from last 7 days   Lab Units 06/29/19  0642 06/28/19  1917 06/23/19  0427 06/22/19  1257   WBC Thousand/uL 16 49* 15 21* 8 50 12 35*   HEMOGLOBIN g/dL 11 8 12 1 11 0* 11 2*   HEMATOCRIT % 36 3 36 2 33 3* 34 8   PLATELETS Thousands/uL 373 400* 404* 421*   NEUTROS ABS Thousands/µL  --   --   --  9 82*   BANDS PCT % 4 4  --   --          Results from last 7 days   Lab Units 06/29/19 0642 06/28/19 1917 06/23/19  0427   SODIUM mmol/L 138 138 137   POTASSIUM mmol/L 2 7* 3 4* 3 7   CHLORIDE mmol/L 101 98* 102   CO2 mmol/L 28 28 29   ANION GAP mmol/L 9 12 6   BUN mg/dL 8 10 7   CREATININE mg/dL 0 60 0 72 0 44*   EGFR ml/min/1 73sq m 98 90 109   CALCIUM mg/dL 9 6 10 1 9 5   MAGNESIUM mg/dL  --  1 4*  --      Results from last 7 days   Lab Units 06/28/19 1917   AST U/L 14   ALT U/L 8*   ALK PHOS U/L 84   TOTAL PROTEIN g/dL 7 4   ALBUMIN g/dL 2 7*   TOTAL BILIRUBIN mg/dL 0 40   BILIRUBIN DIRECT mg/dL 0 10         Results from last 7 days   Lab Units 06/29/19 0642 06/28/19 1917 06/23/19  0427   GLUCOSE RANDOM mg/dL 103 121 90       Results from last 7 days   Lab Units 06/22/19  1257   PROCALCITONIN ng/ml 0 19     Results from last 7 days   Lab Units 06/28/19 1917   LACTIC ACID mmol/L 1 2       Results from last 7 days   Lab Units 06/28/19  1902   CLARITY UA  Clear   COLOR UA  Yellow   SPEC GRAV UA  1 020   PH UA  6 5   GLUCOSE UA mg/dl Negative   KETONES UA mg/dl Negative   BLOOD UA  Negative   PROTEIN UA mg/dl Trace*   NITRITE UA  Negative   BILIRUBIN UA  Negative   UROBILINOGEN UA E U /dl 0 2   LEUKOCYTES UA  Negative   WBC UA /hpf 1-2*   RBC UA /hpf None Seen   BACTERIA UA /hpf Occasional   EPITHELIAL CELLS WET PREP /hpf Occasional   MUCUS THREADS  Occasional*       Results from last 7 days   Lab Units 06/22/19  1257   BLOOD CULTURE  No Growth After 5 Days  No Growth After 5 Days  6/29 Chest x-ray: B/L multilobar pneumonia  6/28 CT abdomen and pelvis: 1  Redemonstrated pleural-based masses with stable erosive change within the left lateral 9th and 10th ribs compatible with metastasis   2   Redemonstrated bibasilar scarring and bronchiectatic changes with increased patchy opacity within the right lower and middle lobes which may represent superimposed pneumonia versus aspiration  3   Diffuse thickening of the colon compatible with nonspecific colitis   4   Stable 1 cm indeterminate left adrenal nodule        ED Treatment:   Medication Administration from 06/28/2019 1804 to 06/29/2019 0145       Date/Time Order Dose Route Action     06/28/2019 1949 ondansetron (ZOFRAN) injection 4 mg 4 mg Intravenous Given     06/28/2019 2258 sodium chloride 0 9 % bolus 1,000 mL 0 mL Intravenous Stopped     06/28/2019 1941 sodium chloride 0 9 % bolus 1,000 mL 1,000 mL Intravenous New Bag     06/28/2019 1906 ondansetron (ZOFRAN-ODT) dispersible tablet 4 mg 4 mg Oral Given     06/28/2019 2050 morphine (PF) 4 mg/mL injection 4 mg 4 mg Intravenous Given     06/28/2019 2052 pantoprazole (PROTONIX) injection 40 mg 40 mg Intravenous Given     06/28/2019 2116 iohexol (OMNIPAQUE) 350 MG/ML injection (MULTI-DOSE) 100 mL 100 mL Intravenous Given     06/28/2019 2259 metoclopramide (REGLAN) injection 10 mg 10 mg Intravenous Given     06/29/2019 0013 LORazepam (ATIVAN) 2 mg/mL injection 1 mg 1 mg Intravenous Given     06/29/2019 0010 sodium chloride 0 9 % bolus 1,000 mL 1,000 mL Intravenous New Bag     06/29/2019 0011 morphine (PF) 4 mg/mL injection 4 mg 4 mg Intravenous Given     06/29/2019 0103 levETIRAcetam (KEPPRA) 500 mg in sodium chloride 0 9 % 100 mL IVPB 0 mg Intravenous Stopped     06/29/2019 0035 levETIRAcetam (KEPPRA) 500 mg in sodium chloride 0 9 % 100 mL IVPB 500 mg Intravenous New Bag        Past Medical History:   Diagnosis Date    Breast cancer ([facility] ) 2001    Esophageal cancer ([facility] )     History of chemotherapy 11/01/2001    History of radiation therapy 01/01/2002    Seizures ([facility] )     11/2014     Present on Admission:   Seizure disorder (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) [C15 4]  Colitis [K52 9]  Vomiting [R11 10]  Esophageal cancer (HCC) [C15 9]  Abdominal pain [R10 9]  Moderate protein-calorie malnutrition (HCC) [E44 0]  Intractable nausea and vomiting [R11 2]  Age/Sex: 60 y o  female     Admission Orders: Telemetry monitoring, Oncology consult, SCD  Current Facility-Administered Medications:  acetaminophen 650 mg Oral Q6H PRN   al mag oxide-diphenhydramine-lidocaine viscous 10 mL Swish & Swallow Q6H PRN   ampicillin-sulbactam 3 g Intravenous Q6H   enoxaparin 40 mg Subcutaneous Daily   famotidine 20 mg Oral Daily   gabapentin 600 mg Oral BID   guaiFENesin 600 mg Oral Q12H MAGNOLIA   levETIRAcetam 500 mg Oral BID   magnesium sulfate 2 g Intravenous Once   morphine 30 mg Oral Q12H MAGNOLIA   ondansetron 4 mg Oral Q8H PRN   oxyCODONE 10 mg Oral Q4H PRN   pantoprazole 40 mg Oral Daily   potassium chloride 40 mEq Intravenous Once   promethazine 25 mg Intramuscular Q6H PRN   saccharomyces boulardii 250 mg Oral BID   senna 3 tablet Oral HS   sodium chloride 100 mL/hr Intravenous Continuous             Network Utilization Review Department  Phone: [phone]; Fax [phone]  [email or web address]  ATTENTION: Please call with any questions or concerns to [phone]  and carefully listen to the prompts so that you are directed to the right person  Send all requests for admission clinical reviews, approved or denied determinations and any other requests to fax [phone]   All voicemails are confidential

## 2019-06-29 NOTE — ASSESSMENT & PLAN NOTE
-review chest x-ray  It reveals multifocal pneumonia  - Significantly decreased coarse breath sounds - likely aspiration related from multiple vomiting episodes over the last 24-48 hrs  - given that patient is immunocompromised due to the active chemotherapy, will treat with broad-spectrum antibiotics:  Cefepime, vancomycin and Flagyl      - IVF with NS at 75cc/hr  Patient does not appear septic at this time, but her oxygenation level is decreased

## 2019-06-29 NOTE — H&P
H&P- Julio Woodruff 1955, 60 y o  female MRN: [id number]    Unit/Bed#: SHERIN Encounter: [id number]    Primary Care Provider: Terry Green MD   Date and time admitted to hospital: 6/28/2019  6:09 PM        * Aspiration pneumonia [facility])  Assessment & Plan  - coarse breath sounds and CXR suggesting R sided PNA- likely aspiration related from multiple vomiting episodes over the last 24-48 hrs  - given that patient receiving active chemotherapy, will treat for aspiration pneumonia with antibiotics  Plan to institute Unasyn IV  Will hold p o  Keflex which patient was receiving until June 28th for chest wall cellulitis  Mucolytic therapy ordered  Chest PT     - IVF with NS at 100cc/hr  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 [facility])  Assessment & Plan  - 7/2017: Manjit Ocampo showed mid esophagus squamous cell carcinoma, 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  - 8/2017:  CT scan showed a small 5 mm left lower lung nodule, be observed  - 8-10/2017:  Finished concurrent chemo radiation with Nick Oddi Taxol  - 3/2018:  Repeat EGD showed local recurrence, status post esophagectomy, complicated by esophagus week age, status post stent placement, final surgery showed negative margin  - 8/2018:  Surveillance imaging showed positive mediastinum lymphadenopathy, concern for metastatic disease, status post SBRT   - 11/2018:  Restaging CT chest showed left pleural based mass, concern for metastatic disease, PET-CT was denied by insurance company, patient then lost follow-up and relocated to [place]  - 4/23/2019: Manjit Ocampo, showed negative for significant finding, biopsies negative for cancer   - 6/2019:  PET-CT showed diffuse metastatic lesion, including bilateral lung pleural based lesion, diffuse lymphadenopathy, including mediastinum     - t palliative chemotherapy, with FOLFOX standard dose    - plan to admit for supportive care     - antiemetics  - pain control  - IVF with NS at 100cc/hr  - plan to consult oncology for additional management recommendations       Moderate protein-calorie malnutrition (HCC)  Assessment & Plan  Malnutrition Findings:       Albumin 2 7    BMI Findings: Body mass index is 18 33 kg/m²  - ensure supplements  Nutrition consult  Cancer associated pain  Assessment & Plan  - cont home oxycodone and morphine PO    Seizure disorder (HCC)  Assessment & Plan  Continue Keppra 500 mg b i d     Gastro-esophageal reflux disease without esophagitis  Assessment & Plan  - cont home ranitidine and PPI            VTE PROPHYLAXIS:  + SCDs, lovenox    CODE STATUS: FULL    Anticipated Length of Stay:  Patient will be admitted on an Observation basis with an anticipated length of stay of  2 midnights  Justification for Hospital Stay:  Intractable nausea vomiting, probable aspiration pneumonia      CHIEF COMPLAINT   cough, nausea, vomiting    HISTORY OF PRESENT ILLNESS  Opal Kerr is a 27-year-old female with metastatic esophageal cancer undergoing palliative chemotherapy with FOLFox  She presents with intractable nausea and vomiting which she has been having over last 24-48 hours  She reports vomiting mucus material   She denies any hematemesis  She thinks she has vomited about 25 times in the last 24 hours  Appetite has been extremely poor  She also reports congested cough  She denies any fevers or chills  She denies any shortness of breath  No chest pain  No abdominal pain  Denies any dysphagia  Denies any projectile vomiting  Denies any prior stent placement in her esophagus  In the ED, she continued to have nausea and vomiting symptoms even with IV antiemetic therapy  Medicine was consulted for admission and management given concern of intractable nausea/vomiting symptoms  During my evaluation, patient's cough sounded significantly congested  Chest x-ray obtained suggests right-sided pneumonia concerning for aspiration  Unasyn was ordered for aspiration pneumonia coverage    Of note, patient has been receiving Keflex as an outpatient for recent chest wall cellulitis diagnosis which she is supposed to complete on June 28th  REVIEW OF SYSTEMS  A comprehensive 10 point review system conducted all negative except as mentioned in HPI       PMH/PSH    Past Medical History:   Diagnosis Date    Breast cancer ([facility] ) 2001    Esophageal cancer ([facility] )     History of chemotherapy 11/01/2001    History of radiation therapy 01/01/2002    Seizures ([facility] )     11/2014       Past Surgical History:   Procedure Laterality Date    BREAST LUMPECTOMY Right 10/01/2001    malignant    BREAST SURGERY      COLONOSCOPY  7/2017    Clear    CT NEEDLE BIOPSY LUNG  6/11/2019    ESOPHAGECTOMY      ESOPHAGECTOMY      IR PORT PLACEMENT  6/21/2019    WA ESOPHAGOGASTRODUODENOSCOPY TRANSORAL DIAGNOSTIC N/A 4/23/2019    Procedure: ESOPHAGOGASTRODUODENOSCOPY (EGD); Surgeon: Mallory Liriano MD;  Location: [facility];   Service: Gastroenterology    UPPER GASTROINTESTINAL ENDOSCOPY  7/2017       ALLERGIES  No Known Allergies    HOME MEDICATIONS  Current Facility-Administered Medications on File Prior to Encounter   Medication    [COMPLETED] ondansetron (ZOFRAN) 8 mg in sodium chloride 0 9 % 50 mL IVPB    [COMPLETED] sodium chloride 0 9 % bolus 1,000 mL    [MAR Hold] sodium chloride 0 9 % infusion     Current Outpatient Medications on File Prior to Encounter   Medication Sig    gabapentin (NEURONTIN) 300 mg capsule Take 600 mg by mouth 2 (two) times a day     levETIRAcetam (KEPPRA) 100 mg/mL oral solution Take 5 mL (500 mg total) by mouth 2 (two) times a day    morphine (MS CONTIN) 30 mg 12 hr tablet Take 1 tablet (30 mg total) by mouth every 12 (twelve) hours for 15 daysMax Daily Amount: 60 mg    ondansetron (ZOFRAN) 4 mg tablet Take 1 tablet (4 mg total) by mouth every 8 (eight) hours as needed for nausea or vomiting    oxyCODONE (ROXICODONE) 10 MG TABS Take 1 tablet (10 mg total) by mouth every 4 (four) hours as needed for moderate pain for up to 10 daysMax Daily Amount: 60 mg    pantoprazole (PROTONIX) 40 mg tablet Take 1 tablet (40 mg total) by mouth daily    ranitidine (ZANTAC) 150 mg tablet Take 150 mg by mouth daily at bedtime     senna (SENOKOT) 8 6 mg Take 3 tablets (25 8 mg total) by mouth daily at bedtime    al mag oxide-diphenhydramine-lidocaine viscous (MAGIC MOUTHWASH) 1:1:1 suspension Swish and swallow 10 mL every 6 (six) hours as needed for mouth pain or discomfort for up to 30 days    [] cephalexin (KEFLEX) 500 mg capsule Take 1 capsule (500 mg total) by mouth 4 (four) times a day for 5 days    nicotine (NICODERM CQ) 14 mg/24hr TD 24 hr patch Place 1 patch on the skin daily    [] saccharomyces boulardii (FLORASTOR) 250 mg capsule Take 1 capsule (250 mg total) by mouth 2 (two) times a day for 5 days         SOCIAL HISTORY     Marital Status: /Civil Union   Substance Use History:   Social History     Substance and Sexual Activity   Alcohol Use Yes    Alcohol/week: 2 0 standard drinks    Types: 15 Standard drinks or equivalent per week    Frequency: 4 or more times a week    Drinks per session: 3 or 4    Binge frequency: Less than monthly    Comment: pt states she has not had a drink since before she went to the hospital     Social History     Tobacco Use   Smoking Status Former Smoker    Packs/day: 0 50    Years: 50 00    Pack years: 25 00    Last attempt to quit: 2019    Years since quittin 0   Smokeless Tobacco Never Used   Tobacco Comment    pt states that she had her last cigarette 19     Social History     Substance and Sexual Activity   Drug Use Never       FAMILY HISTORY  Noncontributory current HPI    OBJECTIVE    Vitals:   Blood Pressure: 139/80 (19)  Pulse: 76 (19)  Temperature: 98 4 °F (36 9 °C) (19)  Temp Source: Oral (19)  Respirations: 20 (19)  Weight - Scale: 44 9 kg (98 lb 15 8 oz) (06/29/19 0020)  SpO2: 96 % (06/29/19 0030)    GENERAL: AAO x 3  NAD  Chronically ill-appearing  Cachectic  HEENT: atraumatic, normocephalic  PERLAA  EOMI  NECK- supple, no JVD, no lymphadenopathy, no thryomegaly  CHEST:  Coarse breath sounds, right side or rhonchi  CVS: S1, S2   Regular rate and rhythm, no murmurs, rub or gallops  ABDOMEN:  Soft, nontender, nondistended, normoactive bowel sounds  NEUROLOGICAL: CN II -XI grossly intact  No focal motor or sensory deficits  EXTREMITIES: No cyanosis/clubbing or edema  LAB DATA    Results Reviewed     Procedure Component Value Units Date/Time    CBC and differential [[id number]]  (Abnormal) Collected:  06/28/19 1917    Lab Status:  Final result Specimen:  Blood from Arm, Left Updated:  06/28/19 2012     WBC 15 21 Thousand/uL      RBC 3 78 Million/uL      Hemoglobin 12 1 g/dL      Hematocrit 36 2 %      MCV 96 fL      MCH 32 0 pg      MCHC 33 4 g/dL      RDW 12 0 %      MPV 8 2 fL      Platelets 009 Thousands/uL      nRBC 0 /100 WBCs     Lactic acid, plasma [[id number]]  (Normal) Collected:  06/28/19 1917    Lab Status:  Final result Specimen:  Blood from Arm, Left Updated:  06/28/19 1950     LACTIC ACID 1 2 mmol/L     Narrative:       Result may be elevated if tourniquet was used during collection      Basic metabolic panel [[id number]]  (Abnormal) Collected:  06/28/19 1917    Lab Status:  Final result Specimen:  Blood from Arm, Left Updated:  06/28/19 1945     Sodium 138 mmol/L      Potassium 3 4 mmol/L      Chloride 98 mmol/L      CO2 28 mmol/L      ANION GAP 12 mmol/L      BUN 10 mg/dL      Creatinine 0 72 mg/dL      Glucose 121 mg/dL      Calcium 10 1 mg/dL      eGFR 90 ml/min/1 73sq m     Narrative:       [place] guidelines for Chronic Kidney Disease (CKD):     Stage 1 with normal or high GFR (GFR > 90 mL/min/1 73 square meters)    Stage 2 Mild CKD (GFR = 60-89 mL/min/1 73 square meters)    Stage 3A Moderate CKD (GFR = 45-59 mL/min/1 73 square meters)    Stage 3B Moderate CKD (GFR = 30-44 mL/min/1 73 square meters)    Stage 4 Severe CKD (GFR = 15-29 mL/min/1 73 square meters)    Stage 5 End Stage CKD (GFR <15 mL/min/1 73 square meters)  Note: GFR calculation is accurate only with a steady state creatinine    Magnesium [[id number]]  (Abnormal) Collected:  06/28/19 1917    Lab Status:  Final result Specimen:  Blood from Arm, Left Updated:  06/28/19 1945     Magnesium 1 4 mg/dL     Hepatic function panel [[id number]]  (Abnormal) Collected:  06/28/19 1917    Lab Status:  Final result Specimen:  Blood from Arm, Left Updated:  06/28/19 1945     Total Bilirubin 0 40 mg/dL      Bilirubin, Direct 0 10 mg/dL      Alkaline Phosphatase 84 U/L      AST 14 U/L      ALT 8 U/L      Total Protein 7 4 g/dL      Albumin 2 7 g/dL     Urine Microscopic [[id number]]  (Abnormal) Collected:  06/28/19 1902    Lab Status:  Final result Specimen:  Urine, Clean Catch Updated:  06/28/19 1916     RBC, UA None Seen /hpf      WBC, UA 1-2 /hpf      Epithelial Cells Occasional /hpf      Bacteria, UA Occasional /hpf      MUCUS THREADS Occasional    UA w Reflex to Microscopic w Reflex to Culture [[id number]]  (Abnormal) Collected:  06/28/19 1902    Lab Status:  Final result Specimen:  Urine, Clean Catch Updated:  06/28/19 1909     Color, UA Yellow     Clarity, UA Clear     Specific [place], UA 1 020     pH, UA 6 5     Leukocytes, UA Negative     Nitrite, UA Negative     Protein, UA Trace mg/dl      Glucose, UA Negative mg/dl      Ketones, UA Negative mg/dl      Urobilinogen, UA 0 2 E U /dl      Bilirubin, UA Negative     Blood, UA Negative          EKG, Pathology, and Other Studies Reviewed on Admission  Total time spent in the process of admission, completion of records, counseling, coordination of care, discussion with patient/family was approximately 35 minutes      Aleksey Belle MD  HOSPITALIST SERVICES  6/29/2019      PLEASE NOTE:  This encounter was completed utilizing the [employer] Direct Speech Voice Recognition Software  Grammatical errors, random word insertions, pronoun errors and incomplete sentences are occasional consequences of the system due to software limitations, ambient noise and hardware issues  These may be missed by proof reading prior to affixing electronic signature  Any questions or concerns about the content, text or information contained within the body of this dictation should be directly addressed to the physician for clarification  Please do not hesitate to call me directly if you have any any questions or concerns

## 2019-06-29 NOTE — ASSESSMENT & PLAN NOTE
Malnutrition Findings:       Albumin 2 7    BMI Findings: Body mass index is 18 33 kg/m²  - ensure supplements  Nutrition consult

## 2019-06-29 NOTE — PROGRESS NOTES
Called to room by PCA  Pt O2 sats 81 at this time  Pt also having expiratory wheezes and labored breathing  Skin also appears mottled  Dr Linda Breen requested at the bedside to evaluate the patient  Dr Linda Breen stressed the importance of IV antibiotic therapy to treat the aspiration pneumonia  Pt agreed to treatment plan

## 2019-06-29 NOTE — ED PROVIDER NOTES
History  Chief Complaint   Patient presents with    Vomiting     pt finished 3rd round of chemo today, unable to keep anything down  was given fluids and nausea medicatiojns this morning but feels that it was not enough  pt vomiting during triage     HPI    Prior to Admission Medications   Prescriptions Last Dose Informant Patient Reported? Taking?    al mag oxide-diphenhydramine-lidocaine viscous (MAGIC MOUTHWASH) 1:1:1 suspension   No No   Sig: Swish and swallow 10 mL every 6 (six) hours as needed for mouth pain or discomfort for up to 30 days   cephalexin (KEFLEX) 500 mg capsule   No No   Sig: Take 1 capsule (500 mg total) by mouth 4 (four) times a day for 5 days   fluorouracil 3,310 mg in CADD infusion pump   No No   Sig: Infuse 3,310 mg (1,200 mg/m2/day x 1 38 m2 (Treatment plan recorded BSA)) into a venous catheter over 46 hours for 2 days   gabapentin (NEURONTIN) 300 mg capsule  Self Yes No   Sig: Take 600 mg by mouth 2 (two) times a day    levETIRAcetam (KEPPRA) 100 mg/mL oral solution  Self No No   Sig: Take 5 mL (500 mg total) by mouth 2 (two) times a day   morphine (MS CONTIN) 30 mg 12 hr tablet   No No   Sig: Take 1 tablet (30 mg total) by mouth every 12 (twelve) hours for 15 daysMax Daily Amount: 60 mg   nicotine (NICODERM CQ) 14 mg/24hr TD 24 hr patch   No No   Sig: Place 1 patch on the skin daily   ondansetron (ZOFRAN) 4 mg tablet   No No   Sig: Take 1 tablet (4 mg total) by mouth every 8 (eight) hours as needed for nausea or vomiting   oxyCODONE (ROXICODONE) 10 MG TABS   No No   Sig: Take 1 tablet (10 mg total) by mouth every 4 (four) hours as needed for moderate pain for up to 10 daysMax Daily Amount: 60 mg   pantoprazole (PROTONIX) 40 mg tablet  Self No No   Sig: Take 1 tablet (40 mg total) by mouth daily   ranitidine (ZANTAC) 150 mg tablet  Self Yes No   Sig: Take 150 mg by mouth daily at bedtime    saccharomyces boulardii (FLORASTOR) 250 mg capsule   No No   Sig: Take 1 capsule (250 mg total) by mouth 2 (two) times a day for 5 days   senna (SENOKOT) 8 6 mg   No No   Sig: Take 3 tablets (25 8 mg total) by mouth daily at bedtime      Facility-Administered Medications: None       Past Medical History:   Diagnosis Date    Breast cancer ([facility] )     Esophageal cancer ([facility] )     History of chemotherapy 2001    History of radiation therapy 2002    Seizures ([facility] )     2014       Past Surgical History:   Procedure Laterality Date    BREAST LUMPECTOMY Right 10/01/2001    malignant    BREAST SURGERY      COLONOSCOPY  2017    Clear    CT NEEDLE BIOPSY LUNG  2019    ESOPHAGECTOMY      ESOPHAGECTOMY      IR PORT PLACEMENT  2019    CT ESOPHAGOGASTRODUODENOSCOPY TRANSORAL DIAGNOSTIC N/A 2019    Procedure: ESOPHAGOGASTRODUODENOSCOPY (EGD); Surgeon: Axel Haynes MD;  Location: [facility]; Service: Gastroenterology    UPPER GASTROINTESTINAL ENDOSCOPY  2017       Family History   Problem Relation Age of Onset    No Known Problems Mother     No Known Problems Father     No Known Problems Sister     No Known Problems Daughter     No Known Problems Maternal Grandmother     No Known Problems Maternal Grandfather     Breast cancer Paternal Grandmother 32    No Known Problems Maternal Aunt     No Known Problems Cousin     No Known Problems Cousin     No Known Problems Cousin      I have reviewed and agree with the history as documented  Social History     Tobacco Use    Smoking status: Former Smoker     Packs/day: 0 50     Years: 50 00     Pack years: 25 00     Last attempt to quit: 2019     Years since quittin 0    Smokeless tobacco: Never Used    Tobacco comment: pt states that she had her last cigarette 19   Substance Use Topics    Alcohol use:  Yes     Alcohol/week: 2 0 standard drinks     Types: 15 Standard drinks or equivalent per week     Frequency: 4 or more times a week     Drinks per session: 3 or 4     Binge frequency: Less than monthly Comment: pt states she has not had a drink since before she went to the hospital    Drug use: Never        Review of Systems    Physical Exam  Physical Exam   Constitutional: She is oriented to person, place, and time  She appears well-developed and well-nourished  She appears ill (chronically)  No distress  frail   HENT:   Head: Normocephalic and atraumatic  Mouth/Throat: Oropharynx is clear and moist  Mucous membranes are dry  Eyes: Pupils are equal, round, and reactive to light  Conjunctivae are normal    Neck: Normal range of motion  No tracheal deviation present  Cardiovascular: Regular rhythm, normal heart sounds and intact distal pulses  Tachycardia present  Pulmonary/Chest: Effort normal and breath sounds normal  No respiratory distress  Occasional coughing   Abdominal: Soft  Bowel sounds are normal  She exhibits no distension  There is generalized tenderness (moderate)  Neurological: She is alert and oriented to person, place, and time  She has normal strength  GCS eye subscore is 4  GCS verbal subscore is 5  GCS motor subscore is 6  Skin: Skin is warm and dry  Psychiatric: She has a normal mood and affect  Her behavior is normal    Nursing note and vitals reviewed        Vital Signs  ED Triage Vitals   Temperature Pulse Respirations Blood Pressure SpO2   06/28/19 1818 06/28/19 1818 06/28/19 1818 06/28/19 1818 06/28/19 1818   98 4 °F (36 9 °C) 89 18 140/82 90 %      Temp Source Heart Rate Source Patient Position - Orthostatic VS BP Location FiO2 (%)   06/28/19 1818 06/28/19 1940 06/28/19 1818 06/28/19 1818 --   Oral Monitor Lying Left arm       Pain Score       06/28/19 2050       9           Vitals:    06/28/19 2030 06/28/19 2200 06/28/19 2300 06/29/19 0000   BP: 130/74 123/74 150/96 124/71   Pulse: 75 78 88 78   Patient Position - Orthostatic VS:             Visual Acuity      ED Medications  Medications   LORazepam (ATIVAN) 2 mg/mL injection 1 mg (has no administration in time range)   sodium chloride 0 9 % bolus 1,000 mL (1,000 mL Intravenous New Bag 6/29/19 0010)   morphine (PF) 4 mg/mL injection 4 mg (has no administration in time range)   ondansetron (ZOFRAN) injection 4 mg (4 mg Intravenous Given 6/28/19 1949)   sodium chloride 0 9 % bolus 1,000 mL (0 mL Intravenous Stopped 6/28/19 2258)   ondansetron (ZOFRAN-ODT) dispersible tablet 4 mg (4 mg Oral Given 6/28/19 1906)   morphine (PF) 4 mg/mL injection 4 mg (4 mg Intravenous Given 6/28/19 2050)   pantoprazole (PROTONIX) injection 40 mg (40 mg Intravenous Given 6/28/19 2052)   iohexol (OMNIPAQUE) 350 MG/ML injection (MULTI-DOSE) 100 mL (100 mL Intravenous Given 6/28/19 2116)   metoclopramide (REGLAN) injection 10 mg (10 mg Intravenous Given 6/28/19 2259)       Diagnostic Studies  Results Reviewed     Procedure Component Value Units Date/Time    CBC and differential [[id number]]  (Abnormal) Collected:  06/28/19 1917    Lab Status:  Final result Specimen:  Blood from Arm, Left Updated:  06/28/19 2012     WBC 15 21 Thousand/uL      RBC 3 78 Million/uL      Hemoglobin 12 1 g/dL      Hematocrit 36 2 %      MCV 96 fL      MCH 32 0 pg      MCHC 33 4 g/dL      RDW 12 0 %      MPV 8 2 fL      Platelets 630 Thousands/uL      nRBC 0 /100 WBCs     Lactic acid, plasma [[id number]]  (Normal) Collected:  06/28/19 1917    Lab Status:  Final result Specimen:  Blood from Arm, Left Updated:  06/28/19 1950     LACTIC ACID 1 2 mmol/L     Narrative:       Result may be elevated if tourniquet was used during collection      Basic metabolic panel [[id number]]  (Abnormal) Collected:  06/28/19 1917    Lab Status:  Final result Specimen:  Blood from Arm, Left Updated:  06/28/19 1945     Sodium 138 mmol/L      Potassium 3 4 mmol/L      Chloride 98 mmol/L      CO2 28 mmol/L      ANION GAP 12 mmol/L      BUN 10 mg/dL      Creatinine 0 72 mg/dL      Glucose 121 mg/dL      Calcium 10 1 mg/dL      eGFR 90 ml/min/1 73sq m     Narrative:       National Kidney Disease Foundation guidelines for Chronic Kidney Disease (CKD):     Stage 1 with normal or high GFR (GFR > 90 mL/min/1 73 square meters)    Stage 2 Mild CKD (GFR = 60-89 mL/min/1 73 square meters)    Stage 3A Moderate CKD (GFR = 45-59 mL/min/1 73 square meters)    Stage 3B Moderate CKD (GFR = 30-44 mL/min/1 73 square meters)    Stage 4 Severe CKD (GFR = 15-29 mL/min/1 73 square meters)    Stage 5 End Stage CKD (GFR <15 mL/min/1 73 square meters)  Note: GFR calculation is accurate only with a steady state creatinine    Magnesium [[id number]]  (Abnormal) Collected:  06/28/19 1917    Lab Status:  Final result Specimen:  Blood from Arm, Left Updated:  06/28/19 1945     Magnesium 1 4 mg/dL     Hepatic function panel [[id number]]  (Abnormal) Collected:  06/28/19 1917    Lab Status:  Final result Specimen:  Blood from Arm, Left Updated:  06/28/19 1945     Total Bilirubin 0 40 mg/dL      Bilirubin, Direct 0 10 mg/dL      Alkaline Phosphatase 84 U/L      AST 14 U/L      ALT 8 U/L      Total Protein 7 4 g/dL      Albumin 2 7 g/dL     Urine Microscopic [[id number]]  (Abnormal) Collected:  06/28/19 1902    Lab Status:  Final result Specimen:  Urine, Clean Catch Updated:  06/28/19 1916     RBC, UA None Seen /hpf      WBC, UA 1-2 /hpf      Epithelial Cells Occasional /hpf      Bacteria, UA Occasional /hpf      MUCUS THREADS Occasional    UA w Reflex to Microscopic w Reflex to Culture [[id number]]  (Abnormal) Collected:  06/28/19 1902    Lab Status:  Final result Specimen:  Urine, Clean Catch Updated:  06/28/19 1909     Color, UA Yellow     Clarity, UA Clear     Specific [place], UA 1 020     pH, UA 6 5     Leukocytes, UA Negative     Nitrite, UA Negative     Protein, UA Trace mg/dl      Glucose, UA Negative mg/dl      Ketones, UA Negative mg/dl      Urobilinogen, UA 0 2 E U /dl      Bilirubin, UA Negative     Blood, UA Negative                 CT abdomen pelvis with contrast   Final Result by Bozena Brown MD (06/28 2136)      1  Redemonstrated pleural-based masses with stable erosive change within the left lateral 9th and 10th ribs compatible with metastasis  2   Redemonstrated bibasilar scarring and bronchiectatic changes with increased patchy opacity within the right lower and middle lobes which may represent superimposed pneumonia versus aspiration  3   Diffuse thickening of the colon compatible with nonspecific colitis  4   Stable 1 cm indeterminate left adrenal nodule  Workstation performed: [id number]         XR chest 2 views    (Results Pending)              Procedures  Procedures       ED Course                               MDM  Number of Diagnoses or Management Options  Abdominal pain: new and requires workup  Colitis: new and requires workup  Esophageal cancer [facility]): new and requires workup  Intractable nausea and vomiting: new and requires workup  Diagnosis management comments: This is a 81-year-old female who presents here today with nausea and vomiting  She is currently undergoing chemotherapy for metastatic esophageal cancer  Today was day three of three of her cycle  She began having symptoms yesterday afternoon around 1500  She did take a dose of her Zofran yesterday, was able to move all sleep  She did have some nausea this morning but was able to tolerate sips of fluids  Symptoms worsened while at her infusion today  She was advised to be seen in the emergency department at that time, but she declined  She was given a dose of IV Zofran and fluids, and was discharged home  She states she took a nap when she got home because she did feel slightly better, however she then had worsening of symptoms when she woke up  She has not taken any additional Zofran  She states she is persistently vomiting and unable to tolerate fluids around she denies any fevers  She denies any diarrhea  She has chronic constipation and did move her bowels today    She endorses chronic abdominal pain, but states it is worse recently  She denies any changes in her urine  She has had nausea secondary to chemotherapy in the past, but never to this extent  She denies any known sick contacts or bad food exposures  She does endorse some chronic cough at baseline which she says is not acutely worsened or changed  She denies any nasal congestion, other URI symptoms  ROS: Otherwise negative, unless stated as above  She is chronically ill-appearing  She has moderate diffuse abdominal tenderness  She is mildly tachycardic at around 100  She has strenuous membranes  Her exam is otherwise unremarkable  This could be secondary to her chemotherapy, secondary to underlying infection, acute process such as pancreatitis, colitis, cholelithiasis or cholecystitis, UTI, unrelated viral GI illness  She is not currently having any respiratory symptoms to suggest pneumonia  We will check lab work and CT scan to evaluate, and treat her symptoms  She does have leukocytosis today of 15 2  White it was normal when checked on 06/23, however the patient has been as high as 13 7 at within the past month  She does have thrombocytosis, but is at her baseline  The urine does not show signs of UTI  Her creatinine today is 0 72, which is up from a 0 44 when checked five days ago, with a baseline of around 0 6  Bree Barrientos She does have mild associated hypomagnesemia, our and her lactic acid is normal   CT scan shows mild diffuse colitis, patient but no other acute abnormalities  The colitis could be secondary to her chemotherapeutic agents, and contributing to her pain and persistent vomiting  She is not having diarrhea or changes in her bowels suggest infectious for which she will need antibiotics to treat this  She does not have any improvement with medications here thus far, and does continue to vomit  For this reason, we will admit her for further management  I did update the patient on findings and plan of care, and she expresses understanding  She does express concern that she is supposed to take her evening dose of Keppra, so we will give her an IV dose of this at this time  Prior to the patient being transferred to the floor, she was seen by [facility], and per their request a chest X-ray was ordered, given worsening coughing, and rhonchi on their exam         Amount and/or Complexity of Data Reviewed  Clinical lab tests: ordered and reviewed  Tests in the radiology section of CPT®: reviewed and ordered  Decide to obtain previous medical records or to obtain history from someone other than the patient: yes  Review and summarize past medical records: yes  Discuss the patient with other providers: yes  Independent visualization of images, tracings, or specimens: yes        Disposition  Final diagnoses:   Intractable nausea and vomiting   Abdominal pain   Colitis   Esophageal cancer ([facility] [address] )     Time reflects when diagnosis was documented in both MDM as applicable and the Disposition within this note     Time User Action Codes Description Comment    6/28/2019 11:32 PM Shanelle Alvares Add [R11 2] Intractable nausea and vomiting     6/28/2019 11:32 PM Shanelle Alvares Add [R10 9] Abdominal pain     6/28/2019 11:32 PM Shanelle Alvares Add [K52 9] Colitis     6/28/2019 11:33 PM Shanelle Alvares Add [C15 9] Esophageal cancer [facility])       ED Disposition     ED Disposition Condition Date/Time Comment    Admit Fair Fri Jun 28, 2019 11:37 PM Case was discussed with Dr Jimmy Lange and the patient's admission status was agreed to be Admission Status: observation status to the service of Dr Jimmy Lange  Follow-up Information    None         Patient's Medications   Discharge Prescriptions    No medications on file     No discharge procedures on file      ED Provider  Electronically Signed by           Sameer Sidhu MD  06/29/19 0024       Sameer Sidhu MD  06/29/19 7646

## 2019-06-29 NOTE — ASSESSMENT & PLAN NOTE
- coarse breath sounds and CXR suggesting R sided PNA- likely aspiration related from multiple vomiting episodes over the last 24-48 hrs  - given that patient receiving active chemotherapy, will treat for aspiration pneumonia with antibiotics  Plan to institute Unasyn IV  Will hold p o  Keflex which patient was receiving until June 28th for chest wall cellulitis  Mucolytic therapy ordered  Chest PT     - IVF with NS at 100cc/hr

## 2019-06-29 NOTE — PROGRESS NOTES
Progress Note - Dustin Reis 1955, 60 y o  female MRN: [id number]    Unit/Bed#: -01 Encounter: [id number]    Primary Care Provider: Luz Wilson MD   Date and time admitted to hospital: 6/28/2019  6:09 PM        Acute respiratory failure with hypoxia [facility])  Assessment & Plan  Due to multifocal pneumonia  Patient requires up to 4 L of oxygen at this time and saturates in low 90s or high 80s  Just started on broad-spectrum antibiotics this morning  Expect improvement with the antibiotic therapy  Supportive management at this time  Cancer associated pain  Assessment & Plan  - Cont home oxycodone and morphine PO  Will provide with IV morphine for breakthrough pain  Seizure disorder ([facility] [address] )  Assessment & Plan  Continue Keppra 500 mg b i d     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 [facility])  Assessment & Plan  - 7/2017: Otf  showed mid esophagus squamous cell carcinoma, 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  - 8/2017:  CT scan showed a small 5 mm left lower lung nodule, be observed  - 8-10/2017:  Finished concurrent chemo radiation with Erica Nyhan Taxol  - 3/2018:  Repeat EGD showed local recurrence, status post esophagectomy, complicated by esophagus week age, status post stent placement, final surgery showed negative margin  - 8/2018:  Surveillance imaging showed positive mediastinum lymphadenopathy, concern for metastatic disease, status post SBRT   - 11/2018:  Restaging CT chest showed left pleural based mass, concern for metastatic disease, PET-CT was denied by insurance company, patient then lost follow-up and relocated to [place]  - 4/23/2019: Otf , showed negative for significant finding, biopsies negative for cancer   - 6/2019:  PET-CT showed diffuse metastatic lesion, including bilateral lung pleural based lesion, diffuse lymphadenopathy, including mediastinum     - t palliative chemotherapy, with FOLFOX standard dose    - plan to admit for supportive care     - antiemetics  - pain control  - IVF with NS at 75cc/hr  - plan to consult oncology for additional management recommendations       Gastro-esophageal reflux disease without esophagitis  Assessment & Plan  - cont home ranitidine and PPI    * Multifocal pneumonia  Assessment & Plan  -review chest x-ray  It reveals multifocal pneumonia  - Significantly decreased coarse breath sounds - likely aspiration related from multiple vomiting episodes over the last 24-48 hrs  - given that patient is immunocompromised due to the active chemotherapy, will treat with broad-spectrum antibiotics:  Cefepime, vancomycin and Flagyl      - IVF with NS at 75cc/hr  Patient does not appear septic at this time, but her oxygenation level is decreased  VTE Pharmacologic Prophylaxis:   Pharmacologic:  Lovenox  Mechanical VTE Prophylaxis in Place: Yes    Patient Centered Rounds: I have performed bedside rounds with nursing staff today  Discussions with Specialists or Other Care Team Provider:  Nurse    Education and Discussions with Family / Patient:  Patient    Time Spent for Care: 20 minutes  More than 50% of total time spent on counseling and coordination of care as described above  Current Length of Stay: 0 day(s)    Current Patient Status: Inpatient   Certification Statement: The patient will continue to require additional inpatient hospital stay due to Multifocal pneumonia and hypoxic respiratory failure    Discharge Plan:  48-72 hours    Code Status: Level 3 - DNAR and DNI      Subjective:   Patient was seen and examined  She complains of difficulty breathing with oxygen on "because it is drying her nose and it feels burning"  She also complains of pain and asks for morphine      Objective:     Vitals:   Temp (24hrs), Av 2 °F (36 8 °C), Min:98 °F (36 7 °C), Max:98 4 °F (36 9 °C)    Temp:  [98 °F (36 7 °C)-98 4 °F (36 9 °C)] 98 °F (36 7 °C)  HR:  [75-92] 92  Resp:  [18-26] 26  BP: (123-150)/(71-96) 139/74  SpO2:  [84 %-96 %] 88 %  Body mass index is 18 1 kg/m²  Input and Output Summary (last 24 hours): Intake/Output Summary (Last 24 hours) at 6/29/2019 1532  Last data filed at 6/29/2019 1241  Gross per 24 hour   Intake 886 67 ml   Output --   Net 886 67 ml       Physical Exam:     Physical Exam   Constitutional: She appears ill    malnourished   HENT:   Head: Normocephalic and atraumatic  Eyes: Pupils are equal, round, and reactive to light  Neck: Normal range of motion  Cardiovascular: Normal rate and regular rhythm  Pulmonary/Chest: Effort normal  Tachypnea noted  She has decreased breath sounds  Abdominal: Soft  She exhibits no distension  Musculoskeletal: She exhibits no edema  Neurological: She is alert  She has normal strength  No cranial nerve deficit  Skin: Skin is warm  No erythema  Psychiatric: She has a normal mood and affect  Her behavior is normal          Additional Data:     Labs:    Results from last 7 days   Lab Units 06/29/19  0642   WBC Thousand/uL 16 49*   HEMOGLOBIN g/dL 11 8   HEMATOCRIT % 36 3   PLATELETS Thousands/uL 373   BANDS PCT % 4   LYMPHO PCT % 6*   MONO PCT % 2*   EOS PCT % 0     Results from last 7 days   Lab Units 06/29/19  0642 06/28/19  1917   SODIUM mmol/L 138 138   POTASSIUM mmol/L 2 7* 3 4*   CHLORIDE mmol/L 101 98*   CO2 mmol/L 28 28   BUN mg/dL 8 10   CREATININE mg/dL 0 60 0 72   ANION GAP mmol/L 9 12   CALCIUM mg/dL 9 6 10 1   ALBUMIN g/dL  --  2 7*   TOTAL BILIRUBIN mg/dL  --  0 40   ALK PHOS U/L  --  84   ALT U/L  --  8*   AST U/L  --  14   GLUCOSE RANDOM mg/dL 103 121                 Results from last 7 days   Lab Units 06/28/19  1917   LACTIC ACID mmol/L 1 2           * I Have Reviewed All Lab Data Listed Above  * Additional Pertinent Lab Tests Reviewed:  All Labs Within Last 24 Hours Reviewed    Imaging:  Chest x-ray  Recent Cultures (last 7 days):           Last 24 Hours Medication List:     Current Facility-Administered Medications:  acetaminophen 650 mg Oral Q6H PRN Favian Foote MD    al mag oxide-diphenhydramine-lidocaine viscous 10 mL Swish & Swallow Q6H PRN Favian Foote MD    albuterol 2 5 mg Nebulization Q6H PRN Enid Conway MD    cefepime 2,000 mg Intravenous Q12H Enid Conway MD Last Rate: 2,000 mg (06/29/19 1130)   enoxaparin 40 mg Subcutaneous Daily Favian Foote MD    famotidine 20 mg Oral Daily Favian Foote MD    gabapentin 600 mg Oral BID Beni Krishnan MD    guaiFENesin 600 mg Oral Q12H Nigelpat Alaniz MD    ipratropium 0 5 mg Nebulization TID Enid Conway MD    levalbuterol 1 25 mg Nebulization TID Enid Conway MD    levETIRAcetam 500 mg Oral BID Favian Foote MD    magnesium sulfate 2 g Intravenous Once Favian Foote MD    metroNIDAZOLE 500 mg Intravenous Q8H Enid Conway MD Last Rate: 500 mg (06/29/19 1300)   morphine 30 mg Oral Q12H [facility] Favian Foote MD    morphine injection 4 mg Intravenous Q6H PRN Enid Conway MD    ondansetron 4 mg Oral Q8H PRN Favian Foote MD    oxyCODONE 10 mg Oral Q4H PRN Favian Foote MD    pantoprazole 40 mg Oral Daily Favian Foote MD    potassium chloride 40 mEq Intravenous Once Enid Conway MD    promethazine 25 mg Intramuscular Q6H PRN Favian Foote MD    saccharomyces boulardii 250 mg Oral BID Favian Foote MD    senna 3 tablet Oral HS Favian Foote MD    sodium chloride 75 mL/hr Intravenous Continuous Enid Conway MD Last Rate: 75 mL/hr (06/29/19 1241)   vancomycin 17 5 mg/kg Intravenous Q12H Enid Conway MD         Today, Patient Was Seen By: Enid Conway MD    ** Please Note: Dictation voice to text software may have been used in the creation of this document   **

## 2019-06-29 NOTE — MALNUTRITION/BMI
This medical record reflects one or more clinical indicators suggestive of malnutrition and/or morbid obesity  Malnutrition Findings:   Malnutrition type: Chronic illness  Degree of Malnutrition: Other severe protein calorie malnutrition  Malnutrition Characteristics: Muscle loss, Fat loss, Inadequate energy(related to inadequate energy intake as evidenced by pt report of no appetite, wasted buccal pads, wasted temporalis, sunken orbital, visible clavicle, wasted interosseous, low BMI  Intervention: Magic cup supplements, Regular diet)    BMI Findings:  BMI Classifications: Underweight < 18 5     Body mass index is 18 1 kg/m²  See Nutrition note dated 6/29/19 for additional details  Completed nutrition assessment is viewable in the nutrition documentation

## 2019-06-29 NOTE — ASSESSMENT & PLAN NOTE
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  - 8/2017:  CT scan showed a small 5 mm left lower lung nodule, be observed  - 8-10/2017:  Finished concurrent chemo radiation with Eluterio Aruna Taxol  - 3/2018:  Repeat EGD showed local recurrence, status post esophagectomy, complicated by esophagus week age, status post stent placement, final surgery showed negative margin  - 8/2018:  Surveillance imaging showed positive mediastinum lymphadenopathy, concern for metastatic disease, status post SBRT   - 11/2018:  Restaging CT chest showed left pleural based mass, concern for metastatic disease, PET-CT was denied by insurance company, patient then lost follow-up and relocated to [place]  - 4/23/2019: Vicki Lovell, showed negative for significant finding, biopsies negative for cancer   - 6/2019:  PET-CT showed diffuse metastatic lesion, including bilateral lung pleural based lesion, diffuse lymphadenopathy, including mediastinum     - t palliative chemotherapy, with FOLFOX standard dose    - plan to admit for supportive care     - antiemetics  - pain control  - IVF with NS at 100cc/hr  - plan to consult oncology for additional management recommendations

## 2019-06-29 NOTE — PLAN OF CARE
Problem: Potential for Falls  Goal: Patient will remain free of falls  Description  INTERVENTIONS:  - Assess patient frequently for physical needs  -  Identify cognitive and physical deficits and behaviors that affect risk of falls    -  [place] fall precautions as indicated by assessment   - Educate patient/family on patient safety including physical limitations  - Instruct patient to call for assistance with activity based on assessment  - Modify environment to reduce risk of injury  - Consider OT/PT consult to assist with strengthening/mobility  Outcome: Progressing     Problem: PAIN - ADULT  Goal: Verbalizes/displays adequate comfort level or baseline comfort level  Description  Interventions:  - Encourage patient to monitor pain and request assistance  - Assess pain using appropriate pain scale  - Administer analgesics based on type and severity of pain and evaluate response  - Implement non-pharmacological measures as appropriate and evaluate response  - Consider cultural and social influences on pain and pain management  - Notify physician/advanced practitioner if interventions unsuccessful or patient reports new pain  Outcome: Progressing     Problem: INFECTION - ADULT  Goal: Absence or prevention of progression during hospitalization  Description  INTERVENTIONS:  - Assess and monitor for signs and symptoms of infection  - Monitor lab/diagnostic results  - Monitor all insertion sites, i e  indwelling lines, tubes, and drains  - Monitor endotracheal (as able) and nasal secretions for changes in amount and color  - [place] appropriate cooling/warming therapies per order  - Administer medications as ordered  - Instruct and encourage patient and family to use good hand hygiene technique  - Identify and instruct in appropriate isolation precautions for identified infection/condition  Outcome: Progressing     Problem: SAFETY ADULT  Goal: Patient will remain free of falls  Description  INTERVENTIONS:  - Assess patient frequently for physical needs  -  Identify cognitive and physical deficits and behaviors that affect risk of falls  -  [place] fall precautions as indicated by assessment   - Educate patient/family on patient safety including physical limitations  - Instruct patient to call for assistance with activity based on assessment  - Modify environment to reduce risk of injury  - Consider OT/PT consult to assist with strengthening/mobility  Outcome: Progressing     Problem: DISCHARGE PLANNING  Goal: Discharge to home or other facility with appropriate resources  Description  INTERVENTIONS:  - Identify barriers to discharge w/patient and caregiver  - Arrange for needed discharge resources and transportation as appropriate  - Identify discharge learning needs (meds, wound care, etc )  - Arrange for interpretive services to assist at discharge as needed  - Refer to Case Management Department for coordinating discharge planning if the patient needs post-hospital services based on physician/advanced practitioner order or complex needs related to functional status, cognitive ability, or social support system  Outcome: Progressing     Problem: Knowledge Deficit  Goal: Patient/family/caregiver demonstrates understanding of disease process, treatment plan, medications, and discharge instructions  Description  Complete learning assessment and assess knowledge base    Interventions:  - Provide teaching at level of understanding  - Provide teaching via preferred learning methods  Outcome: Progressing     Problem: RESPIRATORY - ADULT  Goal: Achieves optimal ventilation and oxygenation  Description  INTERVENTIONS:  - Assess for changes in respiratory status  - Assess for changes in mentation and behavior  - Position to facilitate oxygenation and minimize respiratory effort  - Oxygen administration by appropriate delivery method based on oxygen saturation (per order) or ABGs  - Initiate smoking cessation education as indicated  - Encourage broncho-pulmonary hygiene including cough, deep breathe, Incentive Spirometry  - Assess the need for suctioning and aspirate as needed  - Assess and instruct to report SOB or any respiratory difficulty  - Respiratory Therapy support as indicated  Outcome: Progressing

## 2019-06-29 NOTE — ASSESSMENT & PLAN NOTE
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  - 8/2017:  CT scan showed a small 5 mm left lower lung nodule, be observed  - 8-10/2017:  Finished concurrent chemo radiation with Omie Darrel Taxol  - 3/2018:  Repeat EGD showed local recurrence, status post esophagectomy, complicated by esophagus week age, status post stent placement, final surgery showed negative margin  - 8/2018:  Surveillance imaging showed positive mediastinum lymphadenopathy, concern for metastatic disease, status post SBRT   - 11/2018:  Restaging CT chest showed left pleural based mass, concern for metastatic disease, PET-CT was denied by insurance company, patient then lost follow-up and relocated to [place]  - 4/23/2019: Cuong Flick, showed negative for significant finding, biopsies negative for cancer   - 6/2019:  PET-CT showed diffuse metastatic lesion, including bilateral lung pleural based lesion, diffuse lymphadenopathy, including mediastinum     - t palliative chemotherapy, with FOLFOX standard dose    - plan to admit for supportive care     - antiemetics  - pain control  - IVF with NS at 75cc/hr  - plan to consult oncology for additional management recommendations

## 2019-06-29 NOTE — ED NOTES
1  CC: nausea, vomiting, abdominal pain  2  Orientation status: alert and oriented x4  3  Abnormal labs, tests, vitals: WBC 15 21, magnesium 1 4, albumin 2 7  4  Important drips: sodium chloride and keppra  5  Time of last narcotics: morphine 4mg and ativan 2mg at 0011  6  IV lines, drains, tubes: 20 L AC - Limb alert left arm  7  Isolation status: standard  8  Skin check: WNL  9  Ambulation status: pt reports she is able to ambulate at home however has not been ambulatory during my care  10  Trauma: no  11   ED RN phone number: [phone] [facility], RN  06/29/19 [id number]

## 2019-06-29 NOTE — PLAN OF CARE
Problem: Potential for Falls  Goal: Patient will remain free of falls  Description  INTERVENTIONS:  - Assess patient frequently for physical needs  -  Identify cognitive and physical deficits and behaviors that affect risk of falls    -  [place] fall precautions as indicated by assessment   - Educate patient/family on patient safety including physical limitations  - Instruct patient to call for assistance with activity based on assessment  - Modify environment to reduce risk of injury  - Consider OT/PT consult to assist with strengthening/mobility  Outcome: Progressing     Problem: PAIN - ADULT  Goal: Verbalizes/displays adequate comfort level or baseline comfort level  Description  Interventions:  - Encourage patient to monitor pain and request assistance  - Assess pain using appropriate pain scale  - Administer analgesics based on type and severity of pain and evaluate response  - Implement non-pharmacological measures as appropriate and evaluate response  - Consider cultural and social influences on pain and pain management  - Notify physician/advanced practitioner if interventions unsuccessful or patient reports new pain  Outcome: Progressing     Problem: INFECTION - ADULT  Goal: Absence or prevention of progression during hospitalization  Description  INTERVENTIONS:  - Assess and monitor for signs and symptoms of infection  - Monitor lab/diagnostic results  - Monitor all insertion sites, i e  indwelling lines, tubes, and drains  - Monitor endotracheal (as able) and nasal secretions for changes in amount and color  - [place] appropriate cooling/warming therapies per order  - Administer medications as ordered  - Instruct and encourage patient and family to use good hand hygiene technique  - Identify and instruct in appropriate isolation precautions for identified infection/condition  Outcome: Progressing     Problem: SAFETY ADULT  Goal: Patient will remain free of falls  Description  INTERVENTIONS:  - Assess patient frequently for physical needs  -  Identify cognitive and physical deficits and behaviors that affect risk of falls  -  [place] fall precautions as indicated by assessment   - Educate patient/family on patient safety including physical limitations  - Instruct patient to call for assistance with activity based on assessment  - Modify environment to reduce risk of injury  - Consider OT/PT consult to assist with strengthening/mobility  Outcome: Progressing     Problem: DISCHARGE PLANNING  Goal: Discharge to home or other facility with appropriate resources  Description  INTERVENTIONS:  - Identify barriers to discharge w/patient and caregiver  - Arrange for needed discharge resources and transportation as appropriate  - Identify discharge learning needs (meds, wound care, etc )  - Arrange for interpretive services to assist at discharge as needed  - Refer to Case Management Department for coordinating discharge planning if the patient needs post-hospital services based on physician/advanced practitioner order or complex needs related to functional status, cognitive ability, or social support system  Outcome: Progressing     Problem: Knowledge Deficit  Goal: Patient/family/caregiver demonstrates understanding of disease process, treatment plan, medications, and discharge instructions  Description  Complete learning assessment and assess knowledge base    Interventions:  - Provide teaching at level of understanding  - Provide teaching via preferred learning methods  Outcome: Progressing     Problem: RESPIRATORY - ADULT  Goal: Achieves optimal ventilation and oxygenation  Description  INTERVENTIONS:  - Assess for changes in respiratory status  - Assess for changes in mentation and behavior  - Position to facilitate oxygenation and minimize respiratory effort  - Oxygen administration by appropriate delivery method based on oxygen saturation (per order) or ABGs  - Initiate smoking cessation education as indicated  - Encourage broncho-pulmonary hygiene including cough, deep breathe, Incentive Spirometry  - Assess the need for suctioning and aspirate as needed  - Assess and instruct to report SOB or any respiratory difficulty  - Respiratory Therapy support as indicated  Outcome: Progressing

## 2019-06-30 PROBLEM — E87.6 HYPOKALEMIA: Status: ACTIVE | Noted: 2019-01-01

## 2019-06-30 NOTE — ASSESSMENT & PLAN NOTE
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  - 8/2017:  CT scan showed a small 5 mm left lower lung nodule, be observed  - 8-10/2017:  Finished concurrent chemo radiation with Mary Taxol  - 3/2018:  Repeat EGD showed local recurrence, status post esophagectomy, complicated by esophagus week age, status post stent placement, final surgery showed negative margin  - 8/2018:  Surveillance imaging showed positive mediastinum lymphadenopathy, concern for metastatic disease, status post SBRT   - 11/2018:  Restaging CT chest showed left pleural based mass, concern for metastatic disease, PET-CT was denied by insurance company, patient then lost follow-up and relocated to [place]  - 4/23/2019: Vicki Lovell, showed negative for significant finding, biopsies negative for cancer   - 6/2019:  PET-CT showed diffuse metastatic lesion, including bilateral lung pleural based lesion, diffuse lymphadenopathy, including mediastinum     - t palliative chemotherapy, with FOLFOX standard dose    - plan to admit for supportive care  - antiemetics  - pain control  - IVFat 50cc/hr for potassium administration and dextrose administration    - plan to consult oncology for additional management recommendations

## 2019-06-30 NOTE — PROGRESS NOTES
Progress Note - Dustin Reis 1955, 60 y o  female MRN: [id number]    Unit/Bed#: -Caitlin Encounter: [id number]    Primary Care Provider: Luz Wilson MD   Date and time admitted to hospital: 6/28/2019  6:09 PM        Acute respiratory failure with hypoxia [facility])  Assessment & Plan  Due to multifocal pneumonia  Patient requires up to 4 L of oxygen at this time and saturates in high 90s    Marked improvement with the antibiotic therapy  Moderate protein-calorie malnutrition (HCC)  Assessment & Plan  Malnutrition Findings:   Malnutrition type: Chronic illness  Degree of Malnutrition: Other severe protein calorie malnutritionAlbumin 2 7    BMI Findings:  BMI Classifications: Underweight < 18 5     Body mass index is 18 1 kg/m²  - ensure supplements  Nutrition consult  Cancer associated pain  Assessment & Plan  -unable to tolerate oxycodone p o  Switched to morphine IV for severe pain and for breakthrough pain  Seizure disorder (HCC)  Assessment & Plan  Continue Keppra 500 mg b i d  Converted to IV  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 [facility])  Assessment & Plan  - 7/2017: Otf  showed mid esophagus squamous cell carcinoma, 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  - 8/2017:  CT scan showed a small 5 mm left lower lung nodule, be observed  - 8-10/2017:  Finished concurrent chemo radiation with Mary Taxol  - 3/2018:  Repeat EGD showed local recurrence, status post esophagectomy, complicated by esophagus week age, status post stent placement, final surgery showed negative margin    - 8/2018:  Surveillance imaging showed positive mediastinum lymphadenopathy, concern for metastatic disease, status post SBRT   - 11/2018:  Restaging CT chest showed left pleural based mass, concern for metastatic disease, PET-CT was denied by insurance company, patient then lost follow-up and relocated to [place]  - 4/23/2019:  EGD, showed negative for significant finding, biopsies negative for cancer   - 6/2019:  PET-CT showed diffuse metastatic lesion, including bilateral lung pleural based lesion, diffuse lymphadenopathy, including mediastinum     - t palliative chemotherapy, with FOLFOX standard dose    - plan to admit for supportive care  - antiemetics  - pain control  - IVFat 50cc/hr for potassium administration and dextrose administration  - plan to consult oncology for additional management recommendations       Gastro-esophageal reflux disease without esophagitis  Assessment & Plan  - cont home ranitidine and PPI    * Multifocal pneumonia  Assessment & Plan  Chest x-ray reveals multifocal pneumonia  - Significantly decreased coarse breath sounds - likely aspiration related from multiple vomiting episodes over the last 24-48 hrs  - given that patient is immunocompromised due to the active chemotherapy, will treat with broad-spectrum antibiotics:  Cefepime, vancomycin and Flagyl  Patient does not appear septic at this time, but her oxygenation level is decreased  VTE Pharmacologic Prophylaxis:   Pharmacologic:Lovenox   Mechanical VTE Prophylaxis in Place: Yes    Patient Centered Rounds: I have performed bedside rounds with nursing staff today  Discussions with Specialists or Other Care Team Provider:  Nurse    Education and Discussions with Family / Patient:  Patient    Time Spent for Care: 20 minutes  More than 50% of total time spent on counseling and coordination of care as described above  Current Length of Stay: 1 day(s)    Current Patient Status: Inpatient   Certification Statement: The patient will continue to require additional inpatient hospital stay due to Multifocal pneumonia    Discharge Plan:  72 hour    Code Status: Level 3 - DNAR and DNI      Subjective:   Patient was seen and examined  She reports feeling somewhat better, but is still nauseous and has some pain in her stomach  She keeps refusing potassium supplementation IV      Objective:     Vitals: Temp (24hrs), Av 8 °F (36 6 °C), Min:97 2 °F (36 2 °C), Max:98 1 °F (36 7 °C)    Temp:  [97 2 °F (36 2 °C)-98 1 °F (36 7 °C)] 97 2 °F (36 2 °C)  HR:  [] 85  Resp:  [19-28] 22  BP: (128-142)/(85-98) 129/86  SpO2:  [85 %-96 %] 94 %  Body mass index is 18 1 kg/m²  Input and Output Summary (last 24 hours): Intake/Output Summary (Last 24 hours) at 2019 1603  Last data filed at 2019 1202  Gross per 24 hour   Intake 1556 25 ml   Output 500 ml   Net 1056 25 ml       Physical Exam:     Physical Exam   Constitutional: She appears cachectic  She appears ill    malnourished   HENT:   Head: Normocephalic and atraumatic  Eyes: Pupils are equal, round, and reactive to light  Cardiovascular: Normal rate and regular rhythm  Pulmonary/Chest: Effort normal and breath sounds normal  Tachypnea noted  Decreased breath sounds bilaterally   Abdominal: Soft  She exhibits no distension  Musculoskeletal: She exhibits no edema  Neurological: She is alert  She has normal strength  No cranial nerve deficit  Skin: Skin is warm  No erythema  Psychiatric: She has a normal mood and affect   Her behavior is normal        Additional Data:     Labs:    Results from last 7 days   Lab Units 19  0518 19  0642   WBC Thousand/uL 7 65 16 49*   HEMOGLOBIN g/dL 12 1 11 8   HEMATOCRIT % 35 5 36 3   PLATELETS Thousands/uL 376 373   BANDS PCT %  --  4   NEUTROS PCT % 93*  --    LYMPHS PCT % 5*  --    LYMPHO PCT %  --  6*   MONOS PCT % 1*  --    MONO PCT %  --  2*   EOS PCT % 0 0     Results from last 7 days   Lab Units 19  1156 19  0518   SODIUM mmol/L 137 135*   POTASSIUM mmol/L 2 7*  2 7* 2 6*   CHLORIDE mmol/L 98* 97*   CO2 mmol/L 28 27   BUN mg/dL 11 9   CREATININE mg/dL 0 59* 0 61   ANION GAP mmol/L 11 11   CALCIUM mg/dL 9 8 9 6   ALBUMIN g/dL  --  2 4*   TOTAL BILIRUBIN mg/dL  --  0 70   ALK PHOS U/L  --  76   ALT U/L  --  8*   AST U/L  --  27   GLUCOSE RANDOM mg/dL 115 117 Results from last 7 days   Lab Units 06/30/19  0518 06/28/19  1917   LACTIC ACID mmol/L 1 5 1 2           * I Have Reviewed All Lab Data Listed Above  * Additional Pertinent Lab Tests Reviewed:  All Labs Within Last 24 Hours Reviewed    Imaging:    Recent Cultures (last 7 days):           Last 24 Hours Medication List:     Current Facility-Administered Medications:  acetaminophen 650 mg Oral Q6H PRN Favian Foote MD    al mag oxide-diphenhydramine-lidocaine viscous 10 mL Swish & Swallow Q6H PRN Favian Foote MD    albuterol 2 5 mg Nebulization Q6H PRN Alethea Spangler MD    cefepime 2,000 mg Intravenous Q12H Alethea Spangler MD Last Rate: 2,000 mg (06/30/19 1213)   dextrose 5 % and sodium chloride 0 9 % 50 mL/hr Intravenous Continuous Alethea Spangler MD Last Rate: 50 mL/hr (06/30/19 1209)   enoxaparin 40 mg Subcutaneous Daily Favian Foote MD    famotidine 20 mg Oral Daily Favian Foote MD    gabapentin 600 mg Oral BID Favian Foote MD    guaiFENesin 600 mg Oral Q12H [facility] Favian Foote MD    ipratropium 0 5 mg Nebulization TID Alethea Spangler MD    levalbuterol 1 25 mg Nebulization TID Alethea Spangler MD    levETIRAcetam 500 mg Intravenous Q12H [facility] Alethea Spangler MD Last Rate: 500 mg (06/30/19 1313)   metroNIDAZOLE 500 mg Intravenous Q8H Alethea Spangler MD Last Rate: 500 mg (06/30/19 1313)   morphine injection 4 mg Intravenous Q6H PRN Alethea Spangler MD    morphine injection 2 mg Intravenous Q3H PRN Alethea Spangler MD    ondansetron 4 mg Intravenous Q4H PRN Alethea Spanlger MD    pantoprazole 40 mg Oral Daily Kiana Costa MD    potassium chloride 60 mEq Oral Once Kiana Costa MD    potassium chloride 20 mEq Intravenous Once Alethea Spangler MD    potassium chloride 20 mEq Intravenous Once Alethea Spangler MD    promethazine 25 mg Intramuscular Q6H PRN Favian Foote MD    saccharomyces boulardii 250 mg Oral BID MD ray Linda 3 tablet Oral HS Favian Foote MD    vancomycin 17 5 mg/kg Intravenous Delonte Lauren MD Last Rate: 750 mg (06/30/19 0033)        Today, Patient Was Seen By: Shey Altman MD    ** Please Note: Dictation voice to text software may have been used in the creation of this document   **

## 2019-06-30 NOTE — ASSESSMENT & PLAN NOTE
Severe hypokalemia persists  Potassium of 2 7 despite repletion IV  This is likely due to poor p o  Intake  Will provide 40 mEq of potassium IV at this time and recheck

## 2019-06-30 NOTE — PLAN OF CARE
Problem: Potential for Falls  Goal: Patient will remain free of falls  Description  INTERVENTIONS:  - Assess patient frequently for physical needs  -  Identify cognitive and physical deficits and behaviors that affect risk of falls    -  [place] fall precautions as indicated by assessment   - Educate patient/family on patient safety including physical limitations  - Instruct patient to call for assistance with activity based on assessment  - Modify environment to reduce risk of injury  - Consider OT/PT consult to assist with strengthening/mobility  Outcome: Progressing     Problem: PAIN - ADULT  Goal: Verbalizes/displays adequate comfort level or baseline comfort level  Description  Interventions:  - Encourage patient to monitor pain and request assistance  - Assess pain using appropriate pain scale  - Administer analgesics based on type and severity of pain and evaluate response  - Implement non-pharmacological measures as appropriate and evaluate response  - Consider cultural and social influences on pain and pain management  - Notify physician/advanced practitioner if interventions unsuccessful or patient reports new pain  Outcome: Progressing     Problem: INFECTION - ADULT  Goal: Absence or prevention of progression during hospitalization  Description  INTERVENTIONS:  - Assess and monitor for signs and symptoms of infection  - Monitor lab/diagnostic results  - Monitor all insertion sites, i e  indwelling lines, tubes, and drains  - Monitor endotracheal (as able) and nasal secretions for changes in amount and color  - [place] appropriate cooling/warming therapies per order  - Administer medications as ordered  - Instruct and encourage patient and family to use good hand hygiene technique  - Identify and instruct in appropriate isolation precautions for identified infection/condition  Outcome: Progressing     Problem: SAFETY ADULT  Goal: Patient will remain free of falls  Description  INTERVENTIONS:  - Assess patient frequently for physical needs  -  Identify cognitive and physical deficits and behaviors that affect risk of falls  -  [place] fall precautions as indicated by assessment   - Educate patient/family on patient safety including physical limitations  - Instruct patient to call for assistance with activity based on assessment  - Modify environment to reduce risk of injury  - Consider OT/PT consult to assist with strengthening/mobility  Outcome: Progressing     Problem: DISCHARGE PLANNING  Goal: Discharge to home or other facility with appropriate resources  Description  INTERVENTIONS:  - Identify barriers to discharge w/patient and caregiver  - Arrange for needed discharge resources and transportation as appropriate  - Identify discharge learning needs (meds, wound care, etc )  - Arrange for interpretive services to assist at discharge as needed  - Refer to Case Management Department for coordinating discharge planning if the patient needs post-hospital services based on physician/advanced practitioner order or complex needs related to functional status, cognitive ability, or social support system  Outcome: Progressing     Problem: Knowledge Deficit  Goal: Patient/family/caregiver demonstrates understanding of disease process, treatment plan, medications, and discharge instructions  Description  Complete learning assessment and assess knowledge base    Interventions:  - Provide teaching at level of understanding  - Provide teaching via preferred learning methods  Outcome: Progressing     Problem: RESPIRATORY - ADULT  Goal: Achieves optimal ventilation and oxygenation  Description  INTERVENTIONS:  - Assess for changes in respiratory status  - Assess for changes in mentation and behavior  - Position to facilitate oxygenation and minimize respiratory effort  - Oxygen administration by appropriate delivery method based on oxygen saturation (per order) or ABGs  - Initiate smoking cessation education as indicated  - Encourage broncho-pulmonary hygiene including cough, deep breathe, Incentive Spirometry  - Assess the need for suctioning and aspirate as needed  - Assess and instruct to report SOB or any respiratory difficulty  - Respiratory Therapy support as indicated  Outcome: Progressing     Problem: Nutrition/Hydration-ADULT  Goal: Nutrient/Hydration intake appropriate for improving, restoring or maintaining nutritional needs  Description  Monitor and assess patient's nutrition/hydration status for malnutrition (ex- brittle hair, bruises, dry skin, pale skin and conjunctiva, muscle wasting, smooth red tongue, and disorientation)  Collaborate with interdisciplinary team and initiate plan and interventions as ordered  Monitor patient's weight and dietary intake as ordered or per policy  Determine patient's food preferences and provide high-protein, high-caloric foods as appropriate       INTERVENTIONS:  - Monitor oral intake, urinary output, labs, and treatment plans  - Assess nutrition and hydration status and recommend course of action  - Evaluate amount of meals eaten  - Assist patient with eating if necessary   - Allow adequate time for meals  - Recommend/ encourage appropriate diets, oral nutritional supplements, and vitamin/mineral supplements  - Order, calculate, and assess calorie counts as needed  - Recommend, monitor, and adjust tube feedings and TPN/PPN based on assessed needs  - Assess need for intravenous fluids  - Provide nutrition/hydration education as appropriate  - Include patient/family/caregiver in decisions related to nutrition   Outcome: Progressing     Problem: Prexisting or High Potential for Compromised Skin Integrity  Goal: Skin integrity is maintained or improved  Description  INTERVENTIONS:  - Identify patients at risk for skin breakdown  - Assess and monitor skin integrity  - Assess and monitor nutrition and hydration status  - Monitor labs (i e  albumin)  - Assess for incontinence   - Turn and reposition patient  - Assist with mobility/ambulation  - Relieve pressure over bony prominences  - Avoid friction and shearing  - Provide appropriate hygiene as needed including keeping skin clean and dry  - Evaluate need for skin moisturizer/barrier cream  - Collaborate with interdisciplinary team (i e  Nutrition, Rehabilitation, etc )   - Patient/family teaching  Outcome: Progressing

## 2019-06-30 NOTE — ASSESSMENT & PLAN NOTE
-unable to tolerate oxycodone p o  Switched to morphine IV for severe pain and for breakthrough pain

## 2019-06-30 NOTE — PROGRESS NOTES
Vancomycin IV Pharmacy-to-Dose Consultation    Eileen Epstein is a 60 y o  female who is currently receiving Vancomycin IV with management by the Pharmacy Consult service  Assessment/Plan:  The patient was reviewed  Renal function is stable and no signs or symptoms of nephrotoxicity and/or infusion reactions were documented in the chart  Based on todays assessment, continue current vancomycin (day # 2) dosing of 750mg q 12, with a plan for trough to be drawn 7/1 at 1145  We will continue to follow the patients culture results and clinical progress daily      Goldy Worthy, Pharmacist

## 2019-06-30 NOTE — ASSESSMENT & PLAN NOTE
Chest x-ray reveals multifocal pneumonia  - Significantly decreased coarse breath sounds - likely aspiration related from multiple vomiting episodes over the last 24-48 hrs  - given that patient is immunocompromised due to the active chemotherapy, will treat with broad-spectrum antibiotics:  Cefepime, vancomycin and Flagyl  Patient does not appear septic at this time, but her oxygenation level is decreased

## 2019-06-30 NOTE — UTILIZATION REVIEW
Continued Stay Review      Admitted as Observation on 6/28/19 @ 94 25 77  CHANGED TO INPATIENT ON 6/29/19 @ 1124 DUE TO MULTIFOCAL PNEUMONIA AND HYPOXIA REQUIRING  4L O2 NC  Date: 6/30/19                     Current Patient Class: Inpatient Current Level of Care: Med Surg    HPI:64 y o  female initially admitted for Observation on 6/28 due for intractable nausea and vomiting  Patient has metastatic esophageal cancer and is undergoing palliative chemotherapy with FOLFox  Patient presented with cough, chest x-ray suggested right-sided pneumonia concerning for aspiration  Placed on IV antibiotics, 2L O2 NC       6/29, Nursing noted patient with O2 sat of 81% with expiratory wheezes and labored breathing  Skin appeared mottled  Hospitalist noted multifocal pneumonia on chest x-ray  Patient O2 requirement increased from 2 to 4L  Patient is immunocompromised due to active chemotherapy  Receiving broad spectrum antibiotics IV antibiotics, IV fluids, changed from Observation to Inpatient status  6/30 Potassium @ 2 7 yesterday, treated with PO and IV potassium  Remains at 2 7 today, replated with  IV potassium  Pertinent Labs/Diagnostic Results:     6/29 Chest x-ray: Bilateral multilobar pneumonia  Abnormal infiltrates are now visible on the right upper and right middle lobes      Lab Units 06/30/19  0518 06/29/19  0642 06/28/19  1917   WBC Thousand/uL 7 65 16 49* 15 21*   HEMOGLOBIN g/dL 12 1 11 8 12 1   HEMATOCRIT % 35 5 36 3 36 2   PLATELETS Thousands/uL 376 373 400*   NEUTROS ABS Thousands/µL 7 16  --   --    BANDS PCT %  --  4 4         Lab Units 06/30/19  1156 06/30/19  0518 06/29/19  0642 06/28/19  1917   SODIUM mmol/L 137 135* 138 138   POTASSIUM mmol/L 2 7*  2 7* 2 6* 2 7* 3 4*   CHLORIDE mmol/L 98* 97* 101 98*   CO2 mmol/L 28 27 28 28   ANION GAP mmol/L 11 11 9 12   BUN mg/dL 11 9 8 10   CREATININE mg/dL 0 59* 0 61 0 60 0 72   EGFR ml/min/1 73sq m 99 97 98 90   CALCIUM mg/dL 9 8 9 6 9 6 10 1   MAGNESIUM mg/dL  --  1 7  --  1 4*     Lab Units 06/30/19  0518 06/28/19 1917   AST U/L 27 14   ALT U/L 8* 8*   ALK PHOS U/L 76 84   TOTAL PROTEIN g/dL 6 9 7 4   ALBUMIN g/dL 2 4* 2 7*   TOTAL BILIRUBIN mg/dL 0 70 0 40   BILIRUBIN DIRECT mg/dL  --  0 10         Lab Units 06/30/19  1156 06/30/19  0518 06/29/19  0642 06/28/19 1917   GLUCOSE RANDOM mg/dL 115 117 103 121         Results from last 7 days   Lab Units 06/30/19  0518 06/28/19 1917   LACTIC ACID mmol/L 1 5 1 2     Vital Signs:   Date/Time  Temp  Pulse  Resp  BP  SpO2  O2 Device    06/30/19 1323  --  --  --  --  94 %  --    06/30/19 1111  97 2   85  22  129/86  92 %  --    06/30/19 0800  --  --  --  --  95 %  Nasal cannula 3L O2   06/29/19 2312  97 9   95  28 Abnormal   139/97  85 %Abnormal   --    06/29/19 2002  --  --  23 Abnormal   --  --  -- 4L O2   06/29/19 1920  --  --  --  --  96 %  Nasal cannula 6L O2   06/29/19 1604  98 1   92  19  142/96  93 %  --    06/29/19 1536  98 2   106l   19  156/104  78 %Abnormal   --    06/29/19 1313  --  --  --  --  88 %Abnormal   Nasal cannula 6L O2   06/29/19 1200  98 °  92  26Abnormal   139/74  84 %Abnormal   Nasal cannula 4L O2   06/29/19 0829  98 1  86  18  131/83  87 %Abnormal   --        Medications:   Scheduled Meds:   Current Facility-Administered Medications:  acetaminophen 650 mg Oral Q6H PRN   al mag oxide-diphenhydramine-lidocaine viscous 10 mL Swish & Swallow Q6H PRN   albuterol 2 5 mg Nebulization Q6H PRN   cefepime 2,000 mg Intravenous Q12H   dextrose 5 % and sodium chloride 0 9 % 50 mL/hr Intravenous Continuous   enoxaparin 40 mg Subcutaneous Daily   famotidine 20 mg Oral Daily   gabapentin 600 mg Oral BID   guaiFENesin 600 mg Oral Q12H [facility]   ipratropium 0 5 mg Nebulization TID   levalbuterol 1 25 mg Nebulization TID   levETIRAcetam 500 mg Intravenous Q12H MAGNOLIA   metroNIDAZOLE 500 mg Intravenous Q8H   morphine injection 4 mg Intravenous Q6H PRN   morphine injection 2 mg Intravenous Q3H PRN   ondansetron 4 mg Intravenous Q4H PRN   pantoprazole 40 mg Oral Daily   potassium chloride 60 mEq Oral Once   potassium chloride 40 mEq Intravenous Once   promethazine 25 mg Intramuscular Q6H PRN   saccharomyces boulardii 250 mg Oral BID   senna 3 tablet Oral HS   vancomycin 17 5 mg/kg Intravenous Q12H

## 2019-06-30 NOTE — ASSESSMENT & PLAN NOTE
Due to multifocal pneumonia  Patient requires up to 4 L of oxygen at this time and saturates in high 90s    Marked improvement with the antibiotic therapy

## 2019-06-30 NOTE — ASSESSMENT & PLAN NOTE
Malnutrition Findings:   Malnutrition type: Chronic illness  Degree of Malnutrition: Other severe protein calorie malnutritionAlbumin 2 7    BMI Findings:  BMI Classifications: Underweight < 18 5     Body mass index is 18 1 kg/m²  - ensure supplements  Nutrition consult

## 2019-07-01 PROBLEM — R11.2 INTRACTABLE NAUSEA AND VOMITING: Status: ACTIVE | Noted: 2019-01-01

## 2019-07-01 NOTE — CONSULTS
Consultation - Palliative and Supportive Care   Radha Huber 60 y o  female [id number]    Assessment:     Patient Active Problem List   Diagnosis    Gastro-esophageal reflux disease without esophagitis    History of breast cancer    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 ([facility] )    Seizure disorder ([facility] )    Cancer associated pain    Cellulitis of chest wall    Therapeutic opioid-induced constipation ([facility])    Dehydration    Multifocal pneumonia    Moderate protein-calorie malnutrition (HCC)    Acute respiratory failure with hypoxia (HCC)    Hypokalemia       Plan:  1  Symptom management -    - D/C IM Phenergan, DC oral Compazine, DC p r n  Zofran  Scheduled Zofran 8 mg q 8 hours  IV Reglan 10 mg q 6 hours p r n  For breakthrough nausea  - Ativan IV q 6 hours p r n  For anxiety as I believe this is a component of her distress  - Patient was using 60mg of morphine at home with oxycodone 10mg for breakthrough, exceeding at least 70 OMEs in a day  While she is unable to take PO, I will schedule her with IV morphine 4mg q4h ATC and 4mg q2h PRN for breakthrough  Will work to transition back to PO regimen when symptoms are better controlled  2  Goals - not discussed at length today, however patient was discussing with me that she is contemplating not pursuing chemotherapy  I think having a goals of care conversation is reasonable, but I will defer this conversation until she is not actively nauseated and in pain  -     Code Status: level 3   Decisional apparatus:  Patient is competent on my exam today  If competence is lost, patient's substitute decision maker would default to spouse by [place]  I have reviewed the patient's controlled substance dispensing history in the Prescription Drug Monitoring Program in compliance with the [facility] regulations before prescribing any controlled substances  We appreciate the invitation to be involved in this patient's care    We will continue to follow  Please do not hesitate to reach our on call provider through our clinic answering service at  should you have acute symptom control concerns  IDENTIFICATION:  Consults  Physician Requesting Consult: Kendy Hameed MD  Reason for Consult / Principal Problem: symptom mgmt  Hx and PE limited by: patient in severe pain/nausea  Voice quality is poor  HISTORY OF PRESENT ILLNESS:       Julio Faustin is a 60 y o  female with a history of esophageal squamous cell carcinoma who presents with shortness of breath and course breath sounds found on imaging to have multi lobar pneumonia  Since she has been here she has had difficult to control nausea, and worsening pain  She had been receiving a complex regimen as above, without relief  She was initially trialed on her home regimen of pain medication, however as her ability to take p o  Deteriorated, she was given more doses of IV  Palliative care consult to assist with symptom management  She is continues to complain of pain today  It is localized in her abdomen and throat  She reports that she has lost her voice from being ill  She has been having intractable nausea this morning, has vomited multiple times  Of note on labs patient's potassium has been low, but she has been refusing potassium supplementation owing to the size of the pills, the taste, and the burning sensation with IV potassium  She did briefly touch on goals of Care today stating that she is not sure she would like to pursue chemotherapy, especially as she is already having such symptom burden without treatment  Review of Systems   Constitution: Positive for decreased appetite, malaise/fatigue and weight loss  HENT: Positive for hoarse voice and sore throat  Cardiovascular: Negative for chest pain and leg swelling  Respiratory: Positive for cough and shortness of breath      Gastrointestinal: Positive for abdominal pain, constipation, nausea and vomiting  Neurological: Positive for weakness  Psychiatric/Behavioral: The patient has insomnia and is nervous/anxious  All other systems reviewed and are negative  Past Medical History:   Diagnosis Date    [facility]     Esophageal cancer [facility]     History of chemotherapy 2001    History of radiation therapy 2002    Seizures ([facility] )     2014     Past Surgical History:   Procedure Laterality Date    BREAST LUMPECTOMY Right 10/01/2001    malignant    BREAST SURGERY      COLONOSCOPY  2017    Clear    CT NEEDLE BIOPSY LUNG  2019    ESOPHAGECTOMY      ESOPHAGECTOMY      IR PORT PLACEMENT  2019    VT ESOPHAGOGASTRODUODENOSCOPY TRANSORAL DIAGNOSTIC N/A 2019    Procedure: ESOPHAGOGASTRODUODENOSCOPY (EGD); Surgeon: Goldy Greene MD;  Location: [facility]; Service: Gastroenterology    UPPER GASTROINTESTINAL ENDOSCOPY  2017     Social History     Socioeconomic History    Marital status: /Civil Union     Spouse name: Not on file    Number of children: Not on file    Years of education: Not on file    Highest education level: Not on file   Occupational History    Not on file   Social Needs    Financial resource strain: Not on file    Food insecurity:     Worry: Not on file     Inability: Not on file    Transportation needs:     Medical: Not on file     Non-medical: Not on file   Tobacco Use    Smoking status: Former Smoker     Packs/day: 0 50     Years: 50 00     Pack years: 25 00     Last attempt to quit: 2019     Years since quittin 0    Smokeless tobacco: Never Used    Tobacco comment: pt states that she had her last cigarette 19   Substance and Sexual Activity    Alcohol use:  Yes     Alcohol/week: 2 0 standard drinks     Types: 15 Standard drinks or equivalent per week     Frequency: 4 or more times a week     Drinks per session: 3 or 4     Binge frequency: Less than monthly     Comment: pt states she has not had a drink since before she went to the hospital    Drug use: Never    Sexual activity: Yes     Partners: Male     Birth control/protection: None   Lifestyle    Physical activity:     Days per week: Not on file     Minutes per session: Not on file    Stress: Not on file   Relationships    Social connections:     Talks on phone: Not on file     Gets together: Not on file     Attends [employer] service: Not on file     Active member of club or organization: Not on file     Attends meetings of clubs or organizations: Not on file     Relationship status: Not on file    Intimate partner violence:     Fear of current or ex partner: Not on file     Emotionally abused: Not on file     Physically abused: Not on file     Forced sexual activity: Not on file   Other Topics Concern    Not on file   Social History Narrative    Not on file     Family History   Problem Relation Age of Onset    No Known Problems Mother     No Known Problems Father     No Known Problems Sister     No Known Problems Daughter     No Known Problems Maternal Grandmother     No Known Problems Maternal Grandfather     Breast cancer Paternal Grandmother 32    No Known Problems Maternal Aunt     No Known Problems Cousin     No Known Problems Cousin     No Known Problems Cousin        MEDICATIONS / ALLERGIES:    all current active meds have been reviewed and current meds:   Current Facility-Administered Medications   Medication Dose Route Frequency    al mag oxide-diphenhydramine-lidocaine viscous (MAGIC MOUTHWASH) suspension 10 mL  10 mL Swish & Swallow Q6H PRN    albuterol inhalation solution 2 5 mg  2 5 mg Nebulization Q6H PRN    cefepime (MAXIPIME) 2,000 mg in dextrose 5 % 50 mL IVPB  2,000 mg Intravenous Q12H    enoxaparin (LOVENOX) subcutaneous injection 40 mg  40 mg Subcutaneous Daily    famotidine (PEPCID) tablet 20 mg  20 mg Oral Daily    gabapentin (NEURONTIN) capsule 600 mg  600 mg Oral BID    guaiFENesin (MUCINEX) 12 hr tablet 600 mg  600 mg Oral Q12H [address]    ipratropium (ATROVENT) 0 02 % inhalation solution 0 5 mg  0 5 mg Nebulization TID    levalbuterol (XOPENEX) inhalation solution 1 25 mg  1 25 mg Nebulization TID    levETIRAcetam (KEPPRA) 500 mg in sodium chloride 0 9 % 100 mL IVPB  500 mg Intravenous Q12H [address]    LORazepam (ATIVAN) 2 mg/mL injection 0 5 mg  0 5 mg Intravenous Q6H PRN    metoclopramide (REGLAN) injection 10 mg  10 mg Intravenous Q6H PRN    metroNIDAZOLE (FLAGYL) IVPB (premix) 500 mg  500 mg Intravenous Q8H    morphine (PF) 4 mg/mL injection 4 mg  4 mg Intravenous Q4H    morphine (PF) 4 mg/mL injection 4 mg  4 mg Intravenous Q2H PRN    multi-electrolyte (ISOLYTE-S PH 7 4 equivalent) IV solution  75 mL/hr Intravenous Continuous    ondansetron (ZOFRAN) 8 mg in sodium chloride 0 9 % 50 mL IVPB  8 mg Intravenous Q8H    pantoprazole (PROTONIX) EC tablet 40 mg  40 mg Oral Daily    potassium chloride (K-DUR,KLOR-CON) CR tablet 60 mEq  60 mEq Oral Once    saccharomyces boulardii (FLORASTOR) capsule 250 mg  250 mg Oral BID    senna (SENOKOT) tablet 25 8 mg  3 tablet Oral HS    vancomycin (VANCOCIN) IVPB (premix) 750 mg  17 5 mg/kg Intravenous Q12H       No Known Allergies    OBJECTIVE:    Physical Exam  Physical Exam   Constitutional: She is oriented to person, place, and time  She appears distressed  Frail and cachectic   HENT:   Head: Atraumatic  Right Ear: External ear normal    Left Ear: External ear normal    Nose: Nose normal    Mucous membranes dry   Eyes: Right eye exhibits no discharge  Left eye exhibits no discharge  Pulmonary/Chest: Effort normal  No respiratory distress  Abdominal: She exhibits no distension  Musculoskeletal: She exhibits no edema  Neurological: She is alert and oriented to person, place, and time  Skin: Skin is warm and dry  She is not diaphoretic  Psychiatric:   Emotionally labile today  Nursing note and vitals reviewed        Lab Results:   I have personally reviewed pertinent labs  , CBC: No results found for: WBC, HGB, HCT, MCV, PLT, ADJUSTEDWBC, MCH, MCHC, RDW, MPV, NRBC, CMP:   Lab Results   Component Value Date    SODIUM 137 06/30/2019    K 2 7 (LL) 06/30/2019    K 2 7 (LL) 06/30/2019    CL 98 (L) 06/30/2019    CO2 28 06/30/2019    BUN 11 06/30/2019    CREATININE 0 59 (L) 06/30/2019    CALCIUM 9 8 06/30/2019    EGFR 99 06/30/2019     Imaging Studies:  I personally reviewed relevant reports  EKG, Pathology, and Other Studies:  I personally reviewed all the reports  Counseling / Coordination of Care  Total floor / unit time spent today 40+ minutes  Greater than 50% of total time was spent with the patient and / or family counseling and / or coordination of care  A description of the counseling / coordination of care:  Symptom Assessment, complex medication management with changes and parenteral opioids as well as initiation of benzodiazepines  Substantial psychosocial support

## 2019-07-01 NOTE — ASSESSMENT & PLAN NOTE
Malnutrition Findings:   Malnutrition type: Chronic illness  Degree of Malnutrition: Other severe protein calorie malnutritionAlbumin 2 7    BMI Findings:  BMI Classifications: Underweight < 18 5     Body mass index is 18 1 kg/m²  - ensure supplements  Nutrition consult placed  Also, consulted GI to assess for possible esophageal obstruction which causes emesis

## 2019-07-01 NOTE — ASSESSMENT & PLAN NOTE
Antiemetics regimen has been adjusted by palliative care  Patient will be on a standing dose of Zofran and Reglan the for breakthrough nausea  Consulted GI as well to rule out esophageal obstruction

## 2019-07-01 NOTE — CONSULTS
Consultation - [facility]  Eileen Epstein 60 y o  female MRN: [id number]  Unit/Bed#: -01 Encounter: [id number]        Consults    Reason for Consult / Principal Problem: Nausea, Vomiting, Hx Esophageal Cancer s/p Esophagectomy and Gastric Pull Through    HPI: Ms Lamar Willis is a 59 yo F with a PMH of metastatic esophageal cancer s/p esophagectomy with gastric pull through currently on palliative chemotherapy, seizure disorder, tobacco use, who presented on 6/29 with intractable nausea, vomiting, and upper abdominal pain since Thursday night  She reports she had chemotherapy Wednesday-Friday and she has had this reaction to chemotherapy before  She is very uncomfortable and nauseous so she is unable to provide significant history but it seems like she was not having these symptoms prior to chemotherapy cycle  She had significant respiratory symptoms on admission and was found to have a R sided pneumonia concerning for aspiration due to significant vomiting  She did have a EGD in April with Dr Sourav Salmeron due to a hoarse voice and acid reflux and the EGD showed normal anatomy of a esophagectomy with gastric pull through, no abnormalities noted and biopsies were negative for malignancy  REVIEW OF SYSTEMS: Negative except for as stated above      Historical Information   Past Medical History:   Diagnosis Date    Breast cancer ([facility] ) 2001    Esophageal cancer ([facility] )     History of chemotherapy 11/01/2001    History of radiation therapy 01/01/2002    [facility]     11/2014     Past Surgical History:   Procedure Laterality Date    BREAST LUMPECTOMY Right 10/01/2001    malignant    BREAST SURGERY      COLONOSCOPY  7/2017    Clear    CT NEEDLE BIOPSY LUNG  6/11/2019    ESOPHAGECTOMY      ESOPHAGECTOMY      IR PORT PLACEMENT  6/21/2019    AK ESOPHAGOGASTRODUODENOSCOPY TRANSORAL DIAGNOSTIC N/A 4/23/2019    Procedure: ESOPHAGOGASTRODUODENOSCOPY (EGD);   Surgeon: Allen Mendes MD; Location: [facility];   Service: Gastroenterology    UPPER GASTROINTESTINAL ENDOSCOPY  2017     Social History   Social History     Substance and Sexual Activity   Alcohol Use Yes    Alcohol/week: 2 0 standard drinks    Types: 15 Standard drinks or equivalent per week    Frequency: 4 or more times a week    Drinks per session: 3 or 4    Binge frequency: Less than monthly    Comment: pt states she has not had a drink since before she went to the hospital     Social History     Substance and Sexual Activity   Drug Use Never     Social History     Tobacco Use   Smoking Status Former Smoker    Packs/day: 0 50    Years: 50 00    Pack years: 25 00    Last attempt to quit: 2019    Years since quittin 0   Smokeless Tobacco Never Used   Tobacco Comment    pt states that she had her last cigarette 19     Family History   Problem Relation Age of Onset    No Known Problems Mother     No Known Problems Father     No Known Problems Sister     No Known Problems Daughter     No Known Problems Maternal Grandmother     No Known Problems Maternal Grandfather     Breast cancer Paternal Grandmother 32    No Known Problems Maternal Aunt     No Known Problems Cousin     No Known Problems Cousin     No Known Problems Cousin        Meds/Allergies     Medications Prior to Admission   Medication    gabapentin (NEURONTIN) 300 mg capsule    levETIRAcetam (KEPPRA) 100 mg/mL oral solution    morphine (MS CONTIN) 30 mg 12 hr tablet    ondansetron (ZOFRAN) 4 mg tablet    [] oxyCODONE (ROXICODONE) 10 MG TABS    pantoprazole (PROTONIX) 40 mg tablet    ranitidine (ZANTAC) 150 mg tablet    senna (SENOKOT) 8 6 mg    al mag oxide-diphenhydramine-lidocaine viscous (MAGIC MOUTHWASH) 1:1:1 suspension    [] cephalexin (KEFLEX) 500 mg capsule    nicotine (NICODERM CQ) 14 mg/24hr TD 24 hr patch    [] saccharomyces boulardii (FLORASTOR) 250 mg capsule     Current Facility-Administered Medications   Medication Dose Route Frequency    al mag oxide-diphenhydramine-lidocaine viscous (MAGIC MOUTHWASH) suspension 10 mL  10 mL Swish & Swallow Q6H PRN    albuterol inhalation solution 2 5 mg  2 5 mg Nebulization Q6H PRN    cefepime (MAXIPIME) 2,000 mg in dextrose 5 % 50 mL IVPB  2,000 mg Intravenous Q12H    enoxaparin (LOVENOX) subcutaneous injection 40 mg  40 mg Subcutaneous Daily    famotidine (PEPCID) tablet 20 mg  20 mg Oral Daily    gabapentin (NEURONTIN) capsule 600 mg  600 mg Oral BID    guaiFENesin (MUCINEX) 12 hr tablet 600 mg  600 mg Oral Q12H MAGNOLIA    ipratropium (ATROVENT) 0 02 % inhalation solution 0 5 mg  0 5 mg Nebulization TID    levalbuterol (XOPENEX) inhalation solution 1 25 mg  1 25 mg Nebulization TID    levETIRAcetam (KEPPRA) 500 mg in sodium chloride 0 9 % 100 mL IVPB  500 mg Intravenous Q12H [address]    LORazepam (ATIVAN) 2 mg/mL injection 0 5 mg  0 5 mg Intravenous Q6H PRN    metoclopramide (REGLAN) injection 10 mg  10 mg Intravenous Q6H PRN    metroNIDAZOLE (FLAGYL) IVPB (premix) 500 mg  500 mg Intravenous Q8H    morphine (PF) 4 mg/mL injection 4 mg  4 mg Intravenous Q4H    morphine (PF) 4 mg/mL injection 4 mg  4 mg Intravenous Q2H PRN    multi-electrolyte (ISOLYTE-S PH 7 4 equivalent) IV solution  75 mL/hr Intravenous Continuous    ondansetron (ZOFRAN) 8 mg in sodium chloride 0 9 % 50 mL IVPB  8 mg Intravenous Q8H    pantoprazole (PROTONIX) injection 40 mg  40 mg Intravenous Q24H MAGNOLIA    potassium chloride 10 % oral solution 20 mEq  20 mEq Oral 6x Daily    saccharomyces boulardii (FLORASTOR) capsule 250 mg  250 mg Oral BID    senna (SENOKOT) tablet 25 8 mg  3 tablet Oral HS    vancomycin (VANCOCIN) IVPB (premix) 750 mg  17 5 mg/kg Intravenous Q12H       No Known Allergies        Objective     Blood pressure 131/89, pulse 84, temperature 98 6 °F (37 °C), resp  rate 18, height 5' 2" (1 575 m), weight 44 9 kg (98 lb 15 8 oz), SpO2 96 %        Intake/Output Summary (Last 24 hours) at 7/1/2019 1307  Last data filed at 6/30/2019 1808  Gross per 24 hour   Intake --   Output 100 ml   Net -100 ml         PHYSICAL EXAM:      General Appearance:   Alert, appears chronically ill, cachectic, very uncomfortable due to nausea   HEENT:   Normocephalic, atraumatic, anicteric      Neck:  Supple, symmetrical, trachea midline   Lungs:   (+) Coarse sounds bilaterally, productive cough   Heart[de-identified]   S1 and S2 normal; regular rate and rhythm; no murmur, rub, or gallop  Abdomen:   Non-distended, soft, BS Active, NTTP    Rectal:   Deferred    Extremities:  No cyanosis, clubbing or edema    Pulses:  2+ and symmetric all extremities    Skin:  (+) Pale, no jaundice, no rashes or lesions      Lab Results:   Results from last 7 days   Lab Units 06/30/19  0518   WBC Thousand/uL 7 65   HEMOGLOBIN g/dL 12 1   HEMATOCRIT % 35 5   PLATELETS Thousands/uL 376   NEUTROS PCT % 93*   LYMPHS PCT % 5*   MONOS PCT % 1*   EOS PCT % 0     Results from last 7 days   Lab Units 06/30/19  1156 06/30/19  0518   POTASSIUM mmol/L 2 7*  2 7* 2 6*   CHLORIDE mmol/L 98* 97*   CO2 mmol/L 28 27   BUN mg/dL 11 9   CREATININE mg/dL 0 59* 0 61   CALCIUM mg/dL 9 8 9 6   ALK PHOS U/L  --  76   ALT U/L  --  8*   AST U/L  --  27               Imaging Studies: I have personally reviewed pertinent imaging studies  Xr Chest 2 Views    Result Date: 6/29/2019  Impression: Bilateral multilobar pneumonia Workstation performed: [id number]     Ct Abdomen Pelvis With Contrast    Result Date: 6/28/2019  Impression: 1  Redemonstrated pleural-based masses with stable erosive change within the left lateral 9th and 10th ribs compatible with metastasis  2   Redemonstrated bibasilar scarring and bronchiectatic changes with increased patchy opacity within the right lower and middle lobes which may represent superimposed pneumonia versus aspiration  3   Diffuse thickening of the colon compatible with nonspecific colitis   4   Stable 1 cm indeterminate left adrenal nodule  Workstation performed: [id number]       ASSESSMENT and PLAN:      Nausea/Vomiting  Hx Metastatic Esophageal Cancer s/p Esophagectomy and Gastric Pull Through  - Suspect her symptoms are chemotherapy induced as they occurred on Thursday in the middle of her chemotherapy cycle (Wed-Fri)  - Agree with scheduling zofran, using reglan PRN  - Protonix 40 mg IV BID  - If symptoms persist despite supportive care, can consider barium swallow but would defer EGD due to pneumonia and known metastatic disease  - Continue treatment of aspiration pnuemonia  - EGD in April 2019 showed normal gastric pull through anatomy, biopsies neg for malignancy      The patient will be seen by Dr Dionicio Naqvi

## 2019-07-01 NOTE — ASSESSMENT & PLAN NOTE
Due to multifocal pneumonia  Patient requires up to 4 L of oxygen at this time and saturates in high 90s to low 80s    Marked improvement with the antibiotic therapy

## 2019-07-01 NOTE — ASSESSMENT & PLAN NOTE
Severe hypokalemia persists  Potassium of 2 7 despite repletion IV  This is likely due to poor p o  Intake  Nephrology has been consulted  Recommended the infusion of isolyte and oral liquid potassium  Patient will attempt to take the oral potassium

## 2019-07-01 NOTE — ASSESSMENT & PLAN NOTE
Still poorly controlled  Involved palliative care  They adjusted pain management for the patient and will continue with IV morphine

## 2019-07-01 NOTE — CONSULTS
Consultation - Infectious Disease   Eileen Epstein 60 y o  female MRN: [id number]  Unit/Bed#: -01 Encounter: [id number]      IMPRESSION & RECOMMENDATIONS:   Impression/Recommendations: This is a 60 y o  female, with a soft tissue carcinoma, came to ER on evening of 6/28 with intractable nausea and vomiting after receiving chemotherapy  On presentation, she was found to have multifocal right-sided pneumonia  Patient is clinically improved on antibiotic  1  Multifocal pneumonia, involving right side  Given intractable nausea and vomiting, this is most likely aspiration pneumonia  Patient is currently clinically improved on IV antibiotics  Given multiple healthcare contact, patient has risk of resistant GNR  However, risk of MRSA pneumonia is not high  Patient is systemically well, without sepsis or systemic toxicity  Continue IV cefepime  Continue Flagyl but can be transitioned to p o  No further need for vancomycin  I will discontinue  Monitor temperature/WBC  Monitor respiratory symptoms  2  Acute hypoxic respiratory failure, secondary to pneumonia above  Patient is clinically improved  O2 support is minimal now  Antibiotic plan as in above  O2 support per primary service  3  Intractable nausea and vomiting, most likely chemotherapy induced  Symptoms are well controlled now  Patient will need to discuss with her oncologist regarding any chemotherapy modification to prevent future symptoms  Symptom management per primary service  4  Moderate protein calorie malnutrition  5  Metastatic esophageal carcinoma, on XRT and chemotherapy  Patient is currently not neutropenic  Previous records reviewed in detail  Discussed with the patient and his sister in detail regarding the above plan  Thank you for this consultation  We will follow along with you  HISTORY OF PRESENT ILLNESS:  Reason for Consult:  Pneumonia      HPI: Eileen Epstein is a 60 y o  female, with esophageal cancer, on chemotherapy and XRT, had her last course of chemotherapy 5 days ago  Afterwards, she had severe nausea and vomiting  This prompted her to come to the ER on 06/28  On presentation, patient did not have fever but had leukocytosis  CXR and abdomen/pelvis CT showed right-sided infiltrates  Patient was admitted  She was initially started on IV Unasyn  Antibiotic was subsequently changed to vancomycin/cefepime/Flagyl  We are asked to evaluate the patient  The thigh, patient feels a lot better  Nausea and vomiting is resolved  Patient has upper abdominal pain, which she feels is secondary to her uncontrollable vomiting earlier  She also had dyspnea on presentation, which is improved  Patient has had prior history of intractable nausea and vomiting from chemotherapy  REVIEW OF SYSTEMS:  A complete 12 point system-based review was done  Except for what is noted in HPI above, ROS of systems is otherwise negative  PAST MEDICAL HISTORY:  Past Medical History:   Diagnosis Date    Breast cancer ([facility] ) 2001    Esophageal cancer ([place] [address] )     History of chemotherapy 11/01/2001    History of radiation therapy 01/01/2002    Seizures ([facility] )     11/2014     Past Surgical History:   Procedure Laterality Date    BREAST LUMPECTOMY Right 10/01/2001    malignant    BREAST SURGERY      COLONOSCOPY  7/2017    Clear    CT NEEDLE BIOPSY LUNG  6/11/2019    ESOPHAGECTOMY      ESOPHAGECTOMY      IR PORT PLACEMENT  6/21/2019    MS ESOPHAGOGASTRODUODENOSCOPY TRANSORAL DIAGNOSTIC N/A 4/23/2019    Procedure: ESOPHAGOGASTRODUODENOSCOPY (EGD); Surgeon: Alexi Figueroa MD;  Location: [facility]; Service: Gastroenterology    UPPER GASTROINTESTINAL ENDOSCOPY  7/2017     Problem list reviewed      FAMILY HISTORY:  Non-contributory    SOCIAL HISTORY:  Social History     Substance and Sexual Activity   Alcohol Use Yes    Alcohol/week: 2 0 standard drinks    Types: 15 Standard drinks or equivalent per week    Frequency: 4 or more times a week    Drinks per session: 3 or 4    Binge frequency: Less than monthly    Comment: pt states she has not had a drink since before she went to the hospital     Social History     Substance and Sexual Activity   Drug Use Never     Social History     Tobacco Use   Smoking Status Former Smoker    Packs/day: 0 50    Years: 50 00    Pack years: 25 00    Last attempt to quit: 2019    Years since quittin 0   Smokeless Tobacco Never Used   Tobacco Comment    pt states that she had her last cigarette 19       ALLERGIES:  No Known Allergies    MEDICATIONS:  All current active medications have been reviewed  Patient is currently on vancomycin/cefepime/Flagyl  PHYSICAL EXAM:  Vitals:  Temp:  [97 9 °F (36 6 °C)-98 6 °F (37 °C)] 98 6 °F (37 °C)  HR:  [84-99] 84  Resp:  [16-18] 18  BP: (118-133)/(77-89) 131/89  SpO2:  [81 %-98 %] 98 %  Temp (24hrs), Av 2 °F (36 8 °C), Min:97 9 °F (36 6 °C), Max:98 6 °F (37 °C)  Current: Temperature: 98 6 °F (37 °C)     Physical Exam:  General:  Cachectic, acute and chronically ill-appearing, comfortable, nontoxic  Awake, alert and oriented x 3  Eyes:  Conjunctive clear with no hemorrhages or effusions  Oropharynx:  No ulcers, no lesions, pharynx benign, no tonsillitis  Neck:  Supple, no lymphadenopathy, no mass, nontender  Lungs:  Decreased breath sounds, R > L basilar rales, no wheezing, no accessory muscle use  Cardiac:  Regular rate and rhythm, normal S1, normal S2, no murmurs  Abdomen:  Soft, nondistended, non-tender, no HSM  Extremities:  Trace edema, no erythema, nontender  No ulcers  Skin:  No rashes, no ulcers  Neurological:  Moves all four extremities spontaneously, sensation grossly intact    LABS, IMAGING, & OTHER STUDIES:  Lab Results:  I have personally reviewed pertinent labs    Results from last 7 days   Lab Units 19  1311 19  1156 19  0518  19  1917   POTASSIUM mmol/L 2 0* 2 7*  2 7* 2 6*   < > 3 4* CHLORIDE mmol/L 99* 98* 97*   < > 98*   CO2 mmol/L 31 28 27   < > 28   BUN mg/dL 11 11 9   < > 10   CREATININE mg/dL 0 57* 0 59* 0 61   < > 0 72   EGFR ml/min/1 73sq m 100 99 97   < > 90   CALCIUM mg/dL 9 6 9 8 9 6   < > 10 1   AST U/L  --   --  27  --  14   ALT U/L  --   --  8*  --  8*   ALK PHOS U/L  --   --  76  --  84    < > = values in this interval not displayed  Results from last 7 days   Lab Units 07/01/19  1311 06/30/19  0518 06/29/19  0642   WBC Thousand/uL 12 38* 7 65 16 49*   HEMOGLOBIN g/dL 11 2* 12 1 11 8   PLATELETS Thousands/uL 281 376 373           Imaging Studies:   I have personally reviewed pertinent imaging study reports and images in PACS  CXR reviewed personally  RLL and RML infiltrates  Abdomen/pelvis CT reviewed personally  Left pleural based masses with erosive changes  RLL and RML infiltrates  Diffuse colonic wall thickening  EKG, Pathology, and Other Studies:   I have personally reviewed pertinent reports

## 2019-07-01 NOTE — PROGRESS NOTES
Progress Note - [place] 1955, 60 y o  female MRN: [id number]    Unit/Bed#: -01 Encounter: [id number]    Primary Care Provider: Akhil Self MD   Date and time admitted to hospital: 6/28/2019  6:09 PM        Intractable nausea and vomiting  Assessment & Plan  Antiemetics regimen has been adjusted by palliative care  Patient will be on a standing dose of Zofran and Reglan the for breakthrough nausea  Consulted GI as well to rule out esophageal obstruction  Hypokalemia  Assessment & Plan  Severe hypokalemia persists  Potassium of 2 7 despite repletion IV  This is likely due to poor p o  Intake  Nephrology has been consulted  Recommended the infusion of isolyte and oral liquid potassium  Patient will attempt to take the oral potassium  Acute respiratory failure with hypoxia [facility])  Assessment & Plan  Due to multifocal pneumonia  Patient requires up to 4 L of oxygen at this time and saturates in high 90s to low 80s    Marked improvement with the antibiotic therapy  Moderate protein-calorie malnutrition (HCC)  Assessment & Plan  Malnutrition Findings:   Malnutrition type: Chronic illness  Degree of Malnutrition: Other severe protein calorie malnutritionAlbumin 2 7    BMI Findings:  BMI Classifications: Underweight < 18 5     Body mass index is 18 1 kg/m²  - ensure supplements  Nutrition consult placed  Also, consulted GI to assess for possible esophageal obstruction which causes emesis  Cancer associated pain  Assessment & Plan  Still poorly controlled  Involved palliative care  They adjusted pain management for the patient and will continue with IV morphine  Seizure disorder (HCC)  Assessment & Plan  Continue Keppra 500 mg b i d  Converted to IV      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 [facility])  Assessment & Plan  - 7/2017: Barrington Aase showed mid esophagus squamous cell carcinoma, 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  - 8/2017:  CT scan showed a small 5 mm left lower lung nodule, be observed  - 8-10/2017:  Finished concurrent chemo radiation with Johny Mis Taxol  - 3/2018:  Repeat EGD showed local recurrence, status post esophagectomy, complicated by esophagus week age, status post stent placement, final surgery showed negative margin  - 8/2018:  Surveillance imaging showed positive mediastinum lymphadenopathy, concern for metastatic disease, status post SBRT   - 11/2018:  Restaging CT chest showed left pleural based mass, concern for metastatic disease, PET-CT was denied by insurance company, patient then lost follow-up and relocated to [place]  - 4/23/2019: Sanjay Capps, showed negative for significant finding, biopsies negative for cancer   - 6/2019:  PET-CT showed diffuse metastatic lesion, including bilateral lung pleural based lesion, diffuse lymphadenopathy, including mediastinum     - t palliative chemotherapy, with FOLFOX standard dose    -   Oncology will follow up  For now, supportive management, pain control and nausea control  Also, patient is quite malnourished and would benefit from GI evaluation for PEG placement  Gastro-esophageal reflux disease without esophagitis  Assessment & Plan  - cont home ranitidine and PPI    * Multifocal pneumonia  Assessment & Plan  Chest x-ray reveals multifocal pneumonia  - Significantly decreased coarse breath sounds - likely aspiration related from multiple vomiting episodes over the last 24-48 hrs  - given that patient is immunocompromised due to the active chemotherapy, will treat with broad-spectrum antibiotics:  Cefepime, vancomycin and Flagyl  Patient does not appear septic at this time, but her oxygenation level is decreased  VTE Pharmacologic Prophylaxis:   Pharmacologic: Enoxaparin (Lovenox)  Mechanical VTE Prophylaxis in Place: Yes    Patient Centered Rounds: I have performed bedside rounds with nursing staff today      Discussions with Specialists or Other Care Team Provider:  Palliative care    Education and Discussions with Family / Patient:  Patient    Time Spent for Care: 20 minutes  More than 50% of total time spent on counseling and coordination of care as described above  Current Length of Stay: 2 day(s)    Current Patient Status: Inpatient   Certification Statement: The patient will continue to require additional inpatient hospital stay due to Intractable nausea and p o  Intolerance    Discharge Plan:  48-72 hours    Code Status: Level 3 - DNAR and DNI      Subjective:   Patient was seen and examined today  She continues to complain of intractable nausea and has been vomiting since this morning  She also reports pain in her abdomen  Objective:     Vitals:   Temp (24hrs), Av °F (36 7 °C), Min:97 2 °F (36 2 °C), Max:98 6 °F (37 °C)    Temp:  [97 2 °F (36 2 °C)-98 6 °F (37 °C)] 98 6 °F (37 °C)  HR:  [84-99] 84  Resp:  [16-18] 18  BP: (118-133)/(77-89) 131/89  SpO2:  [81 %-97 %] 96 %  Body mass index is 18 1 kg/m²  Input and Output Summary (last 24 hours): Intake/Output Summary (Last 24 hours) at 2019 1136  Last data filed at 2019 1808  Gross per 24 hour   Intake --   Output 400 ml   Net -400 ml       Physical Exam:     Physical Exam   Constitutional: She appears cachectic  She appears ill    malnourished   HENT:   Head: Normocephalic and atraumatic  Eyes: Pupils are equal, round, and reactive to light  Cardiovascular: Normal rate and regular rhythm  Pulmonary/Chest: Effort normal and breath sounds normal  No tachypnea  Decreased breath sounds bilaterally   Abdominal: Soft  She exhibits no distension  There is tenderness (Epigastric)  Musculoskeletal: She exhibits no edema  Neurological: She is alert  She has normal strength  No cranial nerve deficit  Skin: Skin is warm  No erythema  Psychiatric: She has a normal mood and affect   Her behavior is normal             Additional Data:     Labs:    Results from last 7 days   Lab Units 19  3491 06/29/19  0642   WBC Thousand/uL 7 65 16 49*   HEMOGLOBIN g/dL 12 1 11 8   HEMATOCRIT % 35 5 36 3   PLATELETS Thousands/uL 376 373   BANDS PCT %  --  4   NEUTROS PCT % 93*  --    LYMPHS PCT % 5*  --    LYMPHO PCT %  --  6*   MONOS PCT % 1*  --    MONO PCT %  --  2*   EOS PCT % 0 0     Results from last 7 days   Lab Units 06/30/19  1156 06/30/19  0518   SODIUM mmol/L 137 135*   POTASSIUM mmol/L 2 7*  2 7* 2 6*   CHLORIDE mmol/L 98* 97*   CO2 mmol/L 28 27   BUN mg/dL 11 9   CREATININE mg/dL 0 59* 0 61   ANION GAP mmol/L 11 11   CALCIUM mg/dL 9 8 9 6   ALBUMIN g/dL  --  2 4*   TOTAL BILIRUBIN mg/dL  --  0 70   ALK PHOS U/L  --  76   ALT U/L  --  8*   AST U/L  --  27   GLUCOSE RANDOM mg/dL 115 117                 Results from last 7 days   Lab Units 06/30/19  0518 06/28/19  1917   LACTIC ACID mmol/L 1 5 1 2           * I Have Reviewed All Lab Data Listed Above  * Additional Pertinent Lab Tests Reviewed:  All Labs Within Last 24 Hours Reviewed    Imaging:      Recent Cultures (last 7 days):           Last 24 Hours Medication List:     Current Facility-Administered Medications:  al mag oxide-diphenhydramine-lidocaine viscous 10 mL Swish & Swallow Q6H PRN Favian Foote MD    albuterol 2 5 mg Nebulization Q6H PRN Pilo Ramirez MD    cefepime 2,000 mg Intravenous Q12H Pilo Ramirez MD Last Rate: 2,000 mg (06/30/19 2312)   enoxaparin 40 mg Subcutaneous Daily Favian Foote MD    famotidine 20 mg Oral Daily Favian Foote MD    gabapentin 600 mg Oral BID Favian Foote MD    guaiFENesin 600 mg Oral Q12H [facility] Favian Foote MD    ipratropium 0 5 mg Nebulization TID Pilo Ramirez MD    levalbuterol 1 25 mg Nebulization TID Pilo Ramirez MD    levETIRAcetam 500 mg Intravenous Q12H [facility] Pilo Ramirez MD Last Rate: 500 mg (07/01/19 3075)   LORazepam 0 5 mg Intravenous Q6H PRN Alyssia Long MD    metoclopramide 10 mg Intravenous Q6H PRN Alyssia Long MD    metroNIDAZOLE 500 mg Intravenous Sunil Leo MD Last Rate: 500 mg (07/01/19 0533)   morphine injection 4 mg Intravenous Q4H Sierra Bain MD    morphine injection 4 mg Intravenous Q2H PRN Sierra Bain MD    multi-electrolyte 75 mL/hr Intravenous Continuous Yamini Alonso MD Last Rate: 75 mL/hr (07/01/19 1028)   ondansetron 8 mg Intravenous Milind Lamb MD    pantoprazole 40 mg Intravenous Q24H [facility] Yamini Alonso MD    potassium chloride 60 mEq Oral Once Isael Martinez MD    saccharomyces boulardii 250 mg Oral BID Favian Foote MD    senna 3 tablet Oral HS Favian Foote MD    vancomycin 17 5 mg/kg Intravenous Q12H Yamini Alonso MD Last Rate: 750 mg (07/01/19 0053)        Today, Patient Was Seen By: Yamini Alonso MD    ** Please Note: Dictation voice to text software may have been used in the creation of this document   **

## 2019-07-01 NOTE — ASSESSMENT & PLAN NOTE
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  - 8/2017:  CT scan showed a small 5 mm left lower lung nodule, be observed  - 8-10/2017:  Finished concurrent chemo radiation with Claudia Rook Taxol  - 3/2018:  Repeat EGD showed local recurrence, status post esophagectomy, complicated by esophagus week age, status post stent placement, final surgery showed negative margin  - 8/2018:  Surveillance imaging showed positive mediastinum lymphadenopathy, concern for metastatic disease, status post SBRT   - 11/2018:  Restaging CT chest showed left pleural based mass, concern for metastatic disease, PET-CT was denied by insurance company, patient then lost follow-up and relocated to [place]  - 4/23/2019: Barrington Aase, showed negative for significant finding, biopsies negative for cancer   - 6/2019:  PET-CT showed diffuse metastatic lesion, including bilateral lung pleural based lesion, diffuse lymphadenopathy, including mediastinum     - t palliative chemotherapy, with FOLFOX standard dose    -   Oncology will follow up  For now, supportive management, pain control and nausea control  Also, patient is quite malnourished and would benefit from GI evaluation for PEG placement

## 2019-07-01 NOTE — CONSULTS
Patient: Hansa Avila  Patient MRN: [id number]  Service date: 7/1/2019  Attending Physician:       CHIEF COMPLAIN  Chief Complaint   Patient presents with    Vomiting     pt finished 3rd round of chemo today, unable to keep anything down  was given fluids and nausea medicatiojns this morning but feels that it was not enough  pt vomiting during triage       Heme / Oncology history:  Hansa Avila is a 60 y o  female       1,  esophageal squamous cell carcinoma  - transferring care from [facility] to 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  - 8/2017:  CT scan showed a small 5 mm left lower lung nodule, be observed  - 8-10/2017:  Finished concurrent chemo radiation with Mary Taxol  - 3/2018:  Repeat EGD showed local recurrence, status post esophagectomy, complicated by esophagus week age, status post stent placement, final surgery showed negative margin  - 8/2018:  Surveillance imaging showed positive mediastinum lymphadenopathy, concern for metastatic disease, status post SBRT     - 11/2018:  Restaging CT chest showed left pleural based mass, concern for metastatic disease, PET-CT was denied by insurance company, patient then lost follow-up and relocated to [place]  - 4/23/2019:  EGD, showed negative for significant finding, biopsies negative for cancer   - 6/2019:  PET-CT showed diffuse metastatic lesion, including bilateral lung pleural based lesion, diffuse lymphadenopathy, including mediastinum      - t palliative chemotherapy, with FOLFOX standard dose on 6/26           2, history of left breast stage I invasive carcinoma  - - initially diagnosed in 2000 1, right breast, status post lumpectomy followed by radiation, followed by tamoxifen for 5 years then stop         3, long-term smoker     4, history of seizure  - on Keppra, last episode in 2014      HISTORY OF PRESENT ILLNESS:  Ronny Pruitt Reynaldo Whitman is a 60 y o  female who is well known to me from outpatient setting, receiving active chemotherapy, last treatment on 06/26 presents with possible aspiration pneumonia, nausea vomiting and diarrhea  Patient reported since this afternoon her symptom has been better controlled  She is still feeling very tired, she preferred to be left alone  ASSESSMENT/PLAN:  Hansa Avila is a 60 y o  female with:    1) esophageal squamous cell carcinoma  - diffusely metastatic, received 1st chemotherapy  In general squamous cell carcinoma do not have a good response to the chemotherapy  With the extensive miss of metastatic disease, patient's poor ability, underlying condition, I think to consider hospice is not a bad option  I again emphasized with patient to consider the quality versus quantity  Patient feels nausea, she will consider all these options  - appreciate the input of palliative care, I agreed to have goal of care discussion in the future, but in this hospitalization  My personal recommendation for patient is to strongly consider hospice  2) nausea vomiting/diarrhea  - chemo related  Continue current supportive care  Has been getting better since admission  3) hypokalemia  - secondary to volume loss  minutes were spent face to face with patient with greater than 50% of the time spent in counseling or coordination of care including discussions of treatment instructions  All of the patient's questions were answered to their satisfactory during this discussion         Pau Vargas MD PhD  Hematology / Oncology              PROBLEM LIST:  Patient Active Problem List   Diagnosis    Gastro-esophageal reflux disease without esophagitis    History of breast cancer    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 ([facility] )    Seizure disorder ([facility] )    Cancer associated pain    Cellulitis of chest wall    Therapeutic opioid-induced constipation ([facility])    Dehydration    Multifocal pneumonia    Moderate protein-calorie malnutrition (HCC)    Acute respiratory failure with hypoxia (HCC)    Hypokalemia    Intractable nausea and vomiting                     PAST MEDICAL HISTORY:   has a past medical history of Breast cancer ([facility] [address] [address] ) (2001), Esophageal cancer ([facility] ), History of chemotherapy (11/01/2001), History of radiation therapy (01/01/2002), and Seizures ([facility] )  PAST SURGICAL HISTORY:   has a past surgical history that includes Esophagectomy; Esophagectomy; Breast surgery; pr esophagogastroduodenoscopy transoral diagnostic (N/A, 4/23/2019); Breast lumpectomy (Right, 10/01/2001); CT needle biopsy lung (6/11/2019); Colonoscopy (7/2017); Upper gastrointestinal endoscopy (7/2017); and IR port Placement (6/21/2019)      CURRENT MEDICATIONS  Scheduled Meds:  Current Facility-Administered Medications:  al mag oxide-diphenhydramine-lidocaine viscous 10 mL Swish & Swallow Q6H PRN Favian Foote MD    albuterol 2 5 mg Nebulization Q6H PRN Clemencia Ross MD    cefepime 2,000 mg Intravenous Q12H Clemencia Ross MD Last Rate: 2,000 mg (07/01/19 1454)   enoxaparin 40 mg Subcutaneous Daily Favian Foote MD    famotidine 20 mg Oral Daily Favian Foote MD    gabapentin 600 mg Oral BID Favian Foote MD    guaiFENesin 600 mg Oral Q12H [address] Favian Foote MD    ipratropium 0 5 mg Nebulization TID Clemencia Ross MD    levalbuterol 1 25 mg Nebulization TID Clemencia Ross MD    levETIRAcetam 500 mg Intravenous Q12H [address] Clemencia Ross MD Last Rate: 500 mg (07/01/19 0935)   LORazepam 0 5 mg Intravenous Q6H PRN Stacey Banks MD    metoclopramide 10 mg Intravenous Q6H [address] Skye Coppola PA-C    metroNIDAZOLE 500 mg Intravenous Q8H Clemencia Ross MD Last Rate: 500 mg (07/01/19 1144)   morphine injection 4 mg Intravenous Q4H Stacey Banks MD    morphine injection 4 mg Intravenous Q2H PRN Stacey Banks MD    multi-electrolyte 75 mL/hr Intravenous Continuous Clemencia Ross MD Last Rate: 75 mL/hr (07/01/19 1028)   ondansetron 8 mg Intravenous Q8H Rianna Davila MD Last Rate: 8 mg (07/01/19 1143)   pantoprazole 40 mg Intravenous Q12H [address] Skye Coppola PA-C    potassium chloride 20 mEq Oral 6x Daily Abel Tellez MD    saccharomyces boulardii 250 mg Oral BID Favian Foote MD    senna 3 tablet Oral HS Favian Foote MD    vancomycin 17 5 mg/kg Intravenous Q8H Arcadio Atkinson MD Last Rate: 750 mg (07/01/19 1454)     Continuous Infusions:  multi-electrolyte 75 mL/hr Last Rate: 75 mL/hr (07/01/19 1028)     PRN Meds:   al mag oxide-diphenhydramine-lidocaine viscous    albuterol    LORazepam    morphine injection    SOCIAL HISTORY:   reports that she quit smoking 6 days ago  She has a 25 00 pack-year smoking history  She has never used smokeless tobacco  She reports that she drinks about 2 0 standard drinks of alcohol per week  She reports that she does not use drugs  FAMILY HISTORY:  family history includes Breast cancer (age of onset: 32) in her paternal grandmother; No Known Problems in her cousin, cousin, cousin, daughter, father, maternal aunt, maternal grandfather, maternal grandmother, mother, and sister  ALLERGIES:  has No Known Allergies  REVIEW OF SYSTEMS:  Please note that a 14-point review of systems was performed to include Constitutional, HEENT, Respiratory, CVS, GI, , Musculoskeletal, Integumentary, Neurologic, Rheumatologic, Endocrinologic, Psychiatric, Lymphatic, and Hematologic/Oncologic systems were reviewed and are negative unless otherwise stated in HPI  Positive and negative findings pertinent to this evaluation are incorporated into the history of present illness  PHYSICAL EXAMINATION:  Vital Signs: Temp:  [97 9 °F (36 6 °C)-98 6 °F (37 °C)] 98 6 °F (37 °C)  HR:  [84-99] 84  Resp:  [16-18] 18  BP: (118-133)/(77-89) 131/89  Body mass index is 18 1 kg/m²  Body surface area is 1 42 meters squared  Appears extremely tired  Appears pale    Constitutional: Alert and oriented    HEENT: Anicteric, PERRLA  Chest: Decreased breathing sound bilaterally, No wheezes/rales/rhonchi  CVS: Regular rhythm  Normal rate  Abdomen: Soft, nontender, nondistended  No palpable organomegaly  Extremities: No cyanosis/clubbing/edema  Integumentary: No obvious rashes or bruises  Musculoskeletal: No obvious bony or joint deformities  Psychiatric: Appropriate affect and mood  Lymph Node Survey: No palpable preauricular, submandibular, cervical, supraclavicular, axillary, epitrochlear or inguinal lymphadenopathy  LABS:      CBC with diff: Results from last 7 days   Lab Units 07/01/19  1311 06/30/19  0518 06/29/19  0642 06/28/19  1917   WBC Thousand/uL 12 38* 7 65 16 49* 15 21*   HEMOGLOBIN g/dL 11 2* 12 1 11 8 12 1   HEMATOCRIT % 32 8* 35 5 36 3 36 2   MCV fL 93 94 98 96   PLATELETS Thousands/uL 281 376 373 400*   MCH pg 31 9 32 0 31 8 32 0   MCHC g/dL 34 1 34 1 32 5 33 4   RDW % 11 7 11 9 11 9 12 0   MPV fL 8 4* 8 2* 8 4* 8 2*   NRBC AUTO /100 WBCs 0 0 0 0       CMP:  Results from last 7 days   Lab Units 07/01/19  1311 06/30/19  1156 06/30/19  0518 06/29/19  0642 06/28/19  1917   POTASSIUM mmol/L 2 0* 2 7*  2 7* 2 6* 2 7* 3 4*   CHLORIDE mmol/L 99* 98* 97* 101 98*   CO2 mmol/L 31 28 27 28 28   BUN mg/dL 11 11 9 8 10   CREATININE mg/dL 0 57* 0 59* 0 61 0 60 0 72   CALCIUM mg/dL 9 6 9 8 9 6 9 6 10 1   AST U/L  --   --  27  --  14   ALT U/L  --   --  8*  --  8*   ALK PHOS U/L  --   --  76  --  84   EGFR ml/min/1 73sq m 100 99 97 98 90                 Invalid input(s): TNI,  PCT              IMAGING:  XR chest 2 views   Final Result      Bilateral multilobar pneumonia            Workstation performed: [id number]         CT abdomen pelvis with contrast   Final Result      1  Redemonstrated pleural-based masses with stable erosive change within the left lateral 9th and 10th ribs compatible with metastasis     2   Redemonstrated bibasilar scarring and bronchiectatic changes with increased patchy opacity within the right lower and middle lobes which may represent superimposed pneumonia versus aspiration  3   Diffuse thickening of the colon compatible with nonspecific colitis  4   Stable 1 cm indeterminate left adrenal nodule        Workstation performed: [id number]

## 2019-07-01 NOTE — CONSULTS
NEPHROLOGY CONSULTATION NOTE    Patient: Lane Jones               Sex: female          DOA: 6/28/2019  6:09 PM   YOB: 1955        Age:  60 y o         LOS:  LOS: 2 days     REFERRING PHYSICIAN: Dr Vasquez Linares / CONSULTATION:  Hypokalemia    DATE OF CONSULTATION / SERVICE: 7/1/2019    ADMISSION DIAGNOSIS: Aspiration pneumonia (HCC)     CHIEF COMPLAINT     Nausea and vomiting    HPI     This is a 60years old female with past medical history of metastatic esophageal cancer on palliative chemotherapy, dysphagia, seizure disorder who presented with 24 history of intractable nausea and vomiting on June 29  Patient found to have hypokalemia with serum potassium as low as 2 7  Renal consult requested due to persistence of hypokalemia  Attempts have been made to administer oral potassium supplements however patient had refused due to difficulty in swallowing them  Furthermore attempts have been made to administer IV KCl however patient refuses them to secondary to pain  Current serum potassium is 2 7 remains low  Furthermore, patient had been on dextrose based fluids which may be causing potassium shift in the cells  Currently patient denies nausea, vomiting, headache, dizziness, abdominal pain, constipation or rash  PAST MEDICAL HISTORY     Past Medical History:   Diagnosis Date    Breast cancer ([facility] ) 2001    Esophageal cancer ([facility] )     History of chemotherapy 11/01/2001    History of radiation therapy 01/01/2002    Seizures ([facility] )     11/2014       PAST SURGICAL HISTORY     Past Surgical History:   Procedure Laterality Date    BREAST LUMPECTOMY Right 10/01/2001    malignant    BREAST SURGERY      COLONOSCOPY  7/2017    Clear    CT NEEDLE BIOPSY LUNG  6/11/2019    ESOPHAGECTOMY      ESOPHAGECTOMY      IR PORT PLACEMENT  6/21/2019    OH ESOPHAGOGASTRODUODENOSCOPY TRANSORAL DIAGNOSTIC N/A 4/23/2019    Procedure: ESOPHAGOGASTRODUODENOSCOPY (EGD);   Surgeon: Rianna Peres MD;  Location: [facility];   Service: Gastroenterology    UPPER GASTROINTESTINAL ENDOSCOPY  2017       ALLERGIES     No Known Allergies    SOCIAL HISTORY     Social History     Substance and Sexual Activity   Alcohol Use Yes    Alcohol/week: 2 0 standard drinks    Types: 15 Standard drinks or equivalent per week    Frequency: 4 or more times a week    Drinks per session: 3 or 4    Binge frequency: Less than monthly    Comment: pt states she has not had a drink since before she went to the hospital     Social History     Substance and Sexual Activity   Drug Use Never     Social History     Tobacco Use   Smoking Status Former Smoker    Packs/day: 0 50    Years: 50 00    Pack years: 25 00    Last attempt to quit: 2019    Years since quittin 0   Smokeless Tobacco Never Used   Tobacco Comment    pt states that she had her last cigarette 19       FAMILY HISTORY     Family History   Problem Relation Age of Onset    No Known Problems Mother     No Known Problems Father     No Known Problems Sister     No Known Problems Daughter     No Known Problems Maternal Grandmother     No Known Problems Maternal Grandfather     Breast cancer Paternal Grandmother 32    No Known Problems Maternal Aunt     No Known Problems Cousin     No Known Problems Cousin     No Known Problems Cousin        CURRENT MEDICATIONS       Current Facility-Administered Medications:     al mag oxide-diphenhydramine-lidocaine viscous (MAGIC MOUTHWASH) suspension 10 mL, 10 mL, Swish & Swallow, Q6H PRN, Favian Foote MD, 10 mL at 19 0308    albuterol inhalation solution 2 5 mg, 2 5 mg, Nebulization, Q6H PRN, Pilo Ramirez MD    cefepime (MAXIPIME) 2,000 mg in dextrose 5 % 50 mL IVPB, 2,000 mg, Intravenous, Q12H, Pilo Ramirez MD, Last Rate: 100 mL/hr at 192, 2,000 mg at 19 2312    enoxaparin (LOVENOX) subcutaneous injection 40 mg, 40 mg, Subcutaneous, Daily, Guadelupe Carrel, MD, 40 mg at 07/01/19 0928    famotidine (PEPCID) tablet 20 mg, 20 mg, Oral, Daily, Favian Foote MD, 20 mg at 07/01/19 0926    gabapentin (NEURONTIN) capsule 600 mg, 600 mg, Oral, BID, Favian Foote MD, 600 mg at 07/01/19 0927    guaiFENesin (MUCINEX) 12 hr tablet 600 mg, 600 mg, Oral, Q12H [address], Favian Foote MD, 600 mg at 07/01/19 0930    ipratropium (ATROVENT) 0 02 % inhalation solution 0 5 mg, 0 5 mg, Nebulization, TID, Dayo Vance MD, 0 5 mg at 07/01/19 0800    levalbuterol (Darlean Basques) inhalation solution 1 25 mg, 1 25 mg, Nebulization, TID, Dayo Vance MD, 1 25 mg at 07/01/19 0800    levETIRAcetam (KEPPRA) 500 mg in sodium chloride 0 9 % 100 mL IVPB, 500 mg, Intravenous, Q12H [address], Dayo Vance MD, Last Rate: 400 mL/hr at 07/01/19 0935, 500 mg at 07/01/19 0935    LORazepam (ATIVAN) 2 mg/mL injection 0 5 mg, 0 5 mg, Intravenous, Q6H PRN, Jalil Peña MD    metoclopramide (REGLAN) injection 10 mg, 10 mg, Intravenous, Q6H PRN, Jalil Peña MD    metroNIDAZOLE (FLAGYL) IVPB (premix) 500 mg, 500 mg, Intravenous, Q8H, Dayo Vance MD, Last Rate: 200 mL/hr at 07/01/19 1144, 500 mg at 07/01/19 1144    morphine (PF) 4 mg/mL injection 4 mg, 4 mg, Intravenous, Q4H, Jalil Peña MD, 4 mg at 07/01/19 1142    morphine (PF) 4 mg/mL injection 4 mg, 4 mg, Intravenous, Q2H PRN, Jalil Peña MD    multi-electrolyte (ISOLYTE-S PH 7 4 equivalent) IV solution, 75 mL/hr, Intravenous, Continuous, Dayo Vance MD, Last Rate: 75 mL/hr at 07/01/19 1028, 75 mL/hr at 07/01/19 1028    ondansetron (ZOFRAN) 8 mg in sodium chloride 0 9 % 50 mL IVPB, 8 mg, Intravenous, Q8H, Jalil Peña MD, Last Rate: 200 mL/hr at 07/01/19 1143, 8 mg at 07/01/19 1143    pantoprazole (PROTONIX) injection 40 mg, 40 mg, Intravenous, Q24H [address], Dayo Vance MD    potassium chloride (K-DUR,KLOR-CON) CR tablet 60 mEq, 60 mEq, Oral, Once, Favian Foote MD    saccharomyces boulardii (FLORASTOR) capsule 250 mg, 250 mg, Oral, BID, Favian Foote MD, 250 mg at 07/01/19 0923    senna (SENOKOT) tablet 25 8 mg, 3 tablet, Oral, HS, Favian Foote MD, 25 8 mg at 06/30/19 0026    vancomycin (VANCOCIN) IVPB (premix) 750 mg, 17 5 mg/kg, Intravenous, Q12H, Krista Jones MD, Last Rate: 150 mL/hr at 07/01/19 0053, 750 mg at 07/01/19 0053    REVIEW OF SYSTEMS     Review of Systems   Constitutional: Negative  HENT: Negative  Eyes: Negative  Respiratory: Positive for chest tightness  Cardiovascular: Negative  Gastrointestinal: Negative  Endocrine: Negative  Genitourinary: Negative  Musculoskeletal: Negative  Skin: Negative  Allergic/Immunologic: Negative  Neurological: Negative  Hematological: Negative  All other systems reviewed and are negative  OBJECTIVE     Current Weight: Weight - Scale: 44 9 kg (98 lb 15 8 oz)  Vitals:    07/01/19 0805   BP:    Pulse:    Resp:    Temp:    SpO2: 96%     Body mass index is 18 1 kg/m²  Intake/Output Summary (Last 24 hours) at 7/1/2019 1200  Last data filed at 6/30/2019 1808  Gross per 24 hour   Intake --   Output 400 ml   Net -400 ml       PHYSICAL EXAMINATION     Physical Exam   Constitutional: She is oriented to person, place, and time  She appears well-developed and well-nourished  HENT:   Head: Normocephalic and atraumatic  Eyes: Pupils are equal, round, and reactive to light  Neck: Neck supple  Cardiovascular: Normal rate, regular rhythm and normal heart sounds  Pulmonary/Chest: Effort normal    Abdominal: Soft  Bowel sounds are normal    Musculoskeletal: Normal range of motion  Neurological: She is alert and oriented to person, place, and time  Skin: Skin is warm  Psychiatric: She has a normal mood and affect           LAB RESULTS        Results from last 7 days   Lab Units 06/30/19  1156 06/30/19  0518 06/29/19  0642 06/28/19  1917   WBC Thousand/uL  --  7 65 16 49* 15 21*   HEMOGLOBIN g/dL  --  12 1 11 8 12 1   HEMATOCRIT % --  35 5 36 3 36 2   PLATELETS Thousands/uL  --  376 373 400*   POTASSIUM mmol/L 2 7*  2 7* 2 6* 2 7* 3 4*   CHLORIDE mmol/L 98* 97* 101 98*   CO2 mmol/L 28 27 28 28   BUN mg/dL 11 9 8 10   CREATININE mg/dL 0 59* 0 61 0 60 0 72   EGFR ml/min/1 73sq m 99 97 98 90   CALCIUM mg/dL 9 8 9 6 9 6 10 1   MAGNESIUM mg/dL  --  1 7  --  1 4*   PHOSPHORUS mg/dL 2 2*  --   --   --            RADIOLOGY RESULTS     Results for orders placed during the hospital encounter of 06/17/19   XR chest portable    Narrative CHEST     INDICATION:   Status post port placement  COMPARISON:  6/19/2019    EXAM PERFORMED/VIEWS:  XR CHEST PORTABLE      FINDINGS:  Right-sided infusion port catheter device is present in good position  There are surgical clips over the right side of the chest and in the right axilla  Cardiomediastinal silhouette appears unremarkable  Nodular opacities are seen in both lower lateral lung fields, stable from the recent prior  Previously, there was a left-sided hydropneumothorax base  The gas component is no longer confidently identified  There is residual left pleural fluid present  No definitive evidence of right-sided effusion  Osseous structures appear within normal limits for patient age  Impression No evidence of right-sided pneumothorax after infusion port catheter placement  Positioning appears satisfactory  Persistent stable bibasilar lung nodularity  There is left pleural effusion but there is no longer any visible left pneumothorax component  Workstation performed: [id number]       Results for orders placed during the hospital encounter of 06/28/19   XR chest 2 views    Narrative CHEST     INDICATION:   cough, persistent vomiting  COMPARISON:  June 21, 2019    EXAM PERFORMED/VIEWS:  XR CHEST PA & LATERAL  Images: 2    FINDINGS:  The tip of the right internal jugular port remains in the superior vena cava      Cardiomediastinal silhouette appears unremarkable  Abnormal infiltrates are now visible on the right upper and right middle lobes  These were not seen previously  In the appropriate clinical setting, this would be consistent with pneumonia  The nodules at the right base are obscured by the current infiltrate  The nodules at the left base are much less distinct  There may be some developing infiltrate at the left base as well  Impression Bilateral multilobar pneumonia        Workstation performed: [id number]       No results found for this or any previous visit  No results found for this or any previous visit  No results found for this or any previous visit  No results found for this or any previous visit  PLAN / RECOMMENDATIONS      60-year-old female with past medical history of metastatic esophageal cancer, seizure disorder who presents with 24 history of intractable nausea and vomiting and is found to have hypokalemia  1  Hypokalemia:  Persistently low potassium level secondary to inability to be able to replace them via IV or p o  Route  Her total requirements of potassium is close to 150 mEq in a given day  Patient agreeable to take liquid potassium  Also recommend discontinuation of dextrose based fluids as it may Lower Potassium further  Consider administering IV Plasmalyte or IV lactated Ringer which carries potassium in it  Thank you for the consultation to participate in patient's care  I have personally discussed my plan with the referring physician       Sandy Pina MD    7/1/2019

## 2019-07-01 NOTE — PROGRESS NOTES
Vancomycin Assessment    Mane Lugo is a 60 y o  female who is currently receiving vancomycin 750 mg q12h for Pneumonia     Relevant clinical data and objective history reviewed:  Creatinine   Date Value Ref Range Status   07/01/2019 0 57 (L) 0 60 - 1 30 mg/dL Final     Comment:     Standardized to IDMS reference method   06/30/2019 0 59 (L) 0 60 - 1 30 mg/dL Final     Comment:     Standardized to IDMS reference method   06/30/2019 0 61 0 60 - 1 30 mg/dL Final     Comment:     Standardized to IDMS reference method     /89   Pulse 84   Temp 98 6 °F (37 °C)   Resp 18   Ht 5' 2" (1 575 m)   Wt 44 9 kg (98 lb 15 8 oz)   SpO2 98%   BMI 18 10 kg/m²   I/O last 3 completed shifts: In: 1556 3 [I V :1556 3]  Out: 400 [Urine:300; Emesis/NG output:100]  Lab Results   Component Value Date/Time    BUN 11 07/01/2019 01:11 PM    WBC 12 38 (H) 07/01/2019 01:11 PM    HGB 11 2 (L) 07/01/2019 01:11 PM    HCT 32 8 (L) 07/01/2019 01:11 PM    MCV 93 07/01/2019 01:11 PM     07/01/2019 01:11 PM     Temp Readings from Last 3 Encounters:   07/01/19 98 6 °F (37 °C)   06/28/19 98 °F (36 7 °C) (Oral)   06/26/19 98 1 °F (36 7 °C) (Oral)     Vancomycin Days of Therapy: 3    Assessment/Plan  The patient is currently on vancomycin utilizing scheduled dosing  Baseline risks associated with therapy include: none   The patient is receiving 750 mg q12h with the most recent vancomycin level being at steady-state and sub-therapeutic based on a goal of 15-20 (appropriate for most indications) ; therefore, after clinical evaluation will be changed to 750 mg q8h   Pharmacy will continue to follow closely for s/sx of nephrotoxicity, infusion reactions and appropriateness of therapy  BMP and CBC will be ordered per protocol  Plan for trough as patient approaches steady state, prior to the 4th  dose at approximately 7/2-1300  Pharmacy will continue to follow the patients culture results and clinical progress daily      Shen Bautista Luana, Pharmacist

## 2019-07-01 NOTE — CONSULTS
Consulted for malnutrition  Initial assessment completed 6/29/19 with severe, chronic malnutrition diagnosis documented  Malnutrition form placed in chart on this date as well  Please see nutrition note and malnutrition form dated 6/29 for further details  Nutrition to continue to monitor

## 2019-07-01 NOTE — PLAN OF CARE
Problem: Potential for Falls  Goal: Patient will remain free of falls  Description  INTERVENTIONS:  - Assess patient frequently for physical needs  -  Identify cognitive and physical deficits and behaviors that affect risk of falls    -  [place] fall precautions as indicated by assessment   - Educate patient/family on patient safety including physical limitations  - Instruct patient to call for assistance with activity based on assessment  - Modify environment to reduce risk of injury  - Consider OT/PT consult to assist with strengthening/mobility  Outcome: Progressing     Problem: PAIN - ADULT  Goal: Verbalizes/displays adequate comfort level or baseline comfort level  Description  Interventions:  - Encourage patient to monitor pain and request assistance  - Assess pain using appropriate pain scale  - Administer analgesics based on type and severity of pain and evaluate response  - Implement non-pharmacological measures as appropriate and evaluate response  - Consider cultural and social influences on pain and pain management  - Notify physician/advanced practitioner if interventions unsuccessful or patient reports new pain  Outcome: Progressing     Problem: INFECTION - ADULT  Goal: Absence or prevention of progression during hospitalization  Description  INTERVENTIONS:  - Assess and monitor for signs and symptoms of infection  - Monitor lab/diagnostic results  - Monitor all insertion sites, i e  indwelling lines, tubes, and drains  - Monitor endotracheal (as able) and nasal secretions for changes in amount and color  - [place] appropriate cooling/warming therapies per order  - Administer medications as ordered  - Instruct and encourage patient and family to use good hand hygiene technique  - Identify and instruct in appropriate isolation precautions for identified infection/condition  Outcome: Progressing     Problem: SAFETY ADULT  Goal: Patient will remain free of falls  Description  INTERVENTIONS:  - Assess patient frequently for physical needs  -  Identify cognitive and physical deficits and behaviors that affect risk of falls  -  [place] fall precautions as indicated by assessment   - Educate patient/family on patient safety including physical limitations  - Instruct patient to call for assistance with activity based on assessment  - Modify environment to reduce risk of injury  - Consider OT/PT consult to assist with strengthening/mobility  Outcome: Progressing     Problem: DISCHARGE PLANNING  Goal: Discharge to home or other facility with appropriate resources  Description  INTERVENTIONS:  - Identify barriers to discharge w/patient and caregiver  - Arrange for needed discharge resources and transportation as appropriate  - Identify discharge learning needs (meds, wound care, etc )  - Arrange for interpretive services to assist at discharge as needed  - Refer to Case Management Department for coordinating discharge planning if the patient needs post-hospital services based on physician/advanced practitioner order or complex needs related to functional status, cognitive ability, or social support system  Outcome: Progressing     Problem: Knowledge Deficit  Goal: Patient/family/caregiver demonstrates understanding of disease process, treatment plan, medications, and discharge instructions  Description  Complete learning assessment and assess knowledge base    Interventions:  - Provide teaching at level of understanding  - Provide teaching via preferred learning methods  Outcome: Progressing     Problem: RESPIRATORY - ADULT  Goal: Achieves optimal ventilation and oxygenation  Description  INTERVENTIONS:  - Assess for changes in respiratory status  - Assess for changes in mentation and behavior  - Position to facilitate oxygenation and minimize respiratory effort  - Oxygen administration by appropriate delivery method based on oxygen saturation (per order) or ABGs  - Initiate smoking cessation education as indicated  - Encourage broncho-pulmonary hygiene including cough, deep breathe, Incentive Spirometry  - Assess the need for suctioning and aspirate as needed  - Assess and instruct to report SOB or any respiratory difficulty  - Respiratory Therapy support as indicated  Outcome: Progressing     Problem: Nutrition/Hydration-ADULT  Goal: Nutrient/Hydration intake appropriate for improving, restoring or maintaining nutritional needs  Description  Monitor and assess patient's nutrition/hydration status for malnutrition (ex- brittle hair, bruises, dry skin, pale skin and conjunctiva, muscle wasting, smooth red tongue, and disorientation)  Collaborate with interdisciplinary team and initiate plan and interventions as ordered  Monitor patient's weight and dietary intake as ordered or per policy  Determine patient's food preferences and provide high-protein, high-caloric foods as appropriate       INTERVENTIONS:  - Monitor oral intake, urinary output, labs, and treatment plans  - Assess nutrition and hydration status and recommend course of action  - Evaluate amount of meals eaten  - Assist patient with eating if necessary   - Allow adequate time for meals  - Recommend/ encourage appropriate diets, oral nutritional supplements, and vitamin/mineral supplements  - Order, calculate, and assess calorie counts as needed  - Recommend, monitor, and adjust tube feedings and TPN/PPN based on assessed needs  - Assess need for intravenous fluids  - Provide nutrition/hydration education as appropriate  - Include patient/family/caregiver in decisions related to nutrition   Outcome: Progressing     Problem: Prexisting or High Potential for Compromised Skin Integrity  Goal: Skin integrity is maintained or improved  Description  INTERVENTIONS:  - Identify patients at risk for skin breakdown  - Assess and monitor skin integrity  - Assess and monitor nutrition and hydration status  - Monitor labs (i e  albumin)  - Assess for incontinence   - Turn and reposition patient  - Assist with mobility/ambulation  - Relieve pressure over bony prominences  - Avoid friction and shearing  - Provide appropriate hygiene as needed including keeping skin clean and dry  - Evaluate need for skin moisturizer/barrier cream  - Collaborate with interdisciplinary team (i e  Nutrition, Rehabilitation, etc )   - Patient/family teaching  Outcome: Progressing

## 2019-07-02 NOTE — PROGRESS NOTES
Progress Note - Infectious Disease   Donnal Zaria 60 y o  female MRN: [id number]  Unit/Bed#: -01 Encounter: [id number]      Impression/Recommendations:  1  Multifocal pneumonia, involving right side  Given intractable nausea and vomiting, this is most likely aspiration pneumonia  Patient is currently clinically improved on IV antibiotics  Given multiple healthcare contact, patient has risk of resistant GNR  However, risk of MRSA pneumonia is not high  Patient is systemically well, without sepsis or systemic toxicity  Continue IV cefepime  Continue Flagyl but change back to IV  Monitor temperature/WBC  Monitor respiratory symptoms      2  Acute hypoxic respiratory failure, secondary to pneumonia above  Patient is clinically improved  O2 support is minimal now  Antibiotic plan as in above  O2 support per primary service      3  Intractable nausea and vomiting, most likely chemotherapy induced  Symptoms are well controlled now  Patient will need to discuss with her oncologist regarding any chemotherapy modification to prevent future symptoms  Nausea worth of p o  Flagyl  Will change to IV, as in above  Symptom management per primary service      4  Moderate protein calorie malnutrition      5  Metastatic esophageal carcinoma, on XRT and chemotherapy  Patient is currently not neutropenic      Discussed with the patient in detail regarding the above plan  Antibiotics:  Cefepime/Flagyl # 4    Subjective:  Patient continues to have nausea with pills, especially Flagyl  No vomiting at present  No abdominal pain  No diarrhea  Dyspnea mild  Temperature stays down  No chills      Objective:  Vitals:  Temp:  [98 1 °F (36 7 °C)-99 5 °F (37 5 °C)] 99 5 °F (37 5 °C)  HR:  [79-94] 94  Resp:  [17-20] 19  BP: (113-137)/(73-92) 113/82  SpO2:  [89 %-95 %] 93 %  Temp (24hrs), Av 9 °F (37 2 °C), Min:98 1 °F (36 7 °C), Max:99 5 °F (37 5 °C)  Current: Temperature: 99 5 °F (37 5 °C)    Physical Exam:     General: Awake, alert, cooperative, no distress  Neck:  Supple  No mass  No lymphadenopathy  Lungs: Decreased breath sounds, stable right basilar rales, no wheezing, respirations unlabored  Heart:  Tachycardic with regular rhythm, S1 and S2 normal, no murmur  Abdomen: Soft, nondistended, non-tender, bowel sounds active all four quadrants,        no masses, no organomegaly  Extremities: No edema  No erythema/warmth  No ulcer  Nontender to palpation  Skin:  No rash  Neuro: Moves all extremities  Invasive Devices     Central Venous Catheter Line            Port A Cath 06/21/19 Right Chest 11 days                Labs studies:   I have personally reviewed pertinent labs  Results from last 7 days   Lab Units 07/02/19  1345 07/02/19  0513 07/01/19  1311  06/30/19  0518  06/28/19  1917   POTASSIUM mmol/L 2 7* 1 9* 2 0*   < > 2 6*   < > 3 4*   CHLORIDE mmol/L 100 98* 99*   < > 97*   < > 98*   CO2 mmol/L 36* 33* 31   < > 27   < > 28   BUN mg/dL 11 11 11   < > 9   < > 10   CREATININE mg/dL 0 57* 0 60 0 57*   < > 0 61   < > 0 72   EGFR ml/min/1 73sq m 100 98 100   < > 97   < > 90   CALCIUM mg/dL 9 4 9 3 9 6   < > 9 6   < > 10 1   AST U/L  --   --   --   --  27  --  14   ALT U/L  --   --   --   --  8*  --  8*   ALK PHOS U/L  --   --   --   --  76  --  84    < > = values in this interval not displayed  Results from last 7 days   Lab Units 07/02/19  0513 07/01/19  1311 06/30/19  0518   WBC Thousand/uL 13 47* 12 38* 7 65   HEMOGLOBIN g/dL 11 0* 11 2* 12 1   PLATELETS Thousands/uL 267 281 376           Imaging Studies:   I have personally reviewed pertinent imaging study reports and images in PACS  EKG, Pathology, and Other Studies:   I have personally reviewed pertinent reports

## 2019-07-02 NOTE — PROGRESS NOTES
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 Making her completely comfortable  Given her current status, I doubt that TPN would be a realistic long-term answer  Would wait for palliative service to discussed with patient as well  Intractable nausea and vomiting  Assessment & Plan  Secondary to esophageal cancer  She is on scheduled antiemetics now  Hypokalemia  Assessment & Plan  Being repleted  Patient's port was accessed as staff is running out of IV sites now    Acute respiratory failure with hypoxia [facility])  Assessment & Plan  Secondary to multifocal pneumonia/aspiration pneumonia/pneumonitis  Continue with current treatment  Moderate protein-calorie malnutrition (HCC)  Assessment & Plan  Malnutrition Findings:   Malnutrition type: Chronic illness  Degree of Malnutrition: Other severe protein calorie malnutritionAlbumin 2 7    BMI Findings:  BMI Classifications: Underweight < 18 5     Body mass index is 18 1 kg/m²  Not keeping anything down orally  ? 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 ([address] )   Multifocal pneumonia   Cancer associated pain   Gastro-esophageal reflux disease without esophagitis   Moderate protein-calorie malnutrition ([address] )   Acute respiratory failure with hypoxia (HCC)   Hypokalemia   Intractable nausea and vomiting            VTE Pharmacologic Prophylaxis:   Pharmacologic: Enoxaparin (Lovenox)  Mechanical VTE Prophylaxis in Place: Yes    Patient Centered Rounds: I have performed bedside rounds with nursing staff today  Discussions with Specialists or Other Care Team Provider:  Yes    Education and Discussions with Family / Patient:  Yes    Time Spent for Care: 33+ minutes  More than 50% of total time spent on counseling and coordination of care as described above  Current Length of Stay: 3 day(s)    Current Patient Status: Inpatient   Certification Statement: The patient will continue to require additional inpatient hospital stay due to Intractable nausea and vomiting/aspiration pneumonia    Discharge Plan: To be determined    Code Status: Level 3 - DNAR and DNI      Subjective:   Patient started eating some food and now she is throwing up  She cannot keep anything down  She is getting potassium through IV in addition to other antibiotics/fluids/medications  Objective:     Vitals:   Temp (24hrs), Av 7 °F (37 1 °C), Min:98 1 °F (36 7 °C), Max:99 5 °F (37 5 °C)    Temp:  [98 1 °F (36 7 °C)-99 5 °F (37 5 °C)] 98 6 °F (37 °C)  HR:  [79-94] 80  Resp:  [17-20] 17  BP: (114-137)/(73-92) 117/73  SpO2:  [85 %-98 %] 93 %  Body mass index is 18 1 kg/m²  Input and Output Summary (last 24 hours): Intake/Output Summary (Last 24 hours) at 2019 1338  Last data filed at 2019 0900  Gross per 24 hour   Intake 400 ml   Output 200 ml   Net 200 ml           Physical Exam:     Vital signs are reviewed as above  Constitutional:  Chronically sick appearing 60years old female who looks older than her age  She is basically throwing up quite a bit today    Eyes: EOM grossly intact  Conjunctivae are pale  HENT: Oropharynx are dry  Neck: Neck is supple  Cardiac: I did not hear any rubs or gallop   Patient appears to be in sinus rhythm  Respiratory: Patient not in significant respiratory distress  Air entry in general is poor with coarse crackles anterolaterally bilaterally  GI: Abdomen is soft  It is grossly nontender  Bowel sounds are adequate  I was not able to appreciate any hepatosplenomegaly  Neurologic:  Patient is awake and alert  Neurological examination is grossly intact  No obvious focal neurological deficit noticed  Skin: Skin is warm and dry  Skin is also pale  Psychiatric: Mood and affect are pleasant  Musculoskeletal   Moving her arms  She has easily visible muscle tendons/bones  Ribs are also easily visible  Prominent clavicles  Has significant muscle wasting in general    Extremities: Patient has no significant cyanosis, clubbing, or lower extremity edema       Additional Data:     Labs:    Results from last 7 days   Lab Units 07/02/19  0513   WBC Thousand/uL 13 47*   HEMOGLOBIN g/dL 11 0*   HEMATOCRIT % 32 8*   PLATELETS Thousands/uL 267   NEUTROS PCT % 94*   LYMPHS PCT % 3*   MONOS PCT % 2*   EOS PCT % 0     Results from last 7 days   Lab Units 07/02/19  0513  06/30/19  0518   POTASSIUM mmol/L 1 9*   < > 2 6*   CHLORIDE mmol/L 98*   < > 97*   CO2 mmol/L 33*   < > 27   BUN mg/dL 11   < > 9   CREATININE mg/dL 0 60   < > 0 61   CALCIUM mg/dL 9 3   < > 9 6   ALK PHOS U/L  --   --  76   ALT U/L  --   --  8*   AST U/L  --   --  27    < > = values in this interval not displayed  * I Have Reviewed All Lab Data Listed Above  * Additional Pertinent Lab Tests Reviewed:  All Labs Within Last 24 Hours Reviewed        Recent Cultures (last 7 days):           Last 24 Hours Medication List:     Current Facility-Administered Medications:  al mag oxide-diphenhydramine-lidocaine viscous 10 mL Swish & Swallow Q6H PRN Favian Foote MD    albuterol 2 5 mg Nebulization Q6H PRN Kendy Hameed MD    cefepime 1,000 mg Intravenous Q12H Poli Chester MD Last Rate: 1,000 mg (07/01/19 0145)   dexamethasone 4 mg Intravenous BID (AM & Afternoon) Marc Francois MD    enoxaparin 40 mg Subcutaneous Daily Favian Foote MD    famotidine 20 mg Oral Daily Favian Foote MD    gabapentin 600 mg Oral BID Favian Foote MD    guaiFENesin 600 mg Oral Q12H [address] Favian Foote MD    ipratropium 0 5 mg Nebulization TID Karin Andre MD    levalbuterol 1 25 mg Nebulization TID Karin Andre MD    levETIRAcetam 500 mg Intravenous Q12H [address] Karin Andre MD Last Rate: 500 mg (07/02/19 0845)   LORazepam 0 5 mg Intravenous Q6H PRN Marc Francois MD    metroNIDAZOLE 500 mg Intravenous Q8H Aracelis Gordon PA-C Last Rate: 500 mg (07/02/19 0646)   morphine injection 5 mg Intravenous Q4H Marc Francois MD    morphine injection 6 mg Intravenous Q2H PRN Marc Francois MD    ondansetron 8 mg Intravenous Q8H Marc Francois MD Last Rate: 8 mg (07/02/19 1123)   pantoprazole 40 mg Intravenous Q12H [address] Skye Coppola PA-C    potassium chloride 20 mEq Oral 6x Daily Belem Blankenship MD    promethazine 25 mg Intravenous Q6H PRN Marc Francois MD    saccharomyces boulardii 250 mg Oral BID Erendira Howe MD    senna 3 tablet Oral HS Erendira Howe MD         Today, Patient Was Seen By: Perla Rocha MD    ** Please Note: Dragon 360 Dictation voice to text software may have been used in the creation of this document   **

## 2019-07-02 NOTE — UTILIZATION REVIEW
Continued Stay Review    Date: 7/2                         Current Patient Class: IP   Current Level of Care: MS     HPI:64 y o  female initially admitted on  IP 6/29 @ 1124    Assessment/Plan: 59 yo female  W/ esophageal cancer , unable to keep anything down   Antiemetics prn   Acute resp failure w/ hypoxia sec to multifocal pneumonia   High risk for aspiration , cont treatment       7/2 GI note   Metastatic Esophageal Cancer s/p Esophagectomy and Gastric Pull Through  - Suspect her symptoms are chemotherapy induced as they occurred on Thursday in the middle of her chemotherapy cycle (Wed-Fri last week) and she reports similar symptoms when receiving chemotherapy at the time of her initial diagnosis,- Continue scheduled zofran, switch to phenergan IV PRN instead of reglan, discussed with palliative,- Clinically improving, able to tolerate some of a fruit cup and reported improvement in nausea    Pertinent Labs/Diagnostic Results:   Results from last 7 days   Lab Units 07/02/19  0513 07/01/19  1311 06/30/19  0518 06/29/19  0642 06/28/19  1917   WBC Thousand/uL 13 47* 12 38* 7 65 16 49* 15 21*   HEMOGLOBIN g/dL 11 0* 11 2* 12 1 11 8 12 1   HEMATOCRIT % 32 8* 32 8* 35 5 36 3 36 2   PLATELETS Thousands/uL 267 281 376 373 400*   NEUTROS ABS Thousands/µL 12 71* 11 81* 7 16  --   --    BANDS PCT %  --   --   --  4 4     Results from last 7 days   Lab Units 07/02/19  1345 07/02/19  0513 07/01/19  1311 06/30/19  1156 06/30/19  0518  06/28/19  1917   SODIUM mmol/L 140 141 141 137 135*   < > 138   POTASSIUM mmol/L 2 7* 1 9* 2 0* 2 7*  2 7* 2 6*   < > 3 4*   CHLORIDE mmol/L 100 98* 99* 98* 97*   < > 98*   CO2 mmol/L 36* 33* 31 28 27   < > 28   ANION GAP mmol/L 4 10 11 11 11   < > 12   BUN mg/dL 11 11 11 11 9   < > 10   CREATININE mg/dL 0 57* 0 60 0 57* 0 59* 0 61   < > 0 72   EGFR ml/min/1 73sq m 100 98 100 99 97   < > 90   CALCIUM mg/dL 9 4 9 3 9 6 9 8 9 6   < > 10 1   MAGNESIUM mg/dL  --   --   --   --  1 7  --  1 4* PHOSPHORUS mg/dL  --   --   --  2 2*  --   --   --     < > = values in this interval not displayed       Results from last 7 days   Lab Units 06/30/19  0518 06/28/19 1917   AST U/L 27 14   ALT U/L 8* 8*   ALK PHOS U/L 76 84   TOTAL PROTEIN g/dL 6 9 7 4   ALBUMIN g/dL 2 4* 2 7*   TOTAL BILIRUBIN mg/dL 0 70 0 40   BILIRUBIN DIRECT mg/dL  --  0 10     Results from last 7 days   Lab Units 07/02/19  1345 07/02/19  0513 07/01/19  1311 06/30/19  1156 06/30/19  0518 06/29/19  0642 06/28/19 1917   GLUCOSE RANDOM mg/dL 90 101 111 115 117 103 121     Results from last 7 days   Lab Units 06/30/19  0518 06/28/19 1917   LACTIC ACID mmol/L 1 5 1 2     Results from last 7 days   Lab Units 06/28/19  1902   CLARITY UA  Clear   COLOR UA  Yellow   SPEC GRAV UA  1 020   PH UA  6 5   GLUCOSE UA mg/dl Negative   KETONES UA mg/dl Negative   BLOOD UA  Negative   PROTEIN UA mg/dl Trace*   NITRITE UA  Negative   BILIRUBIN UA  Negative   UROBILINOGEN UA E U /dl 0 2   LEUKOCYTES UA  Negative   WBC UA /hpf 1-2*   RBC UA /hpf None Seen   BACTERIA UA /hpf Occasional   EPITHELIAL CELLS WET PREP /hpf Occasional   MUCUS THREADS  Occasional*     Vital Signs:  07/02/19 1413  --  --  --  --  93 %  Nasal cannula  --   07/02/19 1412  --  --  --  --  93 %  --  --   07/02/19 0806  --  80  --  117/73  93 %  --  --   07/02/19 0800  98 6 °F (37 °C)  80  17  117/73  92 %  --  --   07/02/19 0644  --  --  --  --  93 %  Nasal cannula  --   07/02/19 0236  98 8 °F (37 1 °C)  93  --  137/92  89 %Abnormal   --  --   07/02/19 0233  98 8 °F (37 1 °C)  84  18  137/92  95 %  --  --     Medications:   Scheduled Meds:   Current Facility-Administered Medications:  al mag oxide-diphenhydramine-lidocaine viscous 10 mL Swish & Swallow Q6H PRN     albuterol 2 5 mg Nebulization Q6H PRN     cefepime 1,000 mg Intravenous Q12H     dexamethasone 4 mg Intravenous BID (AM & Afternoon)     enoxaparin 40 mg Subcutaneous Daily     famotidine 20 mg Oral Daily     gabapentin 600 mg Oral BID     guaiFENesin 600 mg Oral Q12H MAGNOLIA     ipratropium 0 5 mg Nebulization TID     levalbuterol 1 25 mg Nebulization TID     levETIRAcetam 500 mg Intravenous Q12H [facility]     LORazepam 0 5 mg Intravenous Q6H PRN     metroNIDAZOLE 500 mg Intravenous Q8H     morphine injection 5 mg Intravenous Q4H     morphine injection 6 mg Intravenous Q2H PRN     ondansetron 8 mg Intravenous Q8H     pantoprazole 40 mg Intravenous Q12H [facility]     potassium chloride 20 mEq Oral 6x Daily     promethazine 25 mg Intravenous Q6H PRN     saccharomyces boulardii 250 mg Oral BID     senna 3 tablet Oral HS         Discharge Plan: TBD     ===========================================================Continued Stay Review    Date:  7/1                        Current Patient Class:    Current Level of Care: MS     HPI:64 y o  female initially admitted on IP 6/30 @ 1124    Assessment/Plan: 61 yo female admitted for intractable N/V , hx esophageal ca   Antiemetics adjusted by palliative care   GI consulted to r/o esophageal obstruction   Severe hypokalemia despite repletion , neprhology consulted  Requiring 4l O2 to keep sats in high 90 s   Pneumonia being treated w/ cefepime, vanco and flagyl     7/1 Nephrology consult   Hypokalemia:  Persistently low potassium level secondary to inability to be able to replace them via IV or p o  Route  Her total requirements of potassium is close to 150 mEq in a given day  Patient agreeable to take liquid potassium  Also recommend discontinuation of dextrose based fluids as it may Lower Potassium further  Consider administering IV Plasmalyte or IV lactated Ringer which carries potassium in it  7/1  Gi consult   She is a 61-year-old female with metastatic GE junction adenocarcinoma who started chemotherapy on Wednesday and has had severe abdominal pain nausea and vomiting which is likely chemotherapy induced  We have changed her Zofran and Reglan to scheduled  Ice chips and clears      7/1 Oncology consult esophageal squamous cell carcinoma  - transferring care from [facility] to [address] [place] [address] with dysphagia  - 7/2017: Christy Guzman showed mid esophagus squamous cell carcinoma, 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  - 6/2019:  PET-CT showed diffuse metastatic lesion, including bilateral lung pleural based lesion, diffuse lymphadenopathy, including mediastinum     - t palliative chemotherapy, with FOLFOX standard dose on 6/26    Plan - esophageal squamous cell carcinoma  - diffusely metastatic, received 1st chemotherapy  In general squamous cell carcinoma do not have a good response to the chemotherapy  With the extensive miss of metastatic disease, patient's poor ability, underlying condition, I think to consider hospice is not a bad option  I again emphasized with patient to consider the quality versus quantity  Patient feels nausea, she will consider all these options  - appreciate the input of palliative care, I agreed to have goal of care discussion in the future, but in this hospitalization  My personal recommendation for patient is to strongly consider hospice    7/1  ID consult   Multifocal pneumonia, involving right side  Given intractable nausea and vomiting, this is most likely aspiration pneumonia  Patient is currently clinically improved on IV antibiotics  Given multiple healthcare contact, patient has risk of resistant GNR  However, risk of MRSA pneumonia is not high  Patient is systemically well, without sepsis or systemic toxicity  , Continue IV cefepime  Continue Flagyl but can be transitioned to p o  Bree Barrientos No further need for vancomycin    I will discontinue      Pertinent Labs/Diagnostic Results:   Results from last 7 days   Lab Units 07/01/19  1311 06/30/19  0518 06/29/19  0642 06/28/19  1917   WBC Thousand/uL 12 38* 7 65 16 49* 15 21*   HEMOGLOBIN g/dL 11 2* 12 1 11 8 12 1   HEMATOCRIT % 32 8* 35 5 36 3 36 2   PLATELETS Thousands/uL 281 376 373 400*   NEUTROS ABS Thousands/µL 11 81* 7 16  --   --    BANDS PCT %  --   --  4 4     Results from last 7 days   Lab Units 07/01/19  1311 06/30/19  1156 06/30/19  0518  06/28/19  1917   SODIUM mmol/L 141 137 135*   < > 138   POTASSIUM mmol/L 2 0* 2 7*  2 7* 2 6*   < > 3 4*   CHLORIDE mmol/L 99* 98* 97*   < > 98*   CO2 mmol/L 31 28 27   < > 28   ANION GAP mmol/L 11 11 11   < > 12   BUN mg/dL 11 11 9   < > 10   CREATININE mg/dL 0 57* 0 59* 0 61   < > 0 72   EGFR ml/min/1 73sq m 100 99 97   < > 90   CALCIUM mg/dL 9 6 9 8 9 6   < > 10 1   MAGNESIUM mg/dL  --   --  1 7  --  1 4*   PHOSPHORUS mg/dL  --  2 2*  --   --   --     < > = values in this interval not displayed       Results from last 7 days   Lab Units 06/30/19  0518 06/28/19 1917   AST U/L 27 14   ALT U/L 8* 8*   ALK PHOS U/L 76 84   TOTAL PROTEIN g/dL 6 9 7 4   ALBUMIN g/dL 2 4* 2 7*   TOTAL BILIRUBIN mg/dL 0 70 0 40   BILIRUBIN DIRECT mg/dL  --  0 10     Results from last 7 days   Lab Units 07/01/19  1311 06/30/19  1156 06/30/19  0518 06/29/19  0642 06/28/19 1917   GLUCOSE RANDOM mg/dL 111 115 117 103 121     Results from last 7 days   Lab Units 06/30/19  0518 06/28/19 1917   LACTIC ACID mmol/L 1 5 1 2     Results from last 7 days   Lab Units 06/28/19  1902   CLARITY UA  Clear   COLOR UA  Yellow   SPEC GRAV UA  1 020   PH UA  6 5   GLUCOSE UA mg/dl Negative   KETONES UA mg/dl Negative   BLOOD UA  Negative   PROTEIN UA mg/dl Trace*   NITRITE UA  Negative   BILIRUBIN UA  Negative   UROBILINOGEN UA E U /dl 0 2   LEUKOCYTES UA  Negative   WBC UA /hpf 1-2*   RBC UA /hpf None Seen   BACTERIA UA /hpf Occasional   EPITHELIAL CELLS WET PREP /hpf Occasional   MUCUS THREADS  Occasional*       Vital Signs:   07/01/19 2332  99 5 °F (37 5 °C)  94  19  119/82  93 %  --  --   07/01/19 2112  --  --  --  --  91 %  --  --   07/01/19 2051  --  --  --  --  90 %  Nasal cannula  --   07/01/19 2017  --  --  --  --  --  Nasal cannula  --   07/01/19 1930  98 1 °F (36 7 °C)  79  20  114/74  95 %  Nasal cannula  -- 07/01/19 1530  98 2 °F (36 8 °C)  84  20  120/83  85 %Abnormal   --  --   07/01/19 1345  --  --  --  --  98 %  --  --   07/01/19 0805  --  --  --  --  96 %  Nasal cannula  --   07/01/19 0803  --  --  --  --  96 %  --  --   07/01/19 0730  --  84  18  131/89  96 %  --  --   07/01/19 0500  --  --  --  --  85 %Abnormal   None (Room air)   --   O2 Device: pt takes NC off at 07/01/19 0500   07/01/19 0300  98 6 °F (37 °C)  88  16  118/77  95 %  --  --         Medications:   Scheduled Meds:   Current Facility-Administered Medications:  al mag oxide-diphenhydramine-lidocaine viscous 10 mL Swish & Swallow Q6H PRN     albuterol 2 5 mg Nebulization Q6H PRN     cefepime 1,000 mg Intravenous Q12H     dexamethasone 4 mg Intravenous BID (AM & Afternoon)     enoxaparin 40 mg Subcutaneous Daily     famotidine 20 mg Oral Daily     gabapentin 600 mg Oral BID     guaiFENesin 600 mg Oral Q12H MAGNOLIA     ipratropium 0 5 mg Nebulization TID     levalbuterol 1 25 mg Nebulization TID     levETIRAcetam 500 mg Intravenous Q12H MAGNOLIA     LORazepam 0 5 mg Intravenous Q6H PRN     metroNIDAZOLE 500 mg Intravenous Q8H     morphine injection 5 mg Intravenous Q4H     morphine injection 6 mg Intravenous Q2H PRN     ondansetron 8 mg Intravenous Q8H     pantoprazole 40 mg Intravenous Q12H [address]     potassium chloride 20 mEq Oral 6x Daily     promethazine 25 mg Intravenous Q6H PRN     saccharomyces boulardii 250 mg Oral BID     senna 3 tablet Oral HS         Discharge Plan: TBD   ===========================================================      Continued Stay Review    Date:  6/30                       Current Patient Class: IP  Current Level of Care: MS     HPI:64 y o  female initially admitted on IP 6/29 @ 1124    Assessment/Plan: 59 yo female admitted for pneumonia, resp failure , esophageal ca   Plan to treat w/ antiemetics , pain control , consult oncology , ID , Gi and palliative care   O2 @ 4l , abx         Pertinent Labs/Diagnostic Results:   Results from last 7 days   Lab Units 06/30/19  0518 06/29/19  0642 06/28/19 1917   WBC Thousand/uL 7 65 16 49* 15 21*   HEMOGLOBIN g/dL 12 1 11 8 12 1   HEMATOCRIT % 35 5 36 3 36 2   PLATELETS Thousands/uL 376 373 400*   NEUTROS ABS Thousands/µL 7 16  --   --    BANDS PCT %  --  4 4       Results from last 7 days   Lab Units 06/30/19  1156 06/30/19  0518  06/28/19 1917   SODIUM mmol/L 137 135*   < > 138   POTASSIUM mmol/L 2 7*  2 7* 2 6*   < > 3 4*   CHLORIDE mmol/L 98* 97*   < > 98*   CO2 mmol/L 28 27   < > 28   ANION GAP mmol/L 11 11   < > 12   BUN mg/dL 11 9   < > 10   CREATININE mg/dL 0 59* 0 61   < > 0 72   EGFR ml/min/1 73sq m 99 97   < > 90   CALCIUM mg/dL 9 8 9 6   < > 10 1   MAGNESIUM mg/dL  --  1 7  --  1 4*   PHOSPHORUS mg/dL 2 2*  --   --   --     < > = values in this interval not displayed       Results from last 7 days   Lab Units 06/30/19  0518 06/28/19 1917   AST U/L 27 14   ALT U/L 8* 8*   ALK PHOS U/L 76 84   TOTAL PROTEIN g/dL 6 9 7 4   ALBUMIN g/dL 2 4* 2 7*   TOTAL BILIRUBIN mg/dL 0 70 0 40   BILIRUBIN DIRECT mg/dL  --  0 10       Results from last 7 days   Lab Units 06/30/19  1156 06/30/19  0518 06/29/19  0642 06/28/19 1917   GLUCOSE RANDOM mg/dL 115 117 103 121     Results from last 7 days   Lab Units 06/30/19  0518 06/28/19 1917   LACTIC ACID mmol/L 1 5 1 2     Results from last 7 days   Lab Units 06/28/19 1902   CLARITY UA  Clear   COLOR UA  Yellow   SPEC GRAV UA  1 020   PH UA  6 5   GLUCOSE UA mg/dl Negative   KETONES UA mg/dl Negative   BLOOD UA  Negative   PROTEIN UA mg/dl Trace*   NITRITE UA  Negative   BILIRUBIN UA  Negative   UROBILINOGEN UA E U /dl 0 2   LEUKOCYTES UA  Negative   WBC UA /hpf 1-2*   RBC UA /hpf None Seen   BACTERIA UA /hpf Occasional   EPITHELIAL CELLS WET PREP /hpf Occasional   MUCUS THREADS  Occasional*     Vital Signs:   06/30/19 2340  98 1 °F (36 7 °C)  99  18  133/87  81 %Abnormal   --  --   06/30/19 1909  97 9 °F (36 6 °C)  94  16  130/87  84 %Abnormal   --  -- 06/30/19 1514  97 2 °F (36 2 °C)Abnormal   86  18  125/84  97 %  --  Lying   06/30/19 1323  --  --  --  --  94 %  --  --   06/30/19 1111  97 2 °F (36 2 °C)Abnormal   85  22  129/86  92 %  --  Lying   06/30/19 0800  --  --  --  --  95 %  Nasal cannula  --   06/30/19 0757  --  --  --  --  96 %  Nasal cannula  --   06/30/19 0756  --  --  --  --  96 %  --  --   06/30/19 0709  97 7 °F (36 5 °C)  96  24Abnormal   128/85  96 %  --  Lying     Medications:   Scheduled Meds:   Current Facility-Administered Medications:  al mag oxide-diphenhydramine-lidocaine viscous 10 mL Swish & Swallow Q6H PRN     albuterol 2 5 mg Nebulization Q6H PRN     cefepime 1,000 mg Intravenous Q12H     dexamethasone 4 mg Intravenous BID (AM & Afternoon)     enoxaparin 40 mg Subcutaneous Daily     famotidine 20 mg Oral Daily     gabapentin 600 mg Oral BID     guaiFENesin 600 mg Oral Q12H MAGNOLIA     ipratropium 0 5 mg Nebulization TID     levalbuterol 1 25 mg Nebulization TID     levETIRAcetam 500 mg Intravenous Q12H MAGNOLIA     LORazepam 0 5 mg Intravenous Q6H PRN     metroNIDAZOLE 500 mg Intravenous Q8H     morphine injection 5 mg Intravenous Q4H     morphine injection 6 mg Intravenous Q2H PRN     ondansetron 8 mg Intravenous Q8H     pantoprazole 40 mg Intravenous Q12H [facility]     potassium chloride 20 mEq Oral 6x Daily     promethazine 25 mg Intravenous Q6H PRN     saccharomyces boulardii 250 mg Oral BID     senna 3 tablet Oral HS         Discharge Plan: TBD     Network Utilization Review Department  Phone: [phone]; Fax [phone]  [email or web address]  ATTENTION: Please call with any questions or concerns to [phone]  and carefully listen to the prompts so that you are directed to the right person  Send all requests for admission clinical reviews, approved or denied determinations and any other requests to fax [phone]   All voicemails are confidential

## 2019-07-02 NOTE — ASSESSMENT & PLAN NOTE
Malnutrition Findings:   Malnutrition type: Chronic illness  Degree of Malnutrition: Other severe protein calorie malnutritionAlbumin 2 7    BMI Findings:  BMI Classifications: Underweight < 18 5     Body mass index is 18 1 kg/m²  Still Not keeping anything down orally  I doubt that she would benefit from TPN

## 2019-07-02 NOTE — PLAN OF CARE
Problem: Potential for Falls  Goal: Patient will remain free of falls  Description  INTERVENTIONS:  - Assess patient frequently for physical needs  -  Identify cognitive and physical deficits and behaviors that affect risk of falls    -  [place] fall precautions as indicated by assessment   - Educate patient/family on patient safety including physical limitations  - Instruct patient to call for assistance with activity based on assessment  - Modify environment to reduce risk of injury  - Consider OT/PT consult to assist with strengthening/mobility  Outcome: Progressing     Problem: PAIN - ADULT  Goal: Verbalizes/displays adequate comfort level or baseline comfort level  Description  Interventions:  - Encourage patient to monitor pain and request assistance  - Assess pain using appropriate pain scale  - Administer analgesics based on type and severity of pain and evaluate response  - Implement non-pharmacological measures as appropriate and evaluate response  - Consider cultural and social influences on pain and pain management  - Notify physician/advanced practitioner if interventions unsuccessful or patient reports new pain  Outcome: Progressing     Problem: INFECTION - ADULT  Goal: Absence or prevention of progression during hospitalization  Description  INTERVENTIONS:  - Assess and monitor for signs and symptoms of infection  - Monitor lab/diagnostic results  - Monitor all insertion sites, i e  indwelling lines, tubes, and drains  - Monitor endotracheal (as able) and nasal secretions for changes in amount and color  - [place] appropriate cooling/warming therapies per order  - Administer medications as ordered  - Instruct and encourage patient and family to use good hand hygiene technique  - Identify and instruct in appropriate isolation precautions for identified infection/condition  Outcome: Progressing     Problem: SAFETY ADULT  Goal: Patient will remain free of falls  Description  INTERVENTIONS:  - Assess patient frequently for physical needs  -  Identify cognitive and physical deficits and behaviors that affect risk of falls  -  [place] fall precautions as indicated by assessment   - Educate patient/family on patient safety including physical limitations  - Instruct patient to call for assistance with activity based on assessment  - Modify environment to reduce risk of injury  - Consider OT/PT consult to assist with strengthening/mobility  Outcome: Progressing     Problem: DISCHARGE PLANNING  Goal: Discharge to home or other facility with appropriate resources  Description  INTERVENTIONS:  - Identify barriers to discharge w/patient and caregiver  - Arrange for needed discharge resources and transportation as appropriate  - Identify discharge learning needs (meds, wound care, etc )  - Arrange for interpretive services to assist at discharge as needed  - Refer to Case Management Department for coordinating discharge planning if the patient needs post-hospital services based on physician/advanced practitioner order or complex needs related to functional status, cognitive ability, or social support system  Outcome: Progressing     Problem: Knowledge Deficit  Goal: Patient/family/caregiver demonstrates understanding of disease process, treatment plan, medications, and discharge instructions  Description  Complete learning assessment and assess knowledge base    Interventions:  - Provide teaching at level of understanding  - Provide teaching via preferred learning methods  Outcome: Progressing     Problem: RESPIRATORY - ADULT  Goal: Achieves optimal ventilation and oxygenation  Description  INTERVENTIONS:  - Assess for changes in respiratory status  - Assess for changes in mentation and behavior  - Position to facilitate oxygenation and minimize respiratory effort  - Oxygen administration by appropriate delivery method based on oxygen saturation (per order) or ABGs  - Initiate smoking cessation education as indicated  - Encourage broncho-pulmonary hygiene including cough, deep breathe, Incentive Spirometry  - Assess the need for suctioning and aspirate as needed  - Assess and instruct to report SOB or any respiratory difficulty  - Respiratory Therapy support as indicated  Outcome: Progressing     Problem: Nutrition/Hydration-ADULT  Goal: Nutrient/Hydration intake appropriate for improving, restoring or maintaining nutritional needs  Description  Monitor and assess patient's nutrition/hydration status for malnutrition (ex- brittle hair, bruises, dry skin, pale skin and conjunctiva, muscle wasting, smooth red tongue, and disorientation)  Collaborate with interdisciplinary team and initiate plan and interventions as ordered  Monitor patient's weight and dietary intake as ordered or per policy  Determine patient's food preferences and provide high-protein, high-caloric foods as appropriate       INTERVENTIONS:  - Monitor oral intake, urinary output, labs, and treatment plans  - Assess nutrition and hydration status and recommend course of action  - Evaluate amount of meals eaten  - Assist patient with eating if necessary   - Allow adequate time for meals  - Recommend/ encourage appropriate diets, oral nutritional supplements, and vitamin/mineral supplements  - Order, calculate, and assess calorie counts as needed  - Recommend, monitor, and adjust tube feedings and TPN/PPN based on assessed needs  - Assess need for intravenous fluids  - Provide nutrition/hydration education as appropriate  - Include patient/family/caregiver in decisions related to nutrition   Outcome: Progressing     Problem: Prexisting or High Potential for Compromised Skin Integrity  Goal: Skin integrity is maintained or improved  Description  INTERVENTIONS:  - Identify patients at risk for skin breakdown  - Assess and monitor skin integrity  - Assess and monitor nutrition and hydration status  - Monitor labs (i e  albumin)  - Assess for incontinence   - Turn and reposition patient  - Assist with mobility/ambulation  - Relieve pressure over bony prominences  - Avoid friction and shearing  - Provide appropriate hygiene as needed including keeping skin clean and dry  - Evaluate need for skin moisturizer/barrier cream  - Collaborate with interdisciplinary team (i e  Nutrition, Rehabilitation, etc )   - Patient/family teaching  Outcome: Progressing

## 2019-07-02 NOTE — PROGRESS NOTES
The patient's Vancomycin therapy has been completed / discontinued  Thank you for allowing us to take part in this patient's care   Pharmacy will sign-off now; please call or re-consult if there are any questions

## 2019-07-02 NOTE — ASSESSMENT & PLAN NOTE
Potassium is again low    Would add more potassium in her IV fluids and also give her a Piggy bag IV potassium

## 2019-07-02 NOTE — PHYSICAL THERAPY NOTE
Physical Therapy Cancellation Note    PT order received  Chart review performed  At this time, PT evaluation cancelled as pt declining PT/OT evaluations 2* nausea & vomiting  RN Yvonne Epperson present and aware of such  PT will follow and evaluate as appropriate      Toshia Stafford, PT, DPT

## 2019-07-02 NOTE — PROGRESS NOTES
GI Progress Note - Elba Zaria 60 y o  female MRN: [id number]    Unit/Bed#: -01 Encounter: [id number]    Subjective: She appears much more comfortable today, is currently eating a fruit cup  Improvement in nausea today but still present  Objective:     Vitals: Blood pressure 117/73, pulse 80, temperature 98 6 °F (37 °C), resp  rate 17, height 5' 2" (1 575 m), weight 44 9 kg (98 lb 15 8 oz), SpO2 93 %  ,Body mass index is 18 1 kg/m²  Intake/Output Summary (Last 24 hours) at 7/2/2019 1329  Last data filed at 7/2/2019 0900  Gross per 24 hour   Intake 400 ml   Output 200 ml   Net 200 ml       Physical Exam:     General Appearance: Alert, appears chronically ill and cachectic  Lungs: Clear to auscultation bilaterally, no rales or rhonchi, no labored breathing/accessory muscle use  Heart: Regular rate and rhythm, S1, S2 normal, no murmur, click, rub or gallop  Abdomen: Soft, non-tender, non-distended; bowel sounds normal; no masses or no organomegaly  Extremities: No cyanosis, edema    Invasive Devices     Central Venous Catheter Line            Port A Cath 06/21/19 Right Chest 10 days                Lab Results:  Results from last 7 days   Lab Units 07/02/19  0513   WBC Thousand/uL 13 47*   HEMOGLOBIN g/dL 11 0*   HEMATOCRIT % 32 8*   PLATELETS Thousands/uL 267   NEUTROS PCT % 94*   LYMPHS PCT % 3*   MONOS PCT % 2*   EOS PCT % 0     Results from last 7 days   Lab Units 07/02/19  0513  06/30/19  0518   POTASSIUM mmol/L 1 9*   < > 2 6*   CHLORIDE mmol/L 98*   < > 97*   CO2 mmol/L 33*   < > 27   BUN mg/dL 11   < > 9   CREATININE mg/dL 0 60   < > 0 61   CALCIUM mg/dL 9 3   < > 9 6   ALK PHOS U/L  --   --  76   ALT U/L  --   --  8*   AST U/L  --   --  27    < > = values in this interval not displayed  Imaging Studies: I have personally reviewed pertinent imaging studies      Xr Chest 2 Views    Result Date: 6/29/2019  Impression: Bilateral multilobar pneumonia Workstation performed: [id number] Ct Abdomen Pelvis With Contrast    Result Date: 6/28/2019  Impression: 1  Redemonstrated pleural-based masses with stable erosive change within the left lateral 9th and 10th ribs compatible with metastasis  2   Redemonstrated bibasilar scarring and bronchiectatic changes with increased patchy opacity within the right lower and middle lobes which may represent superimposed pneumonia versus aspiration  3   Diffuse thickening of the colon compatible with nonspecific colitis  4   Stable 1 cm indeterminate left adrenal nodule   Workstation performed: [id number]       Assessment and Plan:     Nausea/Vomiting  Hx Metastatic Esophageal Cancer s/p Esophagectomy and Gastric Pull Through  - Suspect her symptoms are chemotherapy induced as they occurred on Thursday in the middle of her chemotherapy cycle (Wed-Fri last week) and she reports similar symptoms when receiving chemotherapy at the time of her initial diagnosis  - Continue scheduled zofran, switch to phenergan IV PRN instead of reglan, discussed with palliative  - Clinically improving, able to tolerate some of a fruit cup and reported improvement in nausea  - Defer further evaluation with EGD or barium swallow as her symptoms are improving and again are likely chemotherapy induced, may be exacerbated by flagyl/antibiotics  - Continue treatment of aspiration pnuemonia  - She did have an EGD in April 2019 which showed normal gastric pull through anatomy, biopsies were neg for malignancy at that time      The patient will be seen by Dr Desirae Stern

## 2019-07-02 NOTE — NUTRITION
Consider initiating standard TPN if patient wishes to pursue aggressive nutrition intervention  Recommend obtain current body weight  Nutrition services will continue to monitor

## 2019-07-02 NOTE — ASSESSMENT & PLAN NOTE
Continue with current treatment  Patient continues to have significant nausea and vomiting-making her high risk for aspiration

## 2019-07-02 NOTE — QUICK NOTE
Was paged by nursing staff regarding patient with intractable nausea and vomiting  Refusing PO Flagyl due to this, therefore will place patient back on IV Flagyl  Continue scheduled antiemetics including zofran and reglan through IV as well  Continue to monitor       Coreen Mcclure PA-C

## 2019-07-02 NOTE — PROGRESS NOTES
NEPHROLOGY PROGRESS NOTE    Patient: Myesha Baumann               Sex: female          DOA: 6/28/2019  6:09 PM   YOB: 1955        Age:  60 y o         LOS:  LOS: 3 days   7/2/2019    REASON FOR THE CONSULTATION:      Hypokalemia    SUBJECTIVE     Patient appears to be in moderate distress secondary to pain in left arm  Serum potassium noted to have dropped to 1 9  Patient could not tolerate oral potassium given liquid form either      CURRENT MEDICATIONS       Current Facility-Administered Medications:     al mag oxide-diphenhydramine-lidocaine viscous (MAGIC MOUTHWASH) suspension 10 mL, 10 mL, Swish & Swallow, Q6H PRN, Favian Foote MD, 10 mL at 06/30/19 0308    albuterol inhalation solution 2 5 mg, 2 5 mg, Nebulization, Q6H PRN, Shanae Morales MD    cefepime (MAXIPIME) 1,000 mg in dextrose 5 % 50 mL IVPB, 1,000 mg, Intravenous, Q12H, Beverly Buchanan MD, Last Rate: 100 mL/hr at 07/01/19 2338, 1,000 mg at 07/01/19 2338    enoxaparin (LOVENOX) subcutaneous injection 40 mg, 40 mg, Subcutaneous, Daily, Favian Foote MD, 40 mg at 07/02/19 0843    famotidine (PEPCID) tablet 20 mg, 20 mg, Oral, Daily, Favian Foote MD, 20 mg at 07/01/19 0926    gabapentin (NEURONTIN) capsule 600 mg, 600 mg, Oral, BID, Favian Foote MD, 600 mg at 07/01/19 1753    guaiFENesin (MUCINEX) 12 hr tablet 600 mg, 600 mg, Oral, Q12H [facility], Favian Foote MD, 600 mg at 07/01/19 2017    ipratropium (ATROVENT) 0 02 % inhalation solution 0 5 mg, 0 5 mg, Nebulization, TID, Shanae Morales MD, 0 5 mg at 07/02/19 0644    levalbuterol (Urbano Martin) inhalation solution 1 25 mg, 1 25 mg, Nebulization, TID, Shanae Morales MD, 1 25 mg at 07/02/19 0644    levETIRAcetam (KEPPRA) 500 mg in sodium chloride 0 9 % 100 mL IVPB, 500 mg, Intravenous, Q12H [facility], Shanae Morales MD, Last Rate: 400 mL/hr at 07/02/19 0845, 500 mg at 07/02/19 0845    LORazepam (ATIVAN) 2 mg/mL injection 0 5 mg, 0 5 mg, Intravenous, Q6H PRN, Krystle Phelps MD, 0 5 mg at 07/02/19 0951    metroNIDAZOLE (FLAGYL) IVPB (premix) 500 mg, 500 mg, Intravenous, Q8H, Kristine Sharp PA-C, Last Rate: 200 mL/hr at 07/02/19 0646, 500 mg at 07/02/19 0646    morphine (PF) 4 mg/mL injection 4 mg, 4 mg, Intravenous, Q4H, Alyssia Long MD, 4 mg at 07/02/19 1124    morphine (PF) 4 mg/mL injection 4 mg, 4 mg, Intravenous, Q2H PRN, Alyssia Long MD    multi-electrolyte (ISOLYTE-S PH 7 4 equivalent) IV solution, 75 mL/hr, Intravenous, Continuous, Pilo Ramirez MD, Last Rate: 75 mL/hr at 07/02/19 0646, 75 mL/hr at 07/02/19 0646    ondansetron (ZOFRAN) 8 mg in sodium chloride 0 9 % 50 mL IVPB, 8 mg, Intravenous, Q8H, Alyssia Long MD, Last Rate: 200 mL/hr at 07/02/19 1123, 8 mg at 07/02/19 1123    pantoprazole (PROTONIX) injection 40 mg, 40 mg, Intravenous, Q12H MAGNOLIA, Skye Coppola PA-C, 40 mg at 07/02/19 1124    potassium chloride 10 % oral solution 20 mEq, 20 mEq, Oral, 6x Daily, Renetta Shearer MD, 20 mEq at 07/01/19 1753    potassium chloride 20 mEq IVPB (premix), 20 mEq, Intravenous, Q2H, Daern Munoz MD, Last Rate: 50 mL/hr at 07/02/19 1123, 20 mEq at 07/02/19 1123    promethazine (PHENERGAN) injection 12 5 mg, 12 5 mg, Intravenous, Q6H PRN, Skye Coppola PA-C    saccharomyces boulardii (FLORASTOR) capsule 250 mg, 250 mg, Oral, BID, Favian Foote MD, 250 mg at 07/01/19 1753    senna (SENOKOT) tablet 25 8 mg, 3 tablet, Oral, HS, Favian Foote MD, 25 8 mg at 06/30/19 0026    REVIEW OF SYSTEMS     Review of Systems   Constitutional: Positive for fatigue  HENT: Negative  Eyes: Negative  Respiratory: Negative  Cardiovascular: Negative  Gastrointestinal: Positive for nausea and vomiting  Endocrine: Negative  Genitourinary: Negative  Musculoskeletal: Negative  Skin: Negative  Allergic/Immunologic: Negative  Neurological: Negative  Hematological: Negative  All other systems reviewed and are negative        OBJECTIVE     Current Weight: Weight - Scale: 44 9 kg (98 lb 15 8 oz)  Vitals:    07/02/19 0806   BP: 117/73   Pulse: 80   Resp:    Temp:    SpO2: 93%     Body mass index is 18 1 kg/m²  Intake/Output Summary (Last 24 hours) at 7/2/2019 1240  Last data filed at 7/2/2019 0900  Gross per 24 hour   Intake 400 ml   Output 200 ml   Net 200 ml       PHYSICAL EXAMINATION     Physical Exam   Constitutional: She is oriented to person, place, and time  Cachectic female   HENT:   Head: Normocephalic and atraumatic  Eyes: Pupils are equal, round, and reactive to light  Neck: Neck supple  Cardiovascular: Normal rate, regular rhythm and normal heart sounds  Pulmonary/Chest: Effort normal    Abdominal: Soft  Bowel sounds are normal    Musculoskeletal: Normal range of motion  Neurological: She is alert and oriented to person, place, and time  Skin: Skin is warm  Psychiatric: She has a normal mood and affect  LAB RESULTS     Results from last 7 days   Lab Units 07/02/19  0513 07/01/19  1311 06/30/19  1156 06/30/19  0518 06/29/19  0642 06/28/19  1917   WBC Thousand/uL 13 47* 12 38*  --  7 65 16 49* 15 21*   HEMOGLOBIN g/dL 11 0* 11 2*  --  12 1 11 8 12 1   HEMATOCRIT % 32 8* 32 8*  --  35 5 36 3 36 2   PLATELETS Thousands/uL 267 281  --  376 373 400*   POTASSIUM mmol/L 1 9* 2 0* 2 7*  2 7* 2 6* 2 7* 3 4*   CHLORIDE mmol/L 98* 99* 98* 97* 101 98*   CO2 mmol/L 33* 31 28 27 28 28   BUN mg/dL 11 11 11 9 8 10   CREATININE mg/dL 0 60 0 57* 0 59* 0 61 0 60 0 72   EGFR ml/min/1 73sq m 98 100 99 97 98 90   CALCIUM mg/dL 9 3 9 6 9 8 9 6 9 6 10 1   MAGNESIUM mg/dL  --   --   --  1 7  --  1 4*   PHOSPHORUS mg/dL  --   --  2 2*  --   --   --            RADIOLOGY RESULTS      Results for orders placed during the hospital encounter of 06/17/19   XR chest portable    Narrative CHEST     INDICATION:   Status post port placement      COMPARISON:  6/19/2019    EXAM PERFORMED/VIEWS:  XR CHEST PORTABLE      FINDINGS:  Right-sided infusion port catheter device is present in good position  There are surgical clips over the right side of the chest and in the right axilla  Cardiomediastinal silhouette appears unremarkable  Nodular opacities are seen in both lower lateral lung fields, stable from the recent prior  Previously, there was a left-sided hydropneumothorax base  The gas component is no longer confidently identified  There is residual left pleural fluid present  No definitive evidence of right-sided effusion  Osseous structures appear within normal limits for patient age  Impression No evidence of right-sided pneumothorax after infusion port catheter placement  Positioning appears satisfactory  Persistent stable bibasilar lung nodularity  There is left pleural effusion but there is no longer any visible left pneumothorax component  Workstation performed: [id number]       Results for orders placed during the hospital encounter of 06/28/19   XR chest 2 views    Narrative CHEST     INDICATION:   cough, persistent vomiting  COMPARISON:  June 21, 2019    EXAM PERFORMED/VIEWS:  XR CHEST PA & LATERAL  Images: 2    FINDINGS:  The tip of the right internal jugular port remains in the superior vena cava  Cardiomediastinal silhouette appears unremarkable  Abnormal infiltrates are now visible on the right upper and right middle lobes  These were not seen previously  In the appropriate clinical setting, this would be consistent with pneumonia  The nodules at the right base are obscured by the current infiltrate  The nodules at the left base are much less distinct  There may be some developing infiltrate at the left base as well        Impression Bilateral multilobar pneumonia        Workstation performed: [id number]         ASSESSMENT/PLAN     42-year-old female with past medical history of metastatic esophageal cancer status post esophagectomy, palliative chemotherapy who presented with intractable nausea and vomiting and found to have hypokalemia  1  Hypokalemia:  Etiology likely decreased p o  Intake along with fluid administration resulting in distal sodium delivery and hence efflux of potassium  Patient is currently getting runs of IV KCl having previously refused K dur and liquid potassium supplementation  Given current level of serum potassium level 1 9, her total potassium requirement may be close to 250 mEq in a given day  Recommend giving runs of IV KCl 20 mEq x 6 runs  Check serum potassium every 6 hours  Will DC Plasmalyte          Anh Marino MD  Nephrology  7/2/2019

## 2019-07-02 NOTE — PROGRESS NOTES
Progress note - Palliative and Supportive Care   Trae Vitale 60 y o  female [id number]    Assessment:     Patient Active Problem List   Diagnosis    Gastro-esophageal reflux disease without esophagitis    History of breast cancer    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 ([facility] [address] )    Seizure disorder ([facility] [address] )    Cancer associated pain    Cellulitis of chest wall    Therapeutic opioid-induced constipation (OIC)    Dehydration    Multifocal pneumonia    Moderate protein-calorie malnutrition (HCC)    Acute respiratory failure with hypoxia (HCC)    Hypokalemia    Intractable nausea and vomiting         Plan:  1  Symptom management -   - continued scheduled Zofran 8 mg every 8 hours  DC Reglan  Add on Phenergan 25 mg every 6 hours as needed  Will initiate dexamethasone as well 4 mg twice daily  - for pain, increase scheduled morphine to 5 mg every 4 hours IV, with a p r n  Dose 6 mg   - we will work to transition to oral pain medications once patient's nausea has further improved  - appreciate GI assistance  2  Goals - will work toward better symptom control  Hopefully will be able to have a goals of care conversation in the next few days  Appreciate the input of Dr Gissel Hernandez in Oncology       Interval history:       Patient reports her pain is slightly better than yesterday  She received eight doses of 4 mg of IV morphine in the past 24 hours  Patient reports her nausea is improved as well  She has received three doses of p r n  Reglan  She is seen eating for the 1st time in several days  Although she reports still feeling very weak ill, she does look much better today on exam and is in better spirits  She reports her  has been admitted to [facility]      MEDICATIONS / ALLERGIES:     all current active meds have been reviewed and current meds:   Current Facility-Administered Medications   Medication Dose Route Frequency    al mag oxide-diphenhydramine-lidocaine viscous (MAGIC MOUTHWASH) suspension 10 mL  10 mL Swish & Swallow Q6H PRN    albuterol inhalation solution 2 5 mg  2 5 mg Nebulization Q6H PRN    cefepime (MAXIPIME) 1,000 mg in dextrose 5 % 50 mL IVPB  1,000 mg Intravenous Q12H    dexamethasone (DECADRON) injection 4 mg  4 mg Intravenous BID (AM & Afternoon)    enoxaparin (LOVENOX) subcutaneous injection 40 mg  40 mg Subcutaneous Daily    famotidine (PEPCID) tablet 20 mg  20 mg Oral Daily    gabapentin (NEURONTIN) capsule 600 mg  600 mg Oral BID    guaiFENesin (MUCINEX) 12 hr tablet 600 mg  600 mg Oral Q12H MAGNOLIA    ipratropium (ATROVENT) 0 02 % inhalation solution 0 5 mg  0 5 mg Nebulization TID    levalbuterol (XOPENEX) inhalation solution 1 25 mg  1 25 mg Nebulization TID    levETIRAcetam (KEPPRA) 500 mg in sodium chloride 0 9 % 100 mL IVPB  500 mg Intravenous Q12H [address]    LORazepam (ATIVAN) 2 mg/mL injection 0 5 mg  0 5 mg Intravenous Q6H PRN    metroNIDAZOLE (FLAGYL) IVPB (premix) 500 mg  500 mg Intravenous Q8H    morphine (PF) 10 mg/mL injection 5 mg  5 mg Intravenous Q4H    morphine (PF) 10 mg/mL injection 6 mg  6 mg Intravenous Q2H PRN    ondansetron (ZOFRAN) 8 mg in sodium chloride 0 9 % 50 mL IVPB  8 mg Intravenous Q8H    pantoprazole (PROTONIX) injection 40 mg  40 mg Intravenous Q12H [address]    potassium chloride 10 % oral solution 20 mEq  20 mEq Oral 6x Daily    potassium chloride 20 mEq IVPB (premix)  20 mEq Intravenous Q2H    promethazine (PHENERGAN) injection 25 mg  25 mg Intravenous Q6H PRN    saccharomyces boulardii (FLORASTOR) capsule 250 mg  250 mg Oral BID    senna (SENOKOT) tablet 25 8 mg  3 tablet Oral HS       No Known Allergies    OBJECTIVE:    Physical Exam  Physical Exam   Constitutional: She is oriented to person, place, and time  No distress  frail and cachectic   HENT:   Head: Atraumatic    bitemporal wasting   Eyes: Right eye exhibits no discharge  Left eye exhibits no discharge     Pulmonary/Chest: Effort normal  No respiratory distress  Abdominal: She exhibits no distension  Musculoskeletal: She exhibits no edema  Neurological: She is alert and oriented to person, place, and time  Skin: Skin is warm and dry  She is not diaphoretic  Patient is less pale than yesterday   Psychiatric: She has a normal mood and affect  Her behavior is normal    Nursing note and vitals reviewed  Lab Results:   I have personally reviewed pertinent labs  , CBC:   Lab Results   Component Value Date    WBC 13 47 (H) 07/02/2019    HGB 11 0 (L) 07/02/2019    HCT 32 8 (L) 07/02/2019    MCV 95 07/02/2019     07/02/2019    MCH 31 9 07/02/2019    MCHC 33 5 07/02/2019    RDW 11 9 07/02/2019    MPV 8 9 07/02/2019    NRBC 0 07/02/2019   , CMP:   Lab Results   Component Value Date    SODIUM 141 07/02/2019    K 1 9 (LL) 07/02/2019    CL 98 (L) 07/02/2019    CO2 33 (H) 07/02/2019    BUN 11 07/02/2019    CREATININE 0 60 07/02/2019    CALCIUM 9 3 07/02/2019    EGFR 98 07/02/2019         Counseling / Coordination of Care  Total floor / unit time spent today 25+ minutes  Greater than 50% of total time was spent with the patient and / or family counseling and / or coordination of care  A description of the counseling / coordination of care:  Psychosocial support, patient assessment, discussion with GI team, medication changes including opioids and nausea medication

## 2019-07-02 NOTE — ASSESSMENT & PLAN NOTE
Secondary to multifocal pneumonia/aspiration pneumonia/pneumonitis  Continue with current treatment

## 2019-07-03 PROBLEM — J96.01 ACUTE RESPIRATORY FAILURE WITH HYPOXIA (HCC): Status: RESOLVED | Noted: 2019-01-01 | Resolved: 2019-01-01

## 2019-07-03 NOTE — PROGRESS NOTES
[facility] Medicine Progress Note  Patient: Fernando Chisholm 60 y o  female   MRN: [id number]  PCP: Olvin Demarco MD  Unit/Bed#: -[facility] Encounter: [id number]  Date Of Visit: 07/03/19          * 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 [facility])  Assessment & Plan  Major issue  She continued to poor with continue to nausea and vomiting  Palliative care input and help appreciated  ? Need for hospice    Intractable nausea and vomiting  Assessment & Plan  Secondary to esophageal cancer  She is on scheduled antiemetics now  Medications are being adjusted  Hypokalemia  Assessment & Plan  Still low and Being repleted  Continue to monitor    Moderate protein-calorie malnutrition (HCC)  Assessment & Plan  Malnutrition Findings:   Malnutrition type: Chronic illness  Degree of Malnutrition: Other severe protein calorie malnutritionAlbumin 2 7    BMI Findings:  BMI Classifications: Underweight < 18 5     Body mass index is 18 1 kg/m²  Still Not keeping anything down orally  ? Need for TPN  Multifocal pneumonia  Assessment & Plan  Continue with current treatment  Patient continues to have significant nausea and vomiting-making her high risk for aspiration  Cancer associated pain  Assessment & Plan  Continue with pain control    Seizure disorder [facility])  Assessment & Plan  Continue with IV Keppra    Gastro-esophageal reflux disease without esophagitis  Assessment & Plan  Continue with H2 blocker and PPI    Acute respiratory failure with hypoxia (HCC)-resolved as of 7/3/2019  Assessment & Plan  Secondary to multifocal pneumonia/aspiration pneumonia/pneumonitis  Continue with current treatment        Present on Admission:   Seizure disorder ([address] )   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 ([facility] [address] )   Multifocal pneumonia   Cancer associated pain   Gastro-esophageal reflux disease without esophagitis   Moderate protein-calorie malnutrition (HCC)   (Resolved) Acute respiratory failure with hypoxia (HCC)   Hypokalemia   Intractable nausea and vomiting            VTE Pharmacologic Prophylaxis:   Pharmacologic: Enoxaparin (Lovenox)  Mechanical VTE Prophylaxis in Place: Yes    Patient Centered Rounds: I have performed bedside rounds with nursing staff today  Discussions with Specialists or Other Care Team Provider:  Yes    Education and Discussions with Family / Patient:  Yes    Time Spent for Care: 20+ minutes  More than 50% of total time spent on counseling and coordination of care as described above  Current Length of Stay: 4 day(s)    Current Patient Status: Inpatient   Certification Statement: The patient will continue to require additional inpatient hospital stay due to Intractable nausea and vomiting/hypokalemia    Discharge Plan: To be determined    Code Status: Level 3 - DNAR and DNI      Subjective:   Continues to be nauseous and vomited every few minutes all night long  She was put back on IV fluids  She denies any pain to me  No fever or chills   she states that she is keeping some fluids /soda down    Objective:     Vitals:   Temp (24hrs), Av 1 °F (36 7 °C), Min:97 5 °F (36 4 °C), Max:99 5 °F (37 5 °C)    Temp:  [97 5 °F (36 4 °C)-99 5 °F (37 5 °C)] 97 5 °F (36 4 °C)  HR:  [] 79  Resp:  [18-25] 18  BP: ()/(60-90) 152/90  SpO2:  [81 %-98 %] 94 %  Body mass index is 18 1 kg/m²  Input and Output Summary (last 24 hours): Intake/Output Summary (Last 24 hours) at 7/3/2019 1240  Last data filed at 2019 1947  Gross per 24 hour   Intake 1650 ml   Output 200 ml   Net 1450 ml           Physical Exam:     Vital signs reviewed as above  Continues to be nauseous  Conjunctivae are pale  Lips are dry  S1 and S2 audible  Coarse crackles on auscultation bilateral  Abdomen is soft    Bowel sounds are audible  Awake and alert  Anxious  Weak  Has easily visible muscle tendons/bones    Additional Data:     Labs:    Results from last 7 days   Lab Units 07/03/19  0518   WBC Thousand/uL 9 39   HEMOGLOBIN g/dL 10 4*   HEMATOCRIT % 31 6*   PLATELETS Thousands/uL 220   NEUTROS PCT % 94*   LYMPHS PCT % 3*   MONOS PCT % 2*   EOS PCT % 0     Results from last 7 days   Lab Units 07/03/19  0518   POTASSIUM mmol/L 2 7*   CHLORIDE mmol/L 103   CO2 mmol/L 34*   BUN mg/dL 16   CREATININE mg/dL 0 57*   CALCIUM mg/dL 9 1   ALK PHOS U/L 65   ALT U/L 6*   AST U/L 12           * I Have Reviewed All Lab Data Listed Above  * Additional Pertinent Lab Tests Reviewed:  All Labs Within Last 24 Hours Reviewed        Recent Cultures (last 7 days):           Last 24 Hours Medication List:     Current Facility-Administered Medications:  al mag oxide-diphenhydramine-lidocaine viscous 10 mL Swish & Swallow Q6H PRN Favian Foote MD    albuterol 2 5 mg Nebulization Q6H PRN Basilia Maradiaga MD    cefepime 1,000 mg Intravenous Q12H Mishel Ryan MD Last Rate: 1,000 mg (07/03/19 1056)   dexamethasone 4 mg Intravenous BID (AM & Afternoon) Erick Henderson MD    enoxaparin 40 mg Subcutaneous Daily Favian Foote MD    famotidine 20 mg Oral Daily Favian Foote MD    gabapentin 600 mg Oral BID Favian Foote MD    guaiFENesin 600 mg Oral Q12H [address] Favian Foote MD    HYDROmorphone 1 mg Intravenous Q4H Erick Henderson MD    HYDROmorphone 1 mg Intravenous Q3H PRN Erick Henderson MD    ipratropium 0 5 mg Nebulization TID Basilia Maradiaga MD    levalbuterol 1 25 mg Nebulization TID Basilia Maradiaga MD    levETIRAcetam 500 mg Intravenous Q12H [address] Basilia Maradiaga MD Last Rate: 500 mg (07/03/19 0905)   LORazepam 0 5 mg Intravenous Q6H PRN Erick Henderson MD    LORazepam 0 5 mg Intravenous Q6H Erick Henderson MD    magnesium sulfate 2 g Intravenous Daily PRN Ko Gill MD    metoclopramide 10 mg Intravenous Q6H [address] Keon Roberts III, MD    metroNIDAZOLE 500 mg Intravenous Q8H Vanda Hall PA-C Last Rate: 500 mg (07/03/19 0510)   ondansetron 8 mg Intravenous Kajal Riggs MD Last Rate: 8 mg (07/03/19 1034)   pantoprazole 40 mg Intravenous Q12H [facility] Skye Coppola PA-C    potassium chloride 20 mEq Oral 6x Daily Nydia Junior MD    potassium chloride 20 mEq Intravenous Q2H Harley Mack MD    promethazine 25 mg Intravenous Q6H PRN Enzo Avalos MD    saccharomyces boulardii 250 mg Oral BID Favian Foote MD    senna 3 tablet Oral HS Favian Foote MD    sodium chloride 0 9 % with KCl 40 mEq/L 75 mL/hr Intravenous Continuous Harley Mack MD Last Rate: 75 mL/hr (07/03/19 1144)        Today, Patient Was Seen By: Harley Mack MD    ** Please Note: Dragon 360 Dictation voice to text software may have been used in the creation of this document   **

## 2019-07-03 NOTE — PROGRESS NOTES
Progress note - Palliative and Supportive Care   Natalie Peng 60 y o  female [id number]    Assessment:     Patient Active Problem List   Diagnosis    Gastro-esophageal reflux disease without esophagitis    History of breast cancer    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 ([facility] [address] )    Seizure disorder ([facility] [address] )    Cancer associated pain    Cellulitis of chest wall    Therapeutic opioid-induced constipation (OIC)    Dehydration    Multifocal pneumonia    Moderate protein-calorie malnutrition (HCC)    Acute respiratory failure with hypoxia (HCC)    Hypokalemia    Intractable nausea and vomiting         Plan:  1  Symptom management -    - scheduled zofran 8mg q8h   - Scheduled reglan 10mg q6h   - Decadron 4mg BID   - Will add scheduled ativan 0 5mg q6h   - Rotated opioids from natural to semisynthetic  Now on dilaudid 1mg q4h ATC with 1mg q3h PRN  - If nausea remains uncontrolled may consider Tigan  Emend is not on formulary here  2  Goals - Will need goals of care discussion  Unfortunately patient's acute symptom burden and the fact that her  is in [facility] complicate this tremendously  If symptoms improve consider care team meeting on Friday with Palliative and Oncology  Interval history:       Patient's nausea still very poorly controlled  Had an emesis basin with substantial billious emesis in room  Pain better controlled with access of port, but still with substnatial pain whcih she attributes to wretching         MEDICATIONS / ALLERGIES:     all current active meds have been reviewed and current meds:   Current Facility-Administered Medications   Medication Dose Route Frequency    al mag oxide-diphenhydramine-lidocaine viscous (MAGIC MOUTHWASH) suspension 10 mL  10 mL Swish & Swallow Q6H PRN    albuterol inhalation solution 2 5 mg  2 5 mg Nebulization Q6H PRN    cefepime (MAXIPIME) 1,000 mg in dextrose 5 % 50 mL IVPB  1,000 mg Intravenous Q12H    dexamethasone (DECADRON) injection 4 mg  4 mg Intravenous BID (AM & Afternoon)    enoxaparin (LOVENOX) subcutaneous injection 40 mg  40 mg Subcutaneous Daily    famotidine (PEPCID) tablet 20 mg  20 mg Oral Daily    gabapentin (NEURONTIN) capsule 600 mg  600 mg Oral BID    guaiFENesin (MUCINEX) 12 hr tablet 600 mg  600 mg Oral Q12H MAGNOLIA    HYDROmorphone (DILAUDID) injection 1 mg  1 mg Intravenous Q4H    HYDROmorphone (DILAUDID) injection 1 mg  1 mg Intravenous Q3H PRN    ipratropium (ATROVENT) 0 02 % inhalation solution 0 5 mg  0 5 mg Nebulization TID    levalbuterol (XOPENEX) inhalation solution 1 25 mg  1 25 mg Nebulization TID    levETIRAcetam (KEPPRA) 500 mg in sodium chloride 0 9 % 100 mL IVPB  500 mg Intravenous Q12H [address]    LORazepam (ATIVAN) 2 mg/mL injection 0 5 mg  0 5 mg Intravenous Q6H PRN    LORazepam (ATIVAN) 2 mg/mL injection 0 5 mg  0 5 mg Intravenous Q6H    magnesium sulfate 2 g/50 mL IVPB (premix) 2 g  2 g Intravenous Daily PRN    metoclopramide (REGLAN) injection 10 mg  10 mg Intravenous Q6H [address]    metroNIDAZOLE (FLAGYL) IVPB (premix) 500 mg  500 mg Intravenous Q8H    ondansetron (ZOFRAN) 8 mg in sodium chloride 0 9 % 50 mL IVPB  8 mg Intravenous Q8H    pantoprazole (PROTONIX) injection 40 mg  40 mg Intravenous Q12H [address]    potassium chloride 10 % oral solution 20 mEq  20 mEq Oral 6x Daily    promethazine (PHENERGAN) injection 25 mg  25 mg Intravenous Q6H PRN    saccharomyces boulardii (FLORASTOR) capsule 250 mg  250 mg Oral BID    senna (SENOKOT) tablet 25 8 mg  3 tablet Oral HS    sodium chloride 0 9 % with KCl 40 mEq/L infusion (premix)  75 mL/hr Intravenous Continuous       No Known Allergies    OBJECTIVE:    Physical Exam  Physical Exam   Constitutional: She appears distressed  Frail and cachectic   Eyes: Right eye exhibits no discharge  Left eye exhibits no discharge  No scleral icterus  Pulmonary/Chest: Effort normal  No respiratory distress     Abdominal: She exhibits no distension  Musculoskeletal: She exhibits no edema  Neurological: She is alert  Skin: Skin is warm and dry  She is not diaphoretic  Psychiatric:   tearful   Nursing note and vitals reviewed  Lab Results:   I have personally reviewed pertinent labs  , CBC:   Lab Results   Component Value Date    WBC 9 39 07/03/2019    HGB 10 4 (L) 07/03/2019    HCT 31 6 (L) 07/03/2019    MCV 96 07/03/2019     07/03/2019    MCH 31 7 07/03/2019    MCHC 32 9 07/03/2019    RDW 11 9 07/03/2019    MPV 8 8 (L) 07/03/2019    NRBC 0 07/03/2019   , CMP:   Lab Results   Component Value Date    SODIUM 145 07/03/2019    K 3 3 (L) 07/03/2019     07/03/2019    CO2 32 07/03/2019    BUN 18 07/03/2019    CREATININE 0 63 07/03/2019    CALCIUM 9 3 07/03/2019    AST 12 07/03/2019    ALT 6 (L) 07/03/2019    ALKPHOS 65 07/03/2019    EGFR 96 07/03/2019         Counseling / Coordination of Care  Total floor / unit time spent today 25+minutes  Greater than 50% of total time was spent with the patient and / or family counseling and / or coordination of care  A description of the counseling / coordination of care: substantial parenteral symptom control including opioids and antiemetics    Discussion with GI team and SLIM team

## 2019-07-03 NOTE — PROGRESS NOTES
GI Progress Note - Kareen Jaime 60 y o  female MRN: [id number]    Unit/Bed#: -01 Encounter: [id number]    Subjective: She is having mild improvement with scheduled reglan and zofran with phenergan PRN  No BMs, passing flatus  Objective:     Vitals: Blood pressure 152/90, pulse 79, temperature 97 5 °F (36 4 °C), resp  rate 18, height 5' 2" (1 575 m), weight 44 9 kg (98 lb 15 8 oz), SpO2 95 %  ,Body mass index is 18 1 kg/m²  Intake/Output Summary (Last 24 hours) at 7/3/2019 1340  Last data filed at 7/2/2019 1947  Gross per 24 hour   Intake 1650 ml   Output 200 ml   Net 1450 ml       Physical Exam:     General Appearance: Alert, oriented x3, cachectic and chronically ill appearing  Lungs: Clear to auscultation bilaterally, no rales or rhonchi, no labored breathing/accessory muscle use  Heart: Regular rate and rhythm, S1, S2 normal, no murmur, click, rub or gallop  Abdomen: Non-distended, soft, BS active, NTTP  Extremities: No cyanosis, edema    Invasive Devices     Central Venous Catheter Line            Port A Cath 06/21/19 Right Chest 11 days                Lab Results:  Results from last 7 days   Lab Units 07/03/19  0518   WBC Thousand/uL 9 39   HEMOGLOBIN g/dL 10 4*   HEMATOCRIT % 31 6*   PLATELETS Thousands/uL 220   NEUTROS PCT % 94*   LYMPHS PCT % 3*   MONOS PCT % 2*   EOS PCT % 0     Results from last 7 days   Lab Units 07/03/19  0518   POTASSIUM mmol/L 2 7*   CHLORIDE mmol/L 103   CO2 mmol/L 34*   BUN mg/dL 16   CREATININE mg/dL 0 57*   CALCIUM mg/dL 9 1   ALK PHOS U/L 65   ALT U/L 6*   AST U/L 12               Imaging Studies: I have personally reviewed pertinent imaging studies  Xr Chest 2 Views    Result Date: 6/29/2019  Impression: Bilateral multilobar pneumonia Workstation performed: [id number]     Ct Abdomen Pelvis With Contrast    Result Date: 6/28/2019  Impression: 1    Redemonstrated pleural-based masses with stable erosive change within the left lateral 9th and 10th ribs compatible with metastasis  2   Redemonstrated bibasilar scarring and bronchiectatic changes with increased patchy opacity within the right lower and middle lobes which may represent superimposed pneumonia versus aspiration  3   Diffuse thickening of the colon compatible with nonspecific colitis  4   Stable 1 cm indeterminate left adrenal nodule   Workstation performed: [id number]       Assessment and Plan:    Nausea/Vomiting  Hx Metastatic Esophageal Cancer s/p Esophagectomy and Gastric Pull Through  - Chemotherapy induced nausea/vomiting as they occurred on Thursday in the middle of her chemotherapy cycle (Wed-Fri last week) and she reports similar symptoms when receiving chemotherapy at the time of her initial diagnosis  - Continue scheduled zofran and reglan, PRN phenergan  - Decadron and ativan per palliative, agree with trial of Tigan if symptoms persist  - Replete electrolytes  - Defer further evaluation with EGD or barium swallow as her symptoms are improving and again are likely chemotherapy induced, may be exacerbated by flagyl/antibiotics  - Continue treatment of aspiration pnuemonia  - She did have an EGD in April 2019 which showed normal gastric pull through anatomy, biopsies were neg for malignancy at that time      The patient was seen and examined by Dr Min Fuentes

## 2019-07-03 NOTE — OCCUPATIONAL THERAPY NOTE
Occupational Therapy Cancellation note      Patient Name: Jen Clemens  XEHWA'G Date: 7/3/2019      OT order received  Chart review performed  At this time, OT evaluation cancelled per communication with ELSA Caceres secondary to patient continues to present with nausea & vomiting  OT will follow and evaluate as appropriate

## 2019-07-03 NOTE — PLAN OF CARE
Problem: Potential for Falls  Goal: Patient will remain free of falls  Description  INTERVENTIONS:  - Assess patient frequently for physical needs  -  Identify cognitive and physical deficits and behaviors that affect risk of falls    -  [place] fall precautions as indicated by assessment   - Educate patient/family on patient safety including physical limitations  - Instruct patient to call for assistance with activity based on assessment  - Modify environment to reduce risk of injury  - Consider OT/PT consult to assist with strengthening/mobility  Outcome: Progressing     Problem: PAIN - ADULT  Goal: Verbalizes/displays adequate comfort level or baseline comfort level  Description  Interventions:  - Encourage patient to monitor pain and request assistance  - Assess pain using appropriate pain scale  - Administer analgesics based on type and severity of pain and evaluate response  - Implement non-pharmacological measures as appropriate and evaluate response  - Consider cultural and social influences on pain and pain management  - Notify physician/advanced practitioner if interventions unsuccessful or patient reports new pain  Outcome: Progressing     Problem: INFECTION - ADULT  Goal: Absence or prevention of progression during hospitalization  Description  INTERVENTIONS:  - Assess and monitor for signs and symptoms of infection  - Monitor lab/diagnostic results  - Monitor all insertion sites, i e  indwelling lines, tubes, and drains  - Monitor endotracheal (as able) and nasal secretions for changes in amount and color  - [place] appropriate cooling/warming therapies per order  - Administer medications as ordered  - Instruct and encourage patient and family to use good hand hygiene technique  - Identify and instruct in appropriate isolation precautions for identified infection/condition  Outcome: Progressing     Problem: SAFETY ADULT  Goal: Patient will remain free of falls  Description  INTERVENTIONS:  - Assess patient frequently for physical needs  -  Identify cognitive and physical deficits and behaviors that affect risk of falls  -  [place] fall precautions as indicated by assessment   - Educate patient/family on patient safety including physical limitations  - Instruct patient to call for assistance with activity based on assessment  - Modify environment to reduce risk of injury  - Consider OT/PT consult to assist with strengthening/mobility  Outcome: Progressing     Problem: DISCHARGE PLANNING  Goal: Discharge to home or other facility with appropriate resources  Description  INTERVENTIONS:  - Identify barriers to discharge w/patient and caregiver  - Arrange for needed discharge resources and transportation as appropriate  - Identify discharge learning needs (meds, wound care, etc )  - Arrange for interpretive services to assist at discharge as needed  - Refer to Case Management Department for coordinating discharge planning if the patient needs post-hospital services based on physician/advanced practitioner order or complex needs related to functional status, cognitive ability, or social support system  Outcome: Progressing     Problem: Knowledge Deficit  Goal: Patient/family/caregiver demonstrates understanding of disease process, treatment plan, medications, and discharge instructions  Description  Complete learning assessment and assess knowledge base    Interventions:  - Provide teaching at level of understanding  - Provide teaching via preferred learning methods  Outcome: Progressing     Problem: RESPIRATORY - ADULT  Goal: Achieves optimal ventilation and oxygenation  Description  INTERVENTIONS:  - Assess for changes in respiratory status  - Assess for changes in mentation and behavior  - Position to facilitate oxygenation and minimize respiratory effort  - Oxygen administration by appropriate delivery method based on oxygen saturation (per order) or ABGs  - Initiate smoking cessation education as indicated  - Encourage broncho-pulmonary hygiene including cough, deep breathe, Incentive Spirometry  - Assess the need for suctioning and aspirate as needed  - Assess and instruct to report SOB or any respiratory difficulty  - Respiratory Therapy support as indicated  Outcome: Progressing     Problem: Nutrition/Hydration-ADULT  Goal: Nutrient/Hydration intake appropriate for improving, restoring or maintaining nutritional needs  Description  Monitor and assess patient's nutrition/hydration status for malnutrition (ex- brittle hair, bruises, dry skin, pale skin and conjunctiva, muscle wasting, smooth red tongue, and disorientation)  Collaborate with interdisciplinary team and initiate plan and interventions as ordered  Monitor patient's weight and dietary intake as ordered or per policy  Determine patient's food preferences and provide high-protein, high-caloric foods as appropriate       INTERVENTIONS:  - Monitor oral intake, urinary output, labs, and treatment plans  - Assess nutrition and hydration status and recommend course of action  - Evaluate amount of meals eaten  - Assist patient with eating if necessary   - Allow adequate time for meals  - Recommend/ encourage appropriate diets, oral nutritional supplements, and vitamin/mineral supplements  - Order, calculate, and assess calorie counts as needed  - Recommend, monitor, and adjust tube feedings and TPN/PPN based on assessed needs  - Assess need for intravenous fluids  - Provide nutrition/hydration education as appropriate  - Include patient/family/caregiver in decisions related to nutrition   Outcome: Progressing     Problem: Prexisting or High Potential for Compromised Skin Integrity  Goal: Skin integrity is maintained or improved  Description  INTERVENTIONS:  - Identify patients at risk for skin breakdown  - Assess and monitor skin integrity  - Assess and monitor nutrition and hydration status  - Monitor labs (i e  albumin)  - Assess for incontinence   - Turn and reposition patient  - Assist with mobility/ambulation  - Relieve pressure over bony prominences  - Avoid friction and shearing  - Provide appropriate hygiene as needed including keeping skin clean and dry  - Evaluate need for skin moisturizer/barrier cream  - Collaborate with interdisciplinary team (i e  Nutrition, Rehabilitation, etc )   - Patient/family teaching  Outcome: Progressing

## 2019-07-03 NOTE — CONSULTS
Consult already done  Signed off today  Patient's hypokalemia 2/2 decreased Po intake with persistent N/V  Replace K with IV runs of KCl

## 2019-07-03 NOTE — SOCIAL WORK
CM met with pt bedside  Pt is very hard to hear due to nausea and vomiting  CM confirmed that Palliative Care was in to see pt and provided her with info  Pt stated that she does want to return home when ready for d/c

## 2019-07-03 NOTE — PLAN OF CARE
Problem: Nutrition/Hydration-ADULT  Goal: Nutrient/Hydration intake appropriate for improving, restoring or maintaining nutritional needs  Description  Monitor and assess patient's nutrition/hydration status for malnutrition (ex- brittle hair, bruises, dry skin, pale skin and conjunctiva, muscle wasting, smooth red tongue, and disorientation)  Collaborate with interdisciplinary team and initiate plan and interventions as ordered  Monitor patient's weight and dietary intake as ordered or per policy  Determine patient's food preferences and provide high-protein, high-caloric foods as appropriate  INTERVENTIONS:  - Monitor oral intake, urinary output, labs, and treatment plans  - Assess nutrition and hydration status and recommend course of action  - Evaluate amount of meals eaten  - Assist patient with eating if necessary   - Allow adequate time for meals  - Recommend/ encourage appropriate diets, oral nutritional supplements, and vitamin/mineral supplements  - Order, calculate, and assess calorie counts as needed  - Recommend, monitor, and adjust tube feedings and TPN/PPN based on assessed needs  - Assess need for intravenous fluids  - Provide nutrition/hydration education as appropriate  - Include patient/family/caregiver in decisions related to nutrition   Outcome: Not Progressing  Note:   Pt continues on high protein, high calorie diet with magic cup TID  she is not eating at mealtimes as she cannot keep any oral intake down  She does not like the magic cup supplement and is not eating it, will d/c at this time  No new weight since 6/29- 98#  Please obtain new accurate weight as able  K is low, most likely 2/2 poor intake and continued NV, please replete  If intake does not improve, which is most likely to be the case, nutrition support is warranted to prevent further malnourishment  Pt is at high risk for refeeding syndrome  Will continue to monitor status and overall plan of care

## 2019-07-03 NOTE — PROGRESS NOTES
NEPHROLOGY PROGRESS NOTE    Patient: Julio Faustin               Sex: female          DOA: 6/28/2019  6:09 PM   YOB: 1955        Age:  60 y o         LOS:  LOS: 4 days   7/3/2019    REASON FOR THE CONSULTATION:      Hypokalemia    SUBJECTIVE     Patient is sitting up in bed and is on no distress  States that she becomes nauseous if she moves around in bed  Currently getting IV potassium supplementation      CURRENT MEDICATIONS       Current Facility-Administered Medications:     al mag oxide-diphenhydramine-lidocaine viscous (MAGIC MOUTHWASH) suspension 10 mL, 10 mL, Swish & Swallow, Q6H PRN, Favian Foote MD, 10 mL at 07/02/19 2028    albuterol inhalation solution 2 5 mg, 2 5 mg, Nebulization, Q6H PRN, Kendy Hameed MD    cefepime (MAXIPIME) 1,000 mg in dextrose 5 % 50 mL IVPB, 1,000 mg, Intravenous, Q12H, Poli Chester MD, Last Rate: 100 mL/hr at 07/03/19 1056, 1,000 mg at 07/03/19 1056    dexamethasone (DECADRON) injection 4 mg, 4 mg, Intravenous, BID (AM & Afternoon), Hari Calderon MD, 4 mg at 07/03/19 0854    enoxaparin (LOVENOX) subcutaneous injection 40 mg, 40 mg, Subcutaneous, Daily, Favian Foote MD, 40 mg at 07/03/19 0856    famotidine (PEPCID) tablet 20 mg, 20 mg, Oral, Daily, Favian Foote MD, 20 mg at 07/01/19 0926    gabapentin (NEURONTIN) capsule 600 mg, 600 mg, Oral, BID, Favian Foote MD, 600 mg at 07/01/19 1753    guaiFENesin (MUCINEX) 12 hr tablet 600 mg, 600 mg, Oral, Q12H [facility], Favian Foote MD, 600 mg at 07/01/19 2017    HYDROmorphone (DILAUDID) injection 1 mg, 1 mg, Intravenous, Q4H, Hari Calderon MD, 1 mg at 07/03/19 0906    HYDROmorphone (DILAUDID) injection 1 mg, 1 mg, Intravenous, Q3H PRN, Hari Calderon MD    ipratropium (ATROVENT) 0 02 % inhalation solution 0 5 mg, 0 5 mg, Nebulization, TID, Kendy Hameed MD, 0 5 mg at 07/03/19 0751    levalbuterol (XOPENEX) inhalation solution 1 25 mg, 1 25 mg, Nebulization, TID, Kendy Hameed MD, 1 25 mg at 07/03/19 0751    levETIRAcetam (KEPPRA) 500 mg in sodium chloride 0 9 % 100 mL IVPB, 500 mg, Intravenous, Q12H [facility], Arcadio Atkinson MD, Last Rate: 400 mL/hr at 07/03/19 0905, 500 mg at 07/03/19 0905    LORazepam (ATIVAN) 2 mg/mL injection 0 5 mg, 0 5 mg, Intravenous, Q6H PRN, Rianna Davila MD, 0 5 mg at 07/02/19 0951    LORazepam (ATIVAN) 2 mg/mL injection 0 5 mg, 0 5 mg, Intravenous, Q6H, Rianna Davila MD, 0 5 mg at 07/03/19 0906    magnesium sulfate 2 g/50 mL IVPB (premix) 2 g, 2 g, Intravenous, Daily PRN, Lilia Hannon MD    metoclopramide (REGLAN) injection 10 mg, 10 mg, Intravenous, Q6H [facility], Viri Enriquez III, MD, 10 mg at 07/03/19 1144    metroNIDAZOLE (FLAGYL) IVPB (premix) 500 mg, 500 mg, Intravenous, Q8H, Kristine Sharp PA-C, Last Rate: 200 mL/hr at 07/03/19 0510, 500 mg at 07/03/19 0510    ondansetron (ZOFRAN) 8 mg in sodium chloride 0 9 % 50 mL IVPB, 8 mg, Intravenous, Q8H, Rianna Davila MD, Last Rate: 200 mL/hr at 07/03/19 1034, 8 mg at 07/03/19 1034    pantoprazole (PROTONIX) injection 40 mg, 40 mg, Intravenous, Q12H [facility], Skye Coppola PA-C, 40 mg at 07/03/19 0854    potassium chloride 10 % oral solution 20 mEq, 20 mEq, Oral, 6x Daily, Abel Tellez MD, 20 mEq at 07/02/19 2249    potassium chloride 20 mEq IVPB (premix), 20 mEq, Intravenous, Q2H, Lilia Hannon MD, Last Rate: 50 mL/hr at 07/03/19 1257, 20 mEq at 07/03/19 1257    promethazine (PHENERGAN) injection 25 mg, 25 mg, Intravenous, Q6H PRN, Rianna Davila MD, 25 mg at 07/02/19 1942    saccharomyces boulardii (FLORASTOR) capsule 250 mg, 250 mg, Oral, BID, Favian Foote MD, 250 mg at 07/01/19 1753    senna (SENOKOT) tablet 25 8 mg, 3 tablet, Oral, HS, Favian Foote MD, 25 8 mg at 06/30/19 0026    sodium chloride 0 9 % with KCl 40 mEq/L infusion (premix), 75 mL/hr, Intravenous, Continuous, Lilia Hannon MD, Last Rate: 75 mL/hr at 07/03/19 1144, 75 mL/hr at 07/03/19 1144    REVIEW OF SYSTEMS     Review of Systems   Constitutional: Negative  HENT: Negative  Eyes: Negative  Respiratory: Negative  Cardiovascular: Negative  Gastrointestinal: Positive for nausea and vomiting  Endocrine: Negative  Genitourinary: Negative  Musculoskeletal: Negative  Skin: Negative  Allergic/Immunologic: Negative  Neurological: Negative  Hematological: Negative  All other systems reviewed and are negative  OBJECTIVE     Current Weight: Weight - Scale: 44 9 kg (98 lb 15 8 oz)  Vitals:    07/03/19 0752   BP:    Pulse:    Resp:    Temp:    SpO2: 94%     Body mass index is 18 1 kg/m²  Intake/Output Summary (Last 24 hours) at 7/3/2019 1259  Last data filed at 7/2/2019 1947  Gross per 24 hour   Intake 1650 ml   Output 200 ml   Net 1450 ml       PHYSICAL EXAMINATION     Physical Exam   Constitutional: She is oriented to person, place, and time  [place]-looking female who is in no distress  HENT:   Head: Normocephalic and atraumatic  Eyes: Pupils are equal, round, and reactive to light  Neck: Neck supple  Cardiovascular: Normal rate, regular rhythm and normal heart sounds  Pulmonary/Chest: Effort normal    Abdominal: Soft  Bowel sounds are normal  There is tenderness  Musculoskeletal: Normal range of motion  Neurological: She is alert and oriented to person, place, and time  Skin: Skin is warm  Psychiatric: She has a normal mood and affect           LAB RESULTS     Results from last 7 days   Lab Units 07/03/19  0518 07/02/19  1345 07/02/19  0513 07/01/19  1311 06/30/19  1156 06/30/19  0518 06/29/19  0642 06/28/19  1917   WBC Thousand/uL 9 39  --  13 47* 12 38*  --  7 65 16 49* 15 21*   HEMOGLOBIN g/dL 10 4*  --  11 0* 11 2*  --  12 1 11 8 12 1   HEMATOCRIT % 31 6*  --  32 8* 32 8*  --  35 5 36 3 36 2   PLATELETS Thousands/uL 220  --  267 281  --  376 373 400*   POTASSIUM mmol/L 2 7* 2 7* 1 9* 2 0* 2 7*  2 7* 2 6* 2 7* 3 4*   CHLORIDE mmol/L 103 100 98* 99* 98* 97* 101 98*   CO2 mmol/L 34* 36* 33* 31 28 27 28 28   BUN mg/dL 16 11 11 11 11 9 8 10   CREATININE mg/dL 0 57* 0 57* 0 60 0 57* 0 59* 0 61 0 60 0 72   EGFR ml/min/1 73sq m 100 100 98 100 99 97 98 90   CALCIUM mg/dL 9 1 9 4 9 3 9 6 9 8 9 6 9 6 10 1   MAGNESIUM mg/dL  --   --   --   --   --  1 7  --  1 4*   PHOSPHORUS mg/dL  --   --   --   --  2 2*  --   --   --            RADIOLOGY RESULTS      Results for orders placed during the hospital encounter of 06/17/19   XR chest portable    Narrative CHEST     INDICATION:   Status post port placement  COMPARISON:  6/19/2019    EXAM PERFORMED/VIEWS:  XR CHEST PORTABLE      FINDINGS:  Right-sided infusion port catheter device is present in good position  There are surgical clips over the right side of the chest and in the right axilla  Cardiomediastinal silhouette appears unremarkable  Nodular opacities are seen in both lower lateral lung fields, stable from the recent prior  Previously, there was a left-sided hydropneumothorax base  The gas component is no longer confidently identified  There is residual left pleural fluid present  No definitive evidence of right-sided effusion  Osseous structures appear within normal limits for patient age  Impression No evidence of right-sided pneumothorax after infusion port catheter placement  Positioning appears satisfactory  Persistent stable bibasilar lung nodularity  There is left pleural effusion but there is no longer any visible left pneumothorax component  Workstation performed: [id number]       Results for orders placed during the hospital encounter of 06/28/19   XR chest 2 views    Narrative CHEST     INDICATION:   cough, persistent vomiting  COMPARISON:  June 21, 2019    EXAM PERFORMED/VIEWS:  XR CHEST PA & LATERAL  Images: 2    FINDINGS:  The tip of the right internal jugular port remains in the superior vena cava  Cardiomediastinal silhouette appears unremarkable      Abnormal infiltrates are now visible on the right upper and right middle lobes  These were not seen previously  In the appropriate clinical setting, this would be consistent with pneumonia  The nodules at the right base are obscured by the current infiltrate  The nodules at the left base are much less distinct  There may be some developing infiltrate at the left base as well  Impression Bilateral multilobar pneumonia        Workstation performed: [id number]         ASSESSMENT/PLAN     71-year-old female with past medical history of metastatic esophageal cancer status post esophagectomy, palliative chemotherapy who presented with intractable nausea and vomiting and found to have hypokalemia  1  Hypokalemia:  Etiology likely decreased p o  Intake along with fluid administration resulting in distal sodium delivery and hence efflux of potassium  Patient is currently getting runs of IV KCl  Current potassium is 2 7  Continue given runs of IV KCl if patient can tolerated until potassium which is 4  Will sign off at this time            Kaushal Aviles MD  Nephrology  7/3/2019

## 2019-07-03 NOTE — PHYSICAL THERAPY NOTE
Physical Therapy Cancellation Note    PT order received  Chart review performed  At this time, PT evaluation cancelled per communication with ELSA Hernandez secondary to patient continues to present with nausea & vomiting  PT will follow and evaluate as appropriate      Malcom Mathis, PT, DPT

## 2019-07-03 NOTE — UTILIZATION REVIEW
Continued Stay Review    Date: 7/3                     Current Patient Class: IP  Current Level of Care: MS     HPI:64 y o  female initially admitted on 6/29  1124    Assessment/Plan: 59 yo female admitted for N/V , w/ hx of esophageal cancer   On scheduled antiemetics , medications are being adjusted  Palliative care consult done and recommend hospice   Multifocal pneumonia high risk for aspiration   Cancer assoc pain , control    7/3 Nephrology note   Hypokalemia:  Etiology likely decreased p o  Intake along with fluid administration resulting in distal sodium delivery and hence efflux of potassium  Patient is currently getting runs of IV KCl  Current potassium is 2 7  Continue given runs of IV KCl if patient can tolerated until potassium which is 4     7/3 GI note   Metastatic Esophageal Cancer s/p Esophagectomy and Gastric Pull Through  Chemotherapy induced nausea/vomiting as they occurred on Thursday in the middle of her chemotherapy cycle (Wed-Fri last week) and she reports similar symptoms when receiving , cont zofran and reglan , prn phenergan        Pertinent Labs/Diagnostic Results:   Results from last 7 days   Lab Units 07/03/19  0518 07/02/19  0513 07/01/19  1311 06/30/19  0518 06/29/19  0642 06/28/19  1917   WBC Thousand/uL 9 39 13 47* 12 38* 7 65 16 49* 15 21*   HEMOGLOBIN g/dL 10 4* 11 0* 11 2* 12 1 11 8 12 1   HEMATOCRIT % 31 6* 32 8* 32 8* 35 5 36 3 36 2   PLATELETS Thousands/uL 220 267 281 376 373 400*   NEUTROS ABS Thousands/µL 8 82* 12 71* 11 81* 7 16  --   --    BANDS PCT %  --   --   --   --  4 4     Results from last 7 days   Lab Units 07/03/19  1315 07/03/19  0518 07/02/19  1345 07/02/19  0513  06/30/19  1156 06/30/19  0518  06/28/19  1917   SODIUM mmol/L 145 145 140 141   < > 137 135*   < > 138   POTASSIUM mmol/L 3 3* 2 7* 2 7* 1 9*   < > 2 7*  2 7* 2 6*   < > 3 4*   CHLORIDE mmol/L 105 103 100 98*   < > 98* 97*   < > 98*   CO2 mmol/L 32 34* 36* 33*   < > 28 27   < > 28   ANION GAP mmol/L 8 8 4 10   < > 11 11   < > 12   BUN mg/dL 18 16 11 11   < > 11 9   < > 10   CREATININE mg/dL 0 63 0 57* 0 57* 0 60   < > 0 59* 0 61   < > 0 72   EGFR ml/min/1 73sq m 96 100 100 98   < > 99 97   < > 90   CALCIUM mg/dL 9 3 9 1 9 4 9 3   < > 9 8 9 6   < > 10 1   MAGNESIUM mg/dL 1 5*  --   --   --   --   --  1 7  --  1 4*   PHOSPHORUS mg/dL  --   --   --   --   --  2 2*  --   --   --     < > = values in this interval not displayed       Results from last 7 days   Lab Units 07/03/19  0518 06/30/19  0518 06/28/19  1917   AST U/L 12 27 14   ALT U/L 6* 8* 8*   ALK PHOS U/L 65 76 84   TOTAL PROTEIN g/dL 5 9* 6 9 7 4   ALBUMIN g/dL 2 0* 2 4* 2 7*   TOTAL BILIRUBIN mg/dL 0 40 0 70 0 40   BILIRUBIN DIRECT mg/dL  --   --  0 10     Results from last 7 days   Lab Units 07/03/19  1315 07/03/19  0518 07/02/19  1345 07/02/19  0513 07/01/19  1311 06/30/19  1156 06/30/19  0518 06/29/19  0642 06/28/19  1917   GLUCOSE RANDOM mg/dL 117 116 90 101 111 115 117 103 121     Results from last 7 days   Lab Units 06/30/19  0518 06/28/19  1917   LACTIC ACID mmol/L 1 5 1 2     Results from last 7 days   Lab Units 06/28/19  1902   CLARITY UA  Clear   COLOR UA  Yellow   SPEC GRAV UA  1 020   PH UA  6 5   GLUCOSE UA mg/dl Negative   KETONES UA mg/dl Negative   BLOOD UA  Negative   PROTEIN UA mg/dl Trace*   NITRITE UA  Negative   BILIRUBIN UA  Negative   UROBILINOGEN UA E U /dl 0 2   LEUKOCYTES UA  Negative   WBC UA /hpf 1-2*   RBC UA /hpf None Seen   BACTERIA UA /hpf Occasional   EPITHELIAL CELLS WET PREP /hpf Occasional   MUCUS THREADS  Occasional*     Vital Signs:   07/03/19 1316  --  --  --  --  95 %  Nasal cannula   07/03/19 0752  --  --  --  --  94 %  Nasal cannula   07/03/19 0728  --  79  --  --  92 %  --   07/03/19 0727  97 5 °F (36 4 °C)  75  --  152/90  93 %  --   07/03/19 0726  --  78  --  --  91 %  --   07/03/19 0725  --  81  --  --  92 %  --   07/03/19 0724  --  79  --  --  92 %  --   07/03/19 0723  97 5 °F (36 4 °C)  80  18  152/90 92 %  --       Medications:   Scheduled Meds:   Current Facility-Administered Medications:  al mag oxide-diphenhydramine-lidocaine viscous 10 mL Swish & Swallow Q6H PRN     albuterol 2 5 mg Nebulization Q6H PRN     cefepime 1,000 mg Intravenous Q12H     dexamethasone 4 mg Intravenous BID (AM & Afternoon)     enoxaparin 40 mg Subcutaneous Daily     famotidine 20 mg Oral Daily     gabapentin 600 mg Oral BID     guaiFENesin 600 mg Oral Q12H MAGNOLIA     HYDROmorphone 1 mg Intravenous Q4H     HYDROmorphone 1 mg Intravenous Q3H PRN     ipratropium 0 5 mg Nebulization TID     levalbuterol 1 25 mg Nebulization TID     levETIRAcetam 500 mg Intravenous Q12H MAGNOLIA     LORazepam 0 5 mg Intravenous Q6H PRN     LORazepam 0 5 mg Intravenous Q6H     magnesium sulfate 2 g Intravenous Daily PRN x1    metoclopramide 10 mg Intravenous Q6H MAGNOLIA     metroNIDAZOLE 500 mg Intravenous Q8H     ondansetron 8 mg Intravenous Q8H     pantoprazole 40 mg Intravenous Q12H [address]     potassium chloride 20 mEq Oral 6x Daily     potassium chloride 20 mEq Intravenous Q2H     promethazine 25 mg Intravenous Q6H PRN     saccharomyces boulardii 250 mg Oral BID     senna 3 tablet Oral HS     sodium chloride 0 9 % with KCl 40 mEq/L 75 mL/hr Intravenous Continuous         Discharge Plan: TBD     Network Utilization Review Department  Phone: [phone]; Fax [phone]  [email or web address]  ATTENTION: Please call with any questions or concerns to [phone]  and carefully listen to the prompts so that you are directed to the right person  Send all requests for admission clinical reviews, approved or denied determinations and any other requests to fax [phone]   All voicemails are confidential

## 2019-07-03 NOTE — PROGRESS NOTES
Progress Note - Infectious Disease   Dory George 60 y o  female MRN: [id number]  Unit/Bed#: -01 Encounter: [id number]      Impression/Recommendations:  1  Multifocal pneumonia, involving right side   Given intractable nausea and vomiting, this is most likely aspiration pneumonia   Patient is currently clinically improved on IV antibiotics   Given multiple healthcare contact, patient has risk of resistant Heather [place], risk of MRSA pneumonia is not high   Patient is systemically well, without sepsis or systemic toxicity  Patient is clinically improved  Respiratory symptoms improved  Temperature is down  WBC normalized  Continue IV cefepime  Continue IV Flagyl  Monitor temperature/WBC  Monitor respiratory symptoms  Treat x7 days total, another 2 days      2  Acute hypoxic respiratory failure, secondary to pneumonia above   Patient is clinically improved   O2 support is minimal now  Antibiotic plan as in above  O2 support per primary service      3  Intractable nausea and vomiting, most likely chemotherapy induced   Symptoms are well controlled now   Patient will need to discuss with her oncologist regarding any chemotherapy modification to prevent future symptoms  Nausea worse with p o  Flagyl, improved with changing Flagyl to IV  Symptom management per primary service      4  Moderate protein calorie malnutrition      5  Metastatic esophageal carcinoma, on XRT and chemotherapy   Patient is currently not neutropenic      Discussed with the patient in detail regarding the above plan      Antibiotics:  Cefepime/Flagyl # 5     Subjective:  Patient's nausea persists, but no further vomiting  No abdominal pain  No diarrhea  Dyspnea mild  Cough minimal   Temperature stays down  No chills      Objective:  Vitals:  Temp:  [97 5 °F (36 4 °C)-98 6 °F (37 °C)] 97 5 °F (36 4 °C)  HR:  [] 79  Resp:  [18-25] 18  BP: ()/(60-90) 152/90  SpO2:  [81 %-98 %] 95 %  Temp (24hrs), Av 8 °F (36 6 °C), Min:97 5 °F (36 4 °C), Max:98 6 °F (37 °C)  Current: Temperature: 97 5 °F (36 4 °C)    Physical Exam:     General: Awake, alert, cooperative, no distress  Neck:  Supple  No mass  No lymphadenopathy  Lungs: Decreased breath sounds, no rales, no wheezing, respirations unlabored  Heart:  Regular rate and rhythm, S1 and S2 normal, no murmur  Abdomen: Soft, nondistended, non-tender, bowel sounds active all four quadrants,        no masses, no organomegaly  Extremities: Trace edema  No erythema/warmth  No ulcer  Nontender to palpation  Skin:  No rash  Neuro: Moves all extremities  Invasive Devices     Central Venous Catheter Line            Port A Cath 06/21/19 Right Chest 12 days                Labs studies:   I have personally reviewed pertinent labs  Results from last 7 days   Lab Units 07/03/19  1315 07/03/19  0518 07/02/19  1345  06/30/19  0518  06/28/19  1917   POTASSIUM mmol/L 3 3* 2 7* 2 7*   < > 2 6*   < > 3 4*   CHLORIDE mmol/L 105 103 100   < > 97*   < > 98*   CO2 mmol/L 32 34* 36*   < > 27   < > 28   BUN mg/dL 18 16 11   < > 9   < > 10   CREATININE mg/dL 0 63 0 57* 0 57*   < > 0 61   < > 0 72   EGFR ml/min/1 73sq m 96 100 100   < > 97   < > 90   CALCIUM mg/dL 9 3 9 1 9 4   < > 9 6   < > 10 1   AST U/L  --  12  --   --  27  --  14   ALT U/L  --  6*  --   --  8*  --  8*   ALK PHOS U/L  --  65  --   --  76  --  84    < > = values in this interval not displayed  Results from last 7 days   Lab Units 07/03/19  0518 07/02/19  0513 07/01/19  1311   WBC Thousand/uL 9 39 13 47* 12 38*   HEMOGLOBIN g/dL 10 4* 11 0* 11 2*   PLATELETS Thousands/uL 220 267 281           Imaging Studies:   I have personally reviewed pertinent imaging study reports and images in PACS  EKG, Pathology, and Other Studies:   I have personally reviewed pertinent reports

## 2019-07-04 PROBLEM — E83.42 HYPOMAGNESEMIA: Status: ACTIVE | Noted: 2019-01-01

## 2019-07-04 NOTE — RESPIRATORY THERAPY NOTE
Patient present with pick tub on lap with large amount of sputum    Patient's cehif complaint is her "nose is blocked up "  Patient became relaxed during treatment

## 2019-07-04 NOTE — PROGRESS NOTES
Progress Note - Infectious Disease   Jorge Henry 60 y o  female MRN: [id number]  Unit/Bed#: -01 Encounter: [id number]      Impression/Recommendations:  1  Multifocal pneumonia, involving right side   Given intractable nausea and vomiting, this is most likely aspiration pneumonia   Patient is clinically improved on IV antibiotics   Temperature is down  WBC normalized  Continue IV cefepime/Flagyl  Monitor temperature/WBC  Monitor respiratory symptoms  Treat x7 days total, through tomorrow      2  Acute hypoxic respiratory failure, secondary to pneumonia above   Patient is clinically improved   O2 support is minimal now  Antibiotic plan as in above  O2 support per primary service      3  Intractable nausea and vomiting, most likely chemotherapy induced   Symptoms are better controlled  However, patient is unable to take anything by mouth without vomiting  Patient is ready indicating that she does not want any more chemotherapy  Symptom management per primary service      4  Moderate protein calorie malnutrition      5  Metastatic esophageal carcinoma, on XRT and chemotherapy   Patient is currently not neutropenic  Patient is already indicating this he does not want any more chemotherapy  Consider hospice      Discussed with the patient in detail regarding the above plan  Discussed with Dr Pita Jon from [facility] service      Antibiotics:  Cefepime/Flagyl # 6     Subjective:  Patient's nausea persists, but no further vomiting  She is unable to take any pills  No abdominal pain   No diarrhea  Dyspnea mild  Cough minimal   Temperature stays down   No chills      Objective:  Vitals:  Temp:  [97 7 °F (36 5 °C)] 97 7 °F (36 5 °C)  HR:  [84-97] 86  Resp:  [18] 18  BP: (145)/(93) 145/93  SpO2:  [90 %-99 %] 97 %  Temp (24hrs), Av 7 °F (36 5 °C), Min:97 7 °F (36 5 °C), Max:97 7 °F (36 5 °C)  Current: Temperature: 97 7 °F (36 5 °C)    Physical Exam:     General: Awake, alert, cooperative, no distress  Neck:  Supple  No mass  No lymphadenopathy  Lungs: Decreased breath sounds, no rales, no wheezing, respirations unlabored  Heart:  Regular rate and rhythm, S1 and S2 normal, no murmur  Abdomen: Soft, nondistended, non-tender, bowel sounds active all four quadrants,        no masses, no organomegaly  Extremities: No edema  No erythema/warmth  No ulcer  Nontender to palpation  Skin:  No rash  Neuro: Moves all extremities  Invasive Devices     Central Venous Catheter Line            Port A Cath 06/21/19 Right Chest 12 days                Labs studies:   I have personally reviewed pertinent labs  Results from last 7 days   Lab Units 07/04/19  0603 07/03/19  1315 07/03/19  0518  06/30/19  0518  06/28/19  1917   POTASSIUM mmol/L 4 6 3 3* 2 7*   < > 2 6*   < > 3 4*   CHLORIDE mmol/L 108 105 103   < > 97*   < > 98*   CO2 mmol/L 29 32 34*   < > 27   < > 28   BUN mg/dL 17 18 16   < > 9   < > 10   CREATININE mg/dL 0 53* 0 63 0 57*   < > 0 61   < > 0 72   EGFR ml/min/1 73sq m 102 96 100   < > 97   < > 90   CALCIUM mg/dL 9 9 9 3 9 1   < > 9 6   < > 10 1   AST U/L  --   --  12  --  27  --  14   ALT U/L  --   --  6*  --  8*  --  8*   ALK PHOS U/L  --   --  65  --  76  --  84    < > = values in this interval not displayed  Results from last 7 days   Lab Units 07/03/19  0518 07/02/19  0513 07/01/19  1311   WBC Thousand/uL 9 39 13 47* 12 38*   HEMOGLOBIN g/dL 10 4* 11 0* 11 2*   PLATELETS Thousands/uL 220 267 281           Imaging Studies:   I have personally reviewed pertinent imaging study reports and images in PACS  EKG, Pathology, and Other Studies:   I have personally reviewed pertinent reports

## 2019-07-04 NOTE — PROGRESS NOTES
Progress Note  Ish Osorio 60 y o  female MRN: [id number]  Unit/Bed#: -01 Encounter: [id number]    Subjective:  She is still having severe nausea but is vomiting last   She is tolerating a little little bit of liquid  She is laying in the bed  She looks uncomfortable  She is sweating  She is speaking in a light voice with hoarseness  Objective:     Vitals:   Vitals:    07/04/19 0747   BP:    Pulse: 86   Resp:    Temp:    SpO2: 97%   ,Body mass index is 18 1 kg/m²  Intake/Output Summary (Last 24 hours) at 7/4/2019 1001  Last data filed at 7/3/2019 1900  Gross per 24 hour   Intake 2020 ml   Output --   Net 2020 ml       Physical Exam:     General Appearance: Alert, appears stated age and cooperative  Lungs:  rhonchi, no labored breathing/accessory muscle use  Heart: Regular rate and rhythm, S1, S2 normal, no murmur, click, rub or gallop  Abdomen: Soft, non-tender, non-distended; bowel sounds normal; no masses or no organomegaly  Extremities: No cyanosis, edema    Invasive Devices     Central Venous Catheter Line            Port A Cath 06/21/19 Right Chest 12 days                Lab Results:  No results displayed because visit has over 200 results  Imaging Studies:   I have personally reviewed pertinent imaging studies  Xr Chest Portable    Result Date: 6/22/2019  Narrative: CHEST INDICATION:   Status post port placement  COMPARISON:  6/19/2019 EXAM PERFORMED/VIEWS:  XR CHEST PORTABLE FINDINGS:  Right-sided infusion port catheter device is present in good position  There are surgical clips over the right side of the chest and in the right axilla  Cardiomediastinal silhouette appears unremarkable  Nodular opacities are seen in both lower lateral lung fields, stable from the recent prior  Previously, there was a left-sided hydropneumothorax base  The gas component is no longer confidently identified  There is residual left pleural fluid present     No definitive evidence of right-sided effusion  Osseous structures appear within normal limits for patient age  Impression: No evidence of right-sided pneumothorax after infusion port catheter placement  Positioning appears satisfactory  Persistent stable bibasilar lung nodularity  There is left pleural effusion but there is no longer any visible left pneumothorax component  Workstation performed: [id number]     Xr Chest 1 View Portable    Result Date: 6/17/2019  Narrative: CHEST INDICATION:   cough  COMPARISON:  6/11/2019 EXAM PERFORMED/VIEWS:  XR CHEST PORTABLE FINDINGS: Cardiomediastinal silhouette appears unremarkable  Hyperinflated lung fields  Enlarging pneumothorax at the left lung base with suspected small basilar effusion  Persistent opacity within the left lung base  Osseous structures appear within normal limits for patient age  Impression: Enlarging left basilar pneumothorax with small basilar effusion  Stable left basilar opacity  Hyperinflated lung fields suggesting underlying COPD  The study was marked in [facility] for immediate notification  Workstation performed: [id number]     Xr Chest Portable    Result Date: 6/11/2019  Narrative: CHEST INDICATION:   r/o ptx  COMPARISON:  Chest x-ray from earlier in the day EXAM PERFORMED/VIEWS:  XR CHEST PORTABLE  FINDINGS:  Cardiomediastinal silhouette appears unremarkable  There is stable small left basilar pneumothorax  Osseous structures appear within normal limits for patient age  Impression: Stable small left basilar pneumothorax  Workstation performed: [id number]     Xr Chest 1 View Portable [employer]    Result Date: 6/11/2019  Narrative: CHEST INDICATION:   R/O Pneumothorax  COMPARISON:  CT chest and abdomen 5/1/2019 EXAM PERFORMED/VIEWS:  XR CHEST PORTABLE  FINDINGS:  Cardiomediastinal silhouette appears unremarkable  Opacities in the lung bases consistent with known pulmonary lesions  No pleural effusion  There is small left basilar pneumothorax   Osseous structures appear within normal limits for patient age  Impression: Small left basilar pneumothorax status post lung biopsy  Workstation performed: [id number]     Xr Chest 2 Views    Result Date: 6/29/2019  Narrative: CHEST INDICATION:   cough, persistent vomiting  COMPARISON:  June 21, 2019 EXAM PERFORMED/VIEWS:  XR CHEST PA & LATERAL Images: 2 FINDINGS:  The tip of the right internal jugular port remains in the superior vena cava  Cardiomediastinal silhouette appears unremarkable  Abnormal infiltrates are now visible on the right upper and right middle lobes  These were not seen previously  In the appropriate clinical setting, this would be consistent with pneumonia  The nodules at the right base are obscured by the current infiltrate  The nodules at the left base are much less distinct  There may be some developing infiltrate at the left base as well  Impression: Bilateral multilobar pneumonia Workstation performed: [id number]     Xr Chest Pa & Lateral    Result Date: 6/19/2019  Narrative: CHEST INDICATION:   f/u pneumothorax  COMPARISON:  Chest x-ray from June 18, 2019  EXAM PERFORMED/VIEWS:  XR CHEST PA & LATERAL FINDINGS:  Surgical clips are again noted in the right axilla and the right mid thorax  Cardia mediastinal silhouette is stable  There is slight improved aeration of the left lower lobe with slight interval decrease in the left basilar density  The small left basilar pneumothorax is stable  Small bilateral pleural effusions are again noted  Bibasilar nodular densities are unchanged in appearance  Redemonstrated hiatal hernia  Osseous structures appear within normal limits for patient age  Impression: Postsurgical changes of right breast lumpectomy and axillary dissection Stable bibasilar nodular densities  Stable left basilar pneumothorax  Slight interval improvement of the left basilar density  Small bilateral pleural effusions  Hiatal hernia   Workstation performed: [id number]     Xr Chest Pa & Lateral    Result Date: 6/18/2019  Narrative: CHEST INDICATION:   f/u pneumothorax  COMPARISON:  Chest x-ray dated June 17, 2019  EXAM PERFORMED/VIEWS:  XR CHEST PA & LATERAL FINDINGS: The cardiomediastinal silhouette is unchanged from the prior exam  Again noted is a left lung base pneumothorax which has not significantly changed in size from the prior exam   Small bibasilar opacities are unchanged suggestive of pleural effusions  There is a stable persistent left lower lung opacity  Chronic obstructive airway changes are again noted  Osseous structures appear within normal limits for patient age  Impression: Stable left basilar pneumothorax with small bibasilar effusions  Stable left lower lung opacity  Workstation performed: [id number]     Mri Brain W Wo Contrast    Result Date: 6/19/2019  Narrative: MRI BRAIN WITH AND WITHOUT CONTRAST INDICATION: r/o mets, ha's  COMPARISON:  None  TECHNIQUE: Sagittal T1, axial T2, axial FLAIR, axial T1, axial [place], axial diffusion  Sagittal, axial T1 postcontrast   Axial bravo postcontrast with coronal reconstructions  IV Contrast:  4 mL of gadobutrol injection (MULTI-DOSE)  IMAGE QUALITY:   Diagnostic  FINDINGS: BRAIN PARENCHYMA:  There is no discrete mass, mass effect or midline shift  There is no intracranial hemorrhage  Normal posterior fossa  Diffusion imaging is unremarkable  Small scattered hyperintensities on T2/FLAIR imaging are noted in the periventricular and subcortical white matter demonstrating an appearance that is statistically most likely to represent moderate microangiopathic change  Additional hyperintensity on T2/FLAIR sequences is seen within the central prasanth, likely representing additional white matter disease  Postcontrast imaging of the brain demonstrates no abnormal enhancement   VENTRICLES:  Normal  SELLA AND PITUITARY GLAND:  Normal  ORBITS:  Normal  PARANASAL SINUSES:  Normal  VASCULATURE:  Evaluation of the major intracranial vasculature demonstrates appropriate flow voids  CALVARIUM AND SKULL BASE:  Normal  EXTRACRANIAL SOFT TISSUES:  Normal      Impression: White matter changes suggestive of chronic microangiopathy  No acute intracranial pathology  No evidence of metastatic disease Workstation performed: [id number]     Ct Needle Biopsy Lung    Result Date: 6/11/2019  Narrative: EXAMINATION: CT-guided lung biopsy INDICATION: 30-year-old female with history of esophageal cancer and breast cancer was found to have lung lesions concerning for metastatic disease and is referred for biopsy  CONTRAST: N/A FLUOROSCOPY TIME:   N/A IMAGES:  64 ANESTHESIA: Moderate sedation and local lidocaine PROCEDURE:  The patient was identified verbally and by wristband  Timeout was performed  Informed consent was obtained  Following obtaining informed consent, the patient was prepped and draped in the usual sterile fashion  All elements of maximal sterile barrier technique, cap and mask and sterile gloves and sterile drape and hand hygiene and 2% chlorhexidine for cutaneous antisepsis  Sterile ultrasound technique with sterile gel and sterile probe covers was also utilized  1% local lidocaine was infiltrated into skin and subcutaneous tissues overlying the left posterior chest wall  Under serial CT guidance, a 17-gauge coaxial introducer needle was advanced into a left lower lobe lung mass  18-gauge BioPince needle was advanced through the introducer needle and was used to obtain 4 core needle samples which were placed into formalin  Pathologist was present to confirm adequacy of samples  The needle was removed and bandage applied  Postprocedure CT was performed  The patient tolerated the procedure well without complication  The patient left the IR department in stable condition  Findings: 1  Successful CT-guided core needle biopsy of left lower lobe lung mass with 4 core needle samples placed into formalin   2   Postprocedure CT showed small left basilar pneumothorax  One hour post biopsy chest x-ray will be obtained  Impression: Impression: Successful CT-guided left lower lobe lung mass biopsy  Workstation performed: [id number]     Ct Abdomen Pelvis With Contrast    Result Date: 6/28/2019  Narrative: CT ABDOMEN AND PELVIS WITH IV CONTRAST INDICATION:   Abdominal pain, persistent nausea and vomiting  COMPARISON:  CT of abdomen and pelvis on June 7, 2019  TECHNIQUE:  CT examination of the abdomen and pelvis was performed  Axial, sagittal, and coronal 2D reformatted images were created from the source data and submitted for interpretation  Radiation dose length product (DLP) for this visit:  581 37 mGy-cm   This examination, like all CT scans performed in the [facility], was performed utilizing techniques to minimize radiation dose exposure, including the use of iterative  reconstruction and automated exposure control  IV Contrast:  100 mL of iohexol (OMNIPAQUE)  was administered intravenously without immediate adverse reaction  Enteric Contrast:  Enteric contrast was not administered  FINDINGS: ABDOMEN LOWER CHEST:  Redemonstrated pleural-based masses, areas of scarring/atelectasis and bronchiectatic changes  Increased patchy opacity within the right lower and middle lobes  Partially visualized central venous catheter with the superior vena cava  LIVER/BILIARY TREE:  Stable mild intrahepatic biliary ductal dilatation  GALLBLADDER:  No calcified gallstones  No pericholecystic inflammatory change  SPLEEN:  Unremarkable  PANCREAS:  Stable mildly prominent pancreatic duct measuring up to 4 mm in diameter  ADRENAL GLANDS:  Stable indication 1 cm left adrenal nodule  KIDNEYS/URETERS:  One or more sharply circumscribed subcentimeter renal hypodensities are noted  These lesions are too small to accurately characterize, but are statistically most likely to represent benign cortical renal cyst(s)    According to the guidelines published in the [facility] Paper of the ACR Incidental Findings Committee (Radiology 2010), no further workup of these lesions is recommended  No hydronephrosis  STOMACH AND BOWEL:  Status post gastric pull-through  No bowel obstruction  Thickening of predominantly the ascending, transverse and proximal descending colon  APPENDIX:  No findings to suggest appendicitis  ABDOMINOPELVIC CAVITY:  Trace free fluid in the pelvis  Mild mesenteric stranding  No free air  No significant lymphadenopathy by CT criteria  VESSELS:  Moderate to severe atherosclerotic calcifications  Moderate narrowing of the left common iliac and bilateral external iliac arteries  PELVIS REPRODUCTIVE ORGANS:  Unremarkable for patient's age  URINARY BLADDER:  Unremarkable  ABDOMINAL WALL/INGUINAL REGIONS:  Unremarkable  OSSEOUS STRUCTURES:  Erosive changes are seen within the lateral 10th and 9th ribs on the left similar to prior exam      Impression: 1  Redemonstrated pleural-based masses with stable erosive change within the left lateral 9th and 10th ribs compatible with metastasis  2   Redemonstrated bibasilar scarring and bronchiectatic changes with increased patchy opacity within the right lower and middle lobes which may represent superimposed pneumonia versus aspiration  3   Diffuse thickening of the colon compatible with nonspecific colitis  4   Stable 1 cm indeterminate left adrenal nodule  Workstation performed: [id number]     Ct Abdomen Pelvis With Contrast    Addendum Date: 6/7/2019 Addendum:   ADDENDUM: Additional finding as follows: A metastasis within the left costophrenic angle traverses the left hemidiaphragm and extends to the capsule of the spleen  Result Date: 6/7/2019  Narrative: CT ABDOMEN AND PELVIS WITH IV CONTRAST INDICATION:   acute on chronic abd pain, poss metastatic cancer  COMPARISON:  5/1/2019 TECHNIQUE:  CT examination of the abdomen and pelvis was performed   Axial, sagittal, and coronal 2D reformatted images were created from the source data and submitted for interpretation  Radiation dose length product (DLP) for this visit:  399 mGy-cm   This examination, like all CT scans performed in the [facility], was performed utilizing techniques to minimize radiation dose exposure, including the use of iterative reconstruction and automated exposure control  IV Contrast:  85 mL of iohexol (OMNIPAQUE) Enteric Contrast:  Enteric contrast was not administered  FINDINGS: ABDOMEN LOWER CHEST:  Again seen are pleural-based soft tissue nodules and thickening likely representing metastatic disease  There is a stable small left pleural effusion  There is a large hiatal hernia  LIVER/BILIARY TREE:  Unremarkable  GALLBLADDER:  No calcified gallstones  No pericholecystic inflammatory change  SPLEEN:  Unremarkable  PANCREAS:  Unremarkable  ADRENAL GLANDS:  There is an indeterminate subcentimeter left adrenal nodule  KIDNEYS/URETERS:  Unremarkable  No hydronephrosis  STOMACH AND BOWEL:  The transverse colon is completely collapsed though could be mildly thick-walled  There is no surrounding stranding, however  The remainder of the small large bowel is unremarkable  APPENDIX:  No findings to suggest appendicitis  ABDOMINOPELVIC CAVITY:  No ascites or free intraperitoneal air  No lymphadenopathy  VESSELS:  Unremarkable for patient's age  PELVIS REPRODUCTIVE ORGANS:  Unremarkable for patient's age  URINARY BLADDER:  Unremarkable  ABDOMINAL WALL/INGUINAL REGIONS:  Unremarkable  OSSEOUS STRUCTURES:  No acute fracture or destructive osseous lesion  Impression: 1  No definite acute inflammatory process in the abdomen or pelvis  Transverse colon is completely collapsed though could be mildly thick walled  No surrounding stranding is identified, however  Correlation for signs and symptoms of colitis recommended  2   No metastatic disease in the abdomen or pelvis   3   Stable probable pleural metastasis at the lung bases with small left pleural effusion  4   Large hiatal hernia  Workstation performed: [id number]     Ir Port Placement    Result Date: 6/21/2019  Narrative: Chest port placement  Clinical History: Esophageal cancer, widely metastatic  Plans for palliative chemotherapy  Patient has had a recent clinical decline and is currently admitted for a small left pneumothorax after lung biopsy  After discussion with the patient and oncology service, plans for port placement  Fluoro time: 57 seconds Number of Images: 4 Conscious sedation time: 30 minutes Technique: The patient was brought to the interventional radiology preprocedure area and identified verbally and by wristband  After discussion of the procedure and its details, risks, benefits, and alternatives both verbal and written informed consent was obtained from the patient and placed in the chart  The patient was then brought to the interventional radiology angiography suite and placed supine on the table  The right internal jugular vein was evaluated as a potential access site with ultrasound  The vessel was found to be patent and compressible  An 8-Omani Mini  port was selected for the procedure  The right neck and upper chest were prepped and draped in the usual sterile fashion  All elements of maximal sterile barrier technique were followed (cap, mask, sterile gown, sterile gloves, large sterile sheet, hand hygiene and 2% chlorhexidine for cutaneous antisepsis)  Sterile ultrasound technique with sterile gel and sterile probe covers were also utilized  A preprocedure timeout was performed per standard department protocol  Under ultrasound guidance, lidocaine was administered to the skin and a small skin incision was made  Under ultrasound guidance, the right internal jugular vein was accessed using single wall Seldinger technique  Static images of real time needle entry into the vessel were obtained   A 0 018 wire was then advanced through the needle into the central venous system  The needle was removed, and a 5 Indonesian coaxial dilator was inserted  Next, attention was turned to the right chest and a site for the port pocket was selected  This area was then anesthetized with lidocaine, as well as the projected tunnel  A subcutaneous pocket was created in the skin of the upper chest for the port reservoir utilizing a surgical incision  The port catheter was then tunneled under the skin of the upper chest  Attention was then turned to the 5-Indonesian coaxial dilator  The microwire and inner dilator were removed  A J wire was inserted through the outer dilator and a 9 Indonesian peel-away sheath was inserted over the wire  The catheter was advanced through the peel-away and the peel-away was removed  The catheter tip was then positioned in the superior cavoatrial junction under fluoroscopic control  The catheter was connected to the port, and the port inserted into the subcutaneous pocket  The port was then flushed and aspirated successfully and locked with saline  The pocket was closed with 3-0 Vicryl suture and Histoacryl  A sterile dressing was applied  The neck incision was closed with 4-0 Vicryl suture and Histoacryl  Findings: Successful ultrasound and fluoroscopically guided placement of an 8-Ind[place]ian Mini chest port via the right internal jugular vein  The tip of the catheter projects in the superior cavoatrial junction     Impression: Impression: Ultrasound and fluoroscopically guided placement of a power injectable right chest/internal jugular port Plan: Braxton Kidney is ready for immediate use   Workstation performed: [id number]         Assessment & Plan  Principal Problem:    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)  Active Problems:    Gastro-esophageal reflux disease without esophagitis    Seizure disorder (HCC)    Cancer associated pain    Multifocal pneumonia    Moderate protein-calorie malnutrition (HCC)    Hypokalemia    Intractable nausea and vomiting    She is a 81-year-old female with metastatic GE junction adenocarcinoma with chemotherapy-induced nausea and vomiting which appears to be slowing  She still reports extreme nausea but is having less vomiting  Continue scheduled Zofran  Continued scheduled Reglan  Continue scheduled Ativan  Liquid diet  Serial abdominal exam  Pulmonary optimization and treatment  Treatment of aspiration pneumonia  As she stabilizes she needs to have a greater p o   Intake    Oren Zepeda MD  7/4/2019,10:01 AM

## 2019-07-04 NOTE — PLAN OF CARE
Problem: Potential for Falls  Goal: Patient will remain free of falls  Description  INTERVENTIONS:  - Assess patient frequently for physical needs  -  Identify cognitive and physical deficits and behaviors that affect risk of falls    -  [place] fall precautions as indicated by assessment   - Educate patient/family on patient safety including physical limitations  - Instruct patient to call for assistance with activity based on assessment  - Modify environment to reduce risk of injury  - Consider OT/PT consult to assist with strengthening/mobility  Outcome: Progressing     Problem: PAIN - ADULT  Goal: Verbalizes/displays adequate comfort level or baseline comfort level  Description  Interventions:  - Encourage patient to monitor pain and request assistance  - Assess pain using appropriate pain scale  - Administer analgesics based on type and severity of pain and evaluate response  - Implement non-pharmacological measures as appropriate and evaluate response  - Consider cultural and social influences on pain and pain management  - Notify physician/advanced practitioner if interventions unsuccessful or patient reports new pain  Outcome: Progressing     Problem: INFECTION - ADULT  Goal: Absence or prevention of progression during hospitalization  Description  INTERVENTIONS:  - Assess and monitor for signs and symptoms of infection  - Monitor lab/diagnostic results  - Monitor all insertion sites, i e  indwelling lines, tubes, and drains  - Monitor endotracheal (as able) and nasal secretions for changes in amount and color  - [place] appropriate cooling/warming therapies per order  - Administer medications as ordered  - Instruct and encourage patient and family to use good hand hygiene technique  - Identify and instruct in appropriate isolation precautions for identified infection/condition  Outcome: Progressing     Problem: SAFETY ADULT  Goal: Patient will remain free of falls  Description  INTERVENTIONS:  - Assess patient frequently for physical needs  -  Identify cognitive and physical deficits and behaviors that affect risk of falls  -  [place] fall precautions as indicated by assessment   - Educate patient/family on patient safety including physical limitations  - Instruct patient to call for assistance with activity based on assessment  - Modify environment to reduce risk of injury  - Consider OT/PT consult to assist with strengthening/mobility  Outcome: Progressing     Problem: DISCHARGE PLANNING  Goal: Discharge to home or other facility with appropriate resources  Description  INTERVENTIONS:  - Identify barriers to discharge w/patient and caregiver  - Arrange for needed discharge resources and transportation as appropriate  - Identify discharge learning needs (meds, wound care, etc )  - Arrange for interpretive services to assist at discharge as needed  - Refer to Case Management Department for coordinating discharge planning if the patient needs post-hospital services based on physician/advanced practitioner order or complex needs related to functional status, cognitive ability, or social support system  Outcome: Progressing     Problem: Knowledge Deficit  Goal: Patient/family/caregiver demonstrates understanding of disease process, treatment plan, medications, and discharge instructions  Description  Complete learning assessment and assess knowledge base    Interventions:  - Provide teaching at level of understanding  - Provide teaching via preferred learning methods  Outcome: Progressing     Problem: RESPIRATORY - ADULT  Goal: Achieves optimal ventilation and oxygenation  Description  INTERVENTIONS:  - Assess for changes in respiratory status  - Assess for changes in mentation and behavior  - Position to facilitate oxygenation and minimize respiratory effort  - Oxygen administration by appropriate delivery method based on oxygen saturation (per order) or ABGs  - Initiate smoking cessation education as indicated  - Encourage broncho-pulmonary hygiene including cough, deep breathe, Incentive Spirometry  - Assess the need for suctioning and aspirate as needed  - Assess and instruct to report SOB or any respiratory difficulty  - Respiratory Therapy support as indicated  Outcome: Progressing     Problem: Nutrition/Hydration-ADULT  Goal: Nutrient/Hydration intake appropriate for improving, restoring or maintaining nutritional needs  Description  Monitor and assess patient's nutrition/hydration status for malnutrition (ex- brittle hair, bruises, dry skin, pale skin and conjunctiva, muscle wasting, smooth red tongue, and disorientation)  Collaborate with interdisciplinary team and initiate plan and interventions as ordered  Monitor patient's weight and dietary intake as ordered or per policy  Determine patient's food preferences and provide high-protein, high-caloric foods as appropriate       INTERVENTIONS:  - Monitor oral intake, urinary output, labs, and treatment plans  - Assess nutrition and hydration status and recommend course of action  - Evaluate amount of meals eaten  - Assist patient with eating if necessary   - Allow adequate time for meals  - Recommend/ encourage appropriate diets, oral nutritional supplements, and vitamin/mineral supplements  - Order, calculate, and assess calorie counts as needed  - Recommend, monitor, and adjust tube feedings and TPN/PPN based on assessed needs  - Assess need for intravenous fluids  - Provide nutrition/hydration education as appropriate  - Include patient/family/caregiver in decisions related to nutrition   Outcome: Progressing     Problem: Prexisting or High Potential for Compromised Skin Integrity  Goal: Skin integrity is maintained or improved  Description  INTERVENTIONS:  - Identify patients at risk for skin breakdown  - Assess and monitor skin integrity  - Assess and monitor nutrition and hydration status  - Monitor labs (i e  albumin)  - Assess for incontinence   - Turn and reposition patient  - Assist with mobility/ambulation  - Relieve pressure over bony prominences  - Avoid friction and shearing  - Provide appropriate hygiene as needed including keeping skin clean and dry  - Evaluate need for skin moisturizer/barrier cream  - Collaborate with interdisciplinary team (i e  Nutrition, Rehabilitation, etc )   - Patient/family teaching  Outcome: Progressing

## 2019-07-04 NOTE — PROGRESS NOTES
[facility] Internal Medicine Progress Note  Patient: Tia Res 60 y o  female   MRN: [id number]  PCP: Alek Montelongo MD  Unit/Bed#: -[facility] Encounter: [id number]  Date Of Visit: 07/04/19          * 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 ([facility] )  Assessment & Plan  I am not sure if patient be able to tolerate any further treatment because of her severely compromised nutritional status  I hope that Hematology/Oncology would discussed with patient about palliative care-hospice/comfort care    Hypomagnesemia  Assessment & Plan  Received IV magnesium  Better  Continue to monitor as needed    Intractable nausea and vomiting  Assessment & Plan  Secondary to esophageal cancer  Continues to do poor  Medications are being adjusted  She is hardly eating anything as per staff  I think Hematology/Oncology should discuss with patient about comfort care/hospice  Hypokalemia/hypernatremia  Assessment & Plan  Potassium is better  Sodium up  Change IV fluids  Follow-up labs as needed    Moderate protein-calorie malnutrition ([facility] )  Assessment & Plan  Malnutrition Findings:   Malnutrition type: Chronic illness  Degree of Malnutrition: Other severe protein calorie malnutritionAlbumin 2 7    BMI Findings:  BMI Classifications: Underweight < 18 5     Body mass index is 18 1 kg/m²  Still Not keeping anything down orally  ? Need for TPN  Multifocal pneumonia  Assessment & Plan  Continue with current treatment  Patient continues to have significant nausea and vomiting-making her high risk for aspiration        Cancer associated pain  Assessment & Plan  Continue with pain control    Seizure disorder [facility])  Assessment & Plan  Continue with IV Keppra    Gastro-esophageal reflux disease without esophagitis  Assessment & Plan  Continue with H2 blocker and PPI    Acute respiratory failure with hypoxia (HCC)-resolved as of 7/3/2019  Assessment & Plan  Secondary to multifocal pneumonia/aspiration pneumonia/pneumonitis  Continue with current treatment  Present on Admission:   [facility] disorder ([facility] )   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 ([facility] )   Multifocal pneumonia   Cancer associated pain   Gastro-esophageal reflux disease without esophagitis   Moderate protein-calorie malnutrition (HCC)   (Resolved) Acute respiratory failure with hypoxia (HCC)   Hypokalemia/hypernatremia   Intractable nausea and vomiting            VTE Pharmacologic Prophylaxis:   Pharmacologic: Enoxaparin (Lovenox)  Mechanical VTE Prophylaxis in Place: Yes    Patient Centered Rounds: I have performed bedside rounds with nursing staff today  Discussions with Specialists or Other Care Team Provider:  Yes    Education and Discussions with Family / Patient:  Yes    Time Spent for Care: 30+ minutes  More than 50% of total time spent on counseling and coordination of care as described above  Current Length of Stay: 5 day(s)    Current Patient Status: Inpatient   Certification Statement: The patient will continue to require additional inpatient hospital stay due to Aspiration pneumonia/pneumonitis/intractable nausea and vomiting    Discharge Plan: TBD    Code Status: Level 3 - DNAR and DNI      Subjective:   Patient is somewhat more sleepy today  She is still nauseous and as per staff throwing up  Not keeping much down  Objective:     Vitals:   Temp (24hrs), Av 7 °F (36 5 °C), Min:97 7 °F (36 5 °C), Max:97 7 °F (36 5 °C)    Temp:  [97 7 °F (36 5 °C)] 97 7 °F (36 5 °C)  HR:  [84-97] 86  Resp:  [18] 18  BP: (145)/(93) 145/93  SpO2:  [90 %-99 %] 97 %  Body mass index is 18 1 kg/m²  Input and Output Summary (last 24 hours): Intake/Output Summary (Last 24 hours) at 2019 1210  Last data filed at 2019 1115  Gross per 24 hour   Intake 2390 ml   Output --   Net 2390 ml           Physical Exam:     Vital signs reviewed as above  Patient in bed  Somewhat more sleepy and tired today    In very poor health easily visible muscle tendons/bones/ribs  Decreased breath sounds intensity with crackles bilaterally  Has 1 and S2 audible  Dry lips  She had her oxygen off and oxygenation went down  On putting oxygen back on, oxygen was coming up now  Abdomen is soft  Bowel sounds are audible  Skin is warm and dry  Pale skin and conjunctiva  Depressed    Additional Data:     Labs:    Results from last 7 days   Lab Units 07/03/19  0518   WBC Thousand/uL 9 39   HEMOGLOBIN g/dL 10 4*   HEMATOCRIT % 31 6*   PLATELETS Thousands/uL 220   NEUTROS PCT % 94*   LYMPHS PCT % 3*   MONOS PCT % 2*   EOS PCT % 0     Results from last 7 days   Lab Units 07/04/19  0603  07/03/19  0518   POTASSIUM mmol/L 4 6   < > 2 7*   CHLORIDE mmol/L 108   < > 103   CO2 mmol/L 29   < > 34*   BUN mg/dL 17   < > 16   CREATININE mg/dL 0 53*   < > 0 57*   CALCIUM mg/dL 9 9   < > 9 1   ALK PHOS U/L  --   --  65   ALT U/L  --   --  6*   AST U/L  --   --  12    < > = values in this interval not displayed  * I Have Reviewed All Lab Data Listed Above  * Additional Pertinent Lab Tests Reviewed:  All Labs Within Last 24 Hours Reviewed      Recent Cultures (last 7 days):           Last 24 Hours Medication List:     Current Facility-Administered Medications:  al mag oxide-diphenhydramine-lidocaine viscous 10 mL Swish & Swallow Q6H PRN Favian Foote MD    albuterol 2 5 mg Nebulization Q6H PRN Shey Altman MD    cefepime 1,000 mg Intravenous Q12H Vane Hall MD Last Rate: 1,000 mg (07/04/19 1134)   dexamethasone 4 mg Intravenous BID (AM & Afternoon) Giovanni Mena MD    dextrose 5 % and sodium chloride 0 45 % with KCl 20 mEq/L 75 mL/hr Intravenous Continuous Buffy Lemons MD Last Rate: 75 mL/hr (07/04/19 1016)   enoxaparin 40 mg Subcutaneous Daily Favian Foote MD    famotidine 20 mg Oral Daily Favian Foote MD    gabapentin 600 mg Oral BID Favian Foote MD    guaiFENesin 600 mg Oral Q12H [address] Favian Foote MD    HYDROmorphone 1 mg Intravenous Q4H Enzo Avalos MD    HYDROmorphone 1 mg Intravenous Q3H PRN Enzo Avalos MD    ipratropium 0 5 mg Nebulization TID Aguila Artis MD    levalbuterol 1 25 mg Nebulization TID Aguila Artis MD    levETIRAcetam 500 mg Intravenous Q12H [facility] Aguila Artis MD Last Rate: Stopped (07/04/19 0900)   LORazepam 0 5 mg Intravenous Q6H PRN Enzo Avalos MD    LORazepam 0 5 mg Intravenous Q6H Enzo Avalos, MD    magnesium sulfate 2 g Intravenous Daily PRN Harley Mack MD    metoclopramide 10 mg Intravenous Q6H [facility] Fabiano Crenshaw III, MD    metroNIDAZOLE 500 mg Intravenous Q8H Vangie Maier PA-C Last Rate: Stopped (07/04/19 2875)   ondansetron 8 mg Intravenous Q8H Harley Mack MD Last Rate: Stopped (07/04/19 1115)   pantoprazole 40 mg Intravenous Q12H [facility] Skye Coppola PA-C    potassium chloride 20 mEq Oral 6x Daily Nydia Junior MD    promethazine 25 mg Intravenous Q6H PRN Enzo Avalos MD    saccharomyces boulardii 250 mg Oral BID French Monique MD    senna 3 tablet Oral HS French Monique MD         Today, Patient Was Seen By: Harley Mack MD    ** Please Note: Dragon 360 Dictation voice to text software may have been used in the creation of this document   **

## 2019-07-04 NOTE — UTILIZATION REVIEW
Continued Stay Review    Date: 7/4                        Current Patient Class: Inpatient  Current Level of Care: Med surg     HPI:64 y o  female initially admitted on 6/29 @ 1124     Assessment/Plan:   Patient continues to have significant nausea and vomiting-making her high risk for aspiration  Continues IV Keppra for seizure disorder  Magnesium and potassium now improved  Pt may not tolerate further XRT, chemo treatment of esophageal CA due to severely compromised nutritional status  Consider palliative, hospice care      Infectious disease 7/4 Multifocal pneumonia, involving right side   Given intractable nausea and vomiting, this is most likely aspiration pneumonia   Patient is clinically improved on IV antibiotics   Temperature is down   WBC normalized  Continue IV cefepime/Flagyl  Day #6   Monitor temperature/WBC  Monitor respiratory symptoms  Treat x7 days total, through tomorrow  Gastroenterology 7/4 Pt continues to have extreme nausea  Vomiting less   Continue scheduled Zofran, Reglan and Ativan, Liquid diet Serial abdominal exams     Pertinent Labs/Diagnostic Results:   Results from last 7 days   Lab Units 07/03/19  0518 07/02/19  0513 07/01/19  1311 06/30/19  0518 06/29/19  0642 06/28/19  1917   WBC Thousand/uL 9 39 13 47* 12 38* 7 65 16 49* 15 21*   HEMOGLOBIN g/dL 10 4* 11 0* 11 2* 12 1 11 8 12 1   HEMATOCRIT % 31 6* 32 8* 32 8* 35 5 36 3 36 2   PLATELETS Thousands/uL 220 267 281 376 373 400*   NEUTROS ABS Thousands/µL 8 82* 12 71* 11 81* 7 16  --   --    BANDS PCT %  --   --   --   --  4 4         Results from last 7 days   Lab Units 07/04/19  0603 07/03/19  1315 07/03/19  0518 07/02/19  1345  06/30/19  1156 06/30/19  0518  06/28/19  1917   SODIUM mmol/L 147* 145 145 140   < > 137 135*   < > 138   POTASSIUM mmol/L 4 6 3 3* 2 7* 2 7*   < > 2 7*  2 7* 2 6*   < > 3 4*   CHLORIDE mmol/L 108 105 103 100   < > 98* 97*   < > 98*   CO2 mmol/L 29 32 34* 36*   < > 28 27   < > 28   ANION GAP mmol/L 10 8 8 4   < > 11 11   < > 12   BUN mg/dL 17 18 16 11   < > 11 9   < > 10   CREATININE mg/dL 0 53* 0 63 0 57* 0 57*   < > 0 59* 0 61   < > 0 72   EGFR ml/min/1 73sq m 102 96 100 100   < > 99 97   < > 90   CALCIUM mg/dL 9 9 9 3 9 1 9 4   < > 9 8 9 6   < > 10 1   MAGNESIUM mg/dL 1 9 1 5*  --   --   --   --  1 7  --  1 4*   PHOSPHORUS mg/dL  --   --   --   --   --  2 2*  --   --   --     < > = values in this interval not displayed       Results from last 7 days   Lab Units 07/03/19  0518 06/30/19  0518 06/28/19 1917   AST U/L 12 27 14   ALT U/L 6* 8* 8*   ALK PHOS U/L 65 76 84   TOTAL PROTEIN g/dL 5 9* 6 9 7 4   ALBUMIN g/dL 2 0* 2 4* 2 7*   TOTAL BILIRUBIN mg/dL 0 40 0 70 0 40   BILIRUBIN DIRECT mg/dL  --   --  0 10         Results from last 7 days   Lab Units 07/04/19  0603 07/03/19  1315 07/03/19  0518 07/02/19  1345 07/02/19  0513 07/01/19  1311 06/30/19  1156 06/30/19  0518 06/29/19  0642 06/28/19  1917   GLUCOSE RANDOM mg/dL 128 117 116 90 101 111 115 117 103 121       Results from last 7 days   Lab Units 06/30/19  0518 06/28/19  1917   LACTIC ACID mmol/L 1 5 1 2           Results from last 7 days   Lab Units 06/28/19  1902   CLARITY UA  Clear   COLOR UA  Yellow   SPEC GRAV UA  1 020   PH UA  6 5   GLUCOSE UA mg/dl Negative   KETONES UA mg/dl Negative   BLOOD UA  Negative   PROTEIN UA mg/dl Trace*   NITRITE UA  Negative   BILIRUBIN UA  Negative   UROBILINOGEN UA E U /dl 0 2   LEUKOCYTES UA  Negative   WBC UA /hpf 1-2*   RBC UA /hpf None Seen   BACTERIA UA /hpf Occasional   EPITHELIAL CELLS WET PREP /hpf Occasional   MUCUS THREADS  Occasional*           Vital Signs:    T                   P                  R          BP            O2 sat   07/04/19 0725  97 7 °F (36 5 °C)  90  18  145/93  98 %  --         Medications:   Scheduled Meds:   Current Facility-Administered Medications:  al mag oxide-diphenhydramine-lidocaine viscous 10 mL Swish & Swallow Q6H PRN    albuterol 2 5 mg Nebulization Q6H PRN    cefepime 1,000 mg Intravenous Q12H Last Rate: 1,000 mg (07/04/19 1134)   dexamethasone 4 mg Intravenous BID (AM & Afternoon)    dextrose 5 % and sodium chloride 0 45 % with KCl 20 mEq/L 75 mL/hr Intravenous Continuous Last Rate: 75 mL/hr (07/04/19 1016)   enoxaparin 40 mg Subcutaneous Daily    famotidine 20 mg Oral Daily    gabapentin 600 mg Oral BID    guaiFENesin 600 mg Oral Q12H [address]    HYDROmorphone 1 mg Intravenous Q4H    HYDROmorphone 1 mg Intravenous Q3H PRN    ipratropium 0 5 mg Nebulization TID    levalbuterol 1 25 mg Nebulization TID    levETIRAcetam 500 mg Intravenous Q12H [address] Last Rate: Stopped (07/04/19 0900)   LORazepam 0 5 mg Intravenous Q6H PRN    LORazepam 0 5 mg Intravenous Q6H    magnesium sulfate 2 g Intravenous Daily PRN    metoclopramide 10 mg Intravenous Q6H [address]    metroNIDAZOLE 500 mg Intravenous Q8H Last Rate: Stopped (07/04/19 0952)   ondansetron 8 mg Intravenous Q8H Last Rate: Stopped (07/04/19 1115)   pantoprazole 40 mg Intravenous Q12H [address]    potassium chloride 20 mEq Oral 6x Daily    promethazine 25 mg Intravenous Q6H PRN    saccharomyces boulardii 250 mg Oral BID    senna 3 tablet Oral HS        Discharge Plan: D     Network Utilization Review Department  Phone: [phone]; Fax [phone]  [email or web address]  ATTENTION: Please call with any questions or concerns to [phone]  and carefully listen to the prompts so that you are directed to the right person  Send all requests for admission clinical reviews, approved or denied determinations and any other requests to fax [phone]   All voicemails are confidential

## 2019-07-05 NOTE — PROGRESS NOTES
GI Progress Note - Emily Montero 60 y o  female MRN: [id number]    Unit/Bed#: -01 Encounter: [id number]    Subjective: Ms Nany Streeter is currently resting comfortably and sleeping  Discussed with RN, she continues to have vomiting but typically only after PO intake  The patient is sipping water often for comfort but then vomits  Objective:     Vitals: Blood pressure 125/79, pulse 90, temperature 97 7 °F (36 5 °C), resp  rate 17, height 5' 2" (1 575 m), weight 44 9 kg (98 lb 15 8 oz), SpO2 (!) 88 %  ,Body mass index is 18 1 kg/m²  Intake/Output Summary (Last 24 hours) at 7/5/2019 1251  Last data filed at 7/5/2019 3771  Gross per 24 hour   Intake 420 ml   Output 150 ml   Net 270 ml       Physical Exam:     General Appearance: Sleeping, chronically ill and cachectic appearing  Lungs: Clear to auscultation bilaterally, no rales or rhonchi, no labored breathing/accessory muscle use  Heart: Regular rate and rhythm, S1, S2 normal, no murmur, click, rub or gallop  Abdomen: Soft, non-tender, non-distended; bowel sounds normal; no masses or no organomegaly  Extremities: No cyanosis or LE edema    Invasive Devices     Central Venous Catheter Line            Port A Cath 06/21/19 Right Chest 13 days                Lab Results:  Results from last 7 days   Lab Units 07/05/19  0855 07/03/19  0518   WBC Thousand/uL 9 29 9 39   HEMOGLOBIN g/dL 10 7* 10 4*   HEMATOCRIT % 32 8* 31 6*   PLATELETS Thousands/uL 231 220   NEUTROS PCT %  --  94*   LYMPHS PCT %  --  3*   MONOS PCT %  --  2*   EOS PCT %  --  0     Results from last 7 days   Lab Units 07/05/19  0855  07/03/19  0518   POTASSIUM mmol/L 3 4*   < > 2 7*   CHLORIDE mmol/L 103   < > 103   CO2 mmol/L 34*   < > 34*   BUN mg/dL 14   < > 16   CREATININE mg/dL 0 58*   < > 0 57*   CALCIUM mg/dL 9 2   < > 9 1   ALK PHOS U/L  --   --  65   ALT U/L  --   --  6*   AST U/L  --   --  12    < > = values in this interval not displayed                 Imaging Studies: I have personally reviewed pertinent imaging studies  Xr Chest 2 Views  Result Date: 6/29/2019  Impression: Bilateral multilobar pneumonia Workstation performed: [id number]     Ct Abdomen Pelvis With Contrast  Result Date: 6/28/2019  Impression: 1  Redemonstrated pleural-based masses with stable erosive change within the left lateral 9th and 10th ribs compatible with metastasis  2   Redemonstrated bibasilar scarring and bronchiectatic changes with increased patchy opacity within the right lower and middle lobes which may represent superimposed pneumonia versus aspiration  3   Diffuse thickening of the colon compatible with nonspecific colitis  4   Stable 1 cm indeterminate left adrenal nodule         Assessment and Plan:    Nausea/Vomiting  Hx Metastatic Esophageal Cancer s/p Esophagectomy and Gastric Pull Through  - Chemotherapy induced nausea/vomiting as this occurred in the middle of her chemotherapy cycle  - Minimal improvement with scheduled zofran, reglan, decadron  - Has ativan and phenergan PRN  - Consider trial of Tigan  - Replete electrolytes  - Continue palliative care follow up  - Continue treatment of aspiration pneumonia though flagyl could be exacerbating nausea/vomiting, would consider trialing an alternative if possible to alleviate her symptoms  - EGD in April 2019 which showed normal gastric pull through anatomy, biopsies were neg for malignancy at that time so repeat EGD would likely be of low yield and high risk given her current pneumonia      The patient will be seen by Dr Tejada

## 2019-07-05 NOTE — PROGRESS NOTES
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 [facility])  Assessment & Plan  Follow-up with oncology service  Hypomagnesemia  Assessment & Plan  Received IV magnesium  Better and again low today  Replete and Continue to monitor as needed    Hypokalemia/hypernatremia  Assessment & Plan  Potassium is again low  Would add more potassium in her IV fluids and also give her a Piggy bag IV potassium    Moderate protein-calorie malnutrition (HCC)  Assessment & Plan  Malnutrition Findings:   Malnutrition type: Chronic illness  Degree of Malnutrition: Other severe protein calorie malnutritionAlbumin 2 7    BMI Findings:  BMI Classifications: Underweight < 18 5     Body mass index is 18 1 kg/m²  Still Not keeping anything down orally  I doubt that she would benefit from TPN  Multifocal pneumonia  Assessment & Plan  Continue with current treatment  Patient continues to have significant nausea and vomiting-making her high risk for aspiration  Cancer associated pain  Assessment & Plan  Continue with pain control    Seizure disorder [facility])  Assessment & Plan  Continue with IV Keppra    Gastro-esophageal reflux disease without esophagitis  Assessment & Plan  Continue with H2 blocker and PPI    Acute respiratory failure with hypoxia (HCC)-resolved as of 7/3/2019  Assessment & Plan  Secondary to multifocal pneumonia/aspiration pneumonia/pneumonitis  Continue with current treatment     Hypernatremia:  Resolved    Present on Admission:   Seizure disorder ([address] )   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)   Multifocal pneumonia   Cancer associated pain   Gastro-esophageal reflux disease without esophagitis   Moderate protein-calorie malnutrition (HCC)   (Resolved) Acute respiratory failure with hypoxia (HCC)   Hypokalemia/hypernatremia   Intractable nausea and vomiting     Note:  As per patient, they are having a family meeting with palliative Care service  ?  Talking about hospice care        VTE Pharmacologic Prophylaxis:   Pharmacologic: Enoxaparin (Lovenox)  Mechanical VTE Prophylaxis in Place: Yes    Patient Centered Rounds: I have performed bedside rounds with nursing staff today  Discussions with Specialists or Other Care Team Provider:  Yes    Education and Discussions with Family / Patient:  Yes    Time Spent for Care: 33+ minutes  More than 50% of total time spent on counseling and coordination of care as described above  Current Length of Stay: 6 day(s)    Current Patient Status: Inpatient   Certification Statement: The patient will continue to require additional inpatient hospital stay due to Intractable nausea and vomiting/respiratory failure/IV antibiotics/fluids/hypokalemia    Discharge Plan:  TBD    Code Status: Level 3 - DNAR and DNI      Subjective:   Can hardly talk  She try to have a sip of water so that she could talk, she started throwing it up as soon as she try to swallow it and then started gagging on it  After taking some time to clear it, she was able to tell me that they are going have a family meeting with palliative Care service today  As per staff, patient has not kept anything down orally  Objective:     Vitals:   Temp (24hrs), Av 7 °F (36 5 °C), Min:97 7 °F (36 5 °C), Max:97 7 °F (36 5 °C)    Temp:  [97 7 °F (36 5 °C)] 97 7 °F (36 5 °C)  HR:  [69-95] 90  Resp:  [17] 17  BP: (125-132)/(79-88) 125/79  SpO2:  [88 %-100 %] 88 %  Body mass index is 18 1 kg/m²  Input and Output Summary (last 24 hours):        Intake/Output Summary (Last 24 hours) at 2019 1209  Last data filed at 2019 0953  Gross per 24 hour Intake 420 ml   Output 150 ml   Net 270 ml           Physical Exam:     Vital signs reviewed as above  In bed and continues to be in respiratory distress and constantly throwing up  S1 and S2 audible  Coarse crackles bilaterally on auscultation  Quite cachectic female with easily visible muscle tendons/bones/ribs  Abdomen in general it is soft  Bowel sounds are audible  Awake and alert  Very weak Pena  Depressed  No cyanosis or clubbing  Conjunctivae and skin are pale  Oropharynx are dry  Has oxygen on, however, when she takes her oxygen off, her oxygenation dips down into 80s    Additional Data:     Labs:    Results from last 7 days   Lab Units 07/05/19  0855 07/03/19  0518   WBC Thousand/uL 9 29 9 39   HEMOGLOBIN g/dL 10 7* 10 4*   HEMATOCRIT % 32 8* 31 6*   PLATELETS Thousands/uL 231 220   NEUTROS PCT %  --  94*   LYMPHS PCT %  --  3*   MONOS PCT %  --  2*   EOS PCT %  --  0     Results from last 7 days   Lab Units 07/05/19  0855  07/03/19 0518   POTASSIUM mmol/L 3 4*   < > 2 7*   CHLORIDE mmol/L 103   < > 103   CO2 mmol/L 34*   < > 34*   BUN mg/dL 14   < > 16   CREATININE mg/dL 0 58*   < > 0 57*   CALCIUM mg/dL 9 2   < > 9 1   ALK PHOS U/L  --   --  65   ALT U/L  --   --  6*   AST U/L  --   --  12    < > = values in this interval not displayed  * I Have Reviewed All Lab Data Listed Above  * Additional Pertinent Lab Tests Reviewed:  All Labs Within Last 24 Hours Reviewed              Last 24 Hours Medication List:     Current Facility-Administered Medications:  al mag oxide-diphenhydramine-lidocaine viscous 10 mL Swish & Swallow Q6H PRN Favian Foote MD    albuterol 2 5 mg Nebulization Q6H PRN Enid Conway MD    cefepime 1,000 mg Intravenous Q12H Jayro Kimball MD Last Rate: 1,000 mg (07/05/19 1157)   dexamethasone 4 mg Intravenous BID (AM & Afternoon) Jamie Mitchell MD    dextrose 5 % and sodium chloride 0 45 % with KCl 40 mEq/L 75 mL/hr Intravenous Continuous Georgina Parra MD    enoxaparin 40 mg Subcutaneous Daily Favian Foote MD    famotidine 20 mg Oral Daily Favian Foote MD    gabapentin 600 mg Oral BID Favian Foote MD    guaiFENesin 600 mg Oral Q12H [address] Favian Foote MD    HYDROmorphone 1 mg Intravenous Q4H Krystle Phelps MD    HYDROmorphone 1 mg Intravenous Q3H PRN Krystle Phelps MD    ipratropium 0 5 mg Nebulization TID Shanae Mroales MD    levalbuterol 1 25 mg Nebulization TID Shanae Morales MD    levETIRAcetam 500 mg Intravenous Q12H [address] Shanae Morales MD Last Rate: 500 mg (07/05/19 0858)   LORazepam 0 5 mg Intravenous Q6H PRN Krystle Phelps MD    LORazepam 0 5 mg Intravenous Q6H Krystle Phelps MD    magnesium sulfate 2 g Intravenous Daily PRN Enmanuel Hand MD    magnesium sulfate 2 g Intravenous Once Enmanuel Hand MD    metoclopramide 10 mg Intravenous Q6H Belinda KIRK MD    metroNIDAZOLE 500 mg Intravenous Q8H Natasha Diego PA-C Last Rate: 500 mg (07/05/19 0954)   ondansetron 8 mg Intravenous Q8H Enmanuel Hand MD Last Rate: 8 mg (07/05/19 1116)   pantoprazole 40 mg Intravenous Q12H [address] Skye Coppola PA-C    potassium chloride 20 mEq Intravenous Once Enmanuel Hand MD    promethazine 25 mg Intravenous Q6H PRN Krystle Phelps MD    saccharomyces boulardii 250 mg Oral BID Jana West MD    senna 3 tablet Oral HS Jana West MD         Today, Patient Was Seen By: Enmanuel Hand MD    ** Please Note: Dragon 360 Dictation voice to text software may have been used in the creation of this document   **

## 2019-07-05 NOTE — PHYSICAL THERAPY NOTE
Physical Therapy Cancellation Note    PT order received  Chart review performed  At this time, PT evaluation cancelled per discussion with OT Ricky Winchester Corporal approached pt bedside- reports pt is declining mobility trial 2* vomiting, per pt report, "If I try to sit up, I vomit"  PT will follow and evaluate as appropriate      Malissa Benitez, PT, DPT

## 2019-07-05 NOTE — PLAN OF CARE
Problem: Potential for Falls  Goal: Patient will remain free of falls  Description  INTERVENTIONS:  - Assess patient frequently for physical needs  -  Identify cognitive and physical deficits and behaviors that affect risk of falls    -  [place] fall precautions as indicated by assessment   - Educate patient/family on patient safety including physical limitations  - Instruct patient to call for assistance with activity based on assessment  - Modify environment to reduce risk of injury  - Consider OT/PT consult to assist with strengthening/mobility  Outcome: Progressing     Problem: PAIN - ADULT  Goal: Verbalizes/displays adequate comfort level or baseline comfort level  Description  Interventions:  - Encourage patient to monitor pain and request assistance  - Assess pain using appropriate pain scale  - Administer analgesics based on type and severity of pain and evaluate response  - Implement non-pharmacological measures as appropriate and evaluate response  - Consider cultural and social influences on pain and pain management  - Notify physician/advanced practitioner if interventions unsuccessful or patient reports new pain  Outcome: Progressing     Problem: INFECTION - ADULT  Goal: Absence or prevention of progression during hospitalization  Description  INTERVENTIONS:  - Assess and monitor for signs and symptoms of infection  - Monitor lab/diagnostic results  - Monitor all insertion sites, i e  indwelling lines, tubes, and drains  - Monitor endotracheal (as able) and nasal secretions for changes in amount and color  - [place] appropriate cooling/warming therapies per order  - Administer medications as ordered  - Instruct and encourage patient and family to use good hand hygiene technique  - Identify and instruct in appropriate isolation precautions for identified infection/condition  Outcome: Progressing     Problem: SAFETY ADULT  Goal: Patient will remain free of falls  Description  INTERVENTIONS:  - Assess patient frequently for physical needs  -  Identify cognitive and physical deficits and behaviors that affect risk of falls  -  [place] fall precautions as indicated by assessment   - Educate patient/family on patient safety including physical limitations  - Instruct patient to call for assistance with activity based on assessment  - Modify environment to reduce risk of injury  - Consider OT/PT consult to assist with strengthening/mobility  Outcome: Progressing     Problem: DISCHARGE PLANNING  Goal: Discharge to home or other facility with appropriate resources  Description  INTERVENTIONS:  - Identify barriers to discharge w/patient and caregiver  - Arrange for needed discharge resources and transportation as appropriate  - Identify discharge learning needs (meds, wound care, etc )  - Arrange for interpretive services to assist at discharge as needed  - Refer to Case Management Department for coordinating discharge planning if the patient needs post-hospital services based on physician/advanced practitioner order or complex needs related to functional status, cognitive ability, or social support system  Outcome: Progressing     Problem: Knowledge Deficit  Goal: Patient/family/caregiver demonstrates understanding of disease process, treatment plan, medications, and discharge instructions  Description  Complete learning assessment and assess knowledge base    Interventions:  - Provide teaching at level of understanding  - Provide teaching via preferred learning methods  Outcome: Progressing     Problem: RESPIRATORY - ADULT  Goal: Achieves optimal ventilation and oxygenation  Description  INTERVENTIONS:  - Assess for changes in respiratory status  - Assess for changes in mentation and behavior  - Position to facilitate oxygenation and minimize respiratory effort  - Oxygen administration by appropriate delivery method based on oxygen saturation (per order) or ABGs  - Initiate smoking cessation education as indicated  - Encourage broncho-pulmonary hygiene including cough, deep breathe, Incentive Spirometry  - Assess the need for suctioning and aspirate as needed  - Assess and instruct to report SOB or any respiratory difficulty  - Respiratory Therapy support as indicated  Outcome: Progressing     Problem: Nutrition/Hydration-ADULT  Goal: Nutrient/Hydration intake appropriate for improving, restoring or maintaining nutritional needs  Description  Monitor and assess patient's nutrition/hydration status for malnutrition (ex- brittle hair, bruises, dry skin, pale skin and conjunctiva, muscle wasting, smooth red tongue, and disorientation)  Collaborate with interdisciplinary team and initiate plan and interventions as ordered  Monitor patient's weight and dietary intake as ordered or per policy  Determine patient's food preferences and provide high-protein, high-caloric foods as appropriate       INTERVENTIONS:  - Monitor oral intake, urinary output, labs, and treatment plans  - Assess nutrition and hydration status and recommend course of action  - Evaluate amount of meals eaten  - Assist patient with eating if necessary   - Allow adequate time for meals  - Recommend/ encourage appropriate diets, oral nutritional supplements, and vitamin/mineral supplements  - Order, calculate, and assess calorie counts as needed  - Recommend, monitor, and adjust tube feedings and TPN/PPN based on assessed needs  - Assess need for intravenous fluids  - Provide nutrition/hydration education as appropriate  - Include patient/family/caregiver in decisions related to nutrition   Outcome: Progressing     Problem: Prexisting or High Potential for Compromised Skin Integrity  Goal: Skin integrity is maintained or improved  Description  INTERVENTIONS:  - Identify patients at risk for skin breakdown  - Assess and monitor skin integrity  - Assess and monitor nutrition and hydration status  - Monitor labs (i e  albumin)  - Assess for incontinence   - Turn and reposition patient  - Assist with mobility/ambulation  - Relieve pressure over bony prominences  - Avoid friction and shearing  - Provide appropriate hygiene as needed including keeping skin clean and dry  - Evaluate need for skin moisturizer/barrier cream  - Collaborate with interdisciplinary team (i e  Nutrition, Rehabilitation, etc )   - Patient/family teaching  Outcome: Progressing

## 2019-07-05 NOTE — UTILIZATION REVIEW
Continued Stay Review    Date: 7/5/19                        Current Patient Class: IP  Current Level of Care: MED SURG    HPI:64 y o  female initially admitted on 6/28 with metastatic esophageal cancer undergoing palliative chemotherapy with FOLFox with intractable N/V    Assessment/Plan: PT NOT ABLE TO KEEP ANYTHING DOWN ORALLY    IMMEDIATELY GAGS AND VOMITS WITH SIPS OF WATER    Chemotherapy induced nausea/vomiting as this occurred in the middle of her chemotherapy cycle  - Minimal improvement with scheduled zofran, reglan, decadron  - Has ativan and phenergan PRN  - Consider trial of Tigan  - Replete electrolytes  - Continue palliative care follow up  - Continue treatment of aspiration pneumonia though flagyl could be exacerbating nausea/vomiting, would consider trialing an alternative if possible to alleviate her symptoms   - analgesics prn  - continue IV abx for multifocal PNA, monitor for recurrent aspiration  - family meeting with palliative care to discuss hospice       Pertinent Labs/Diagnostic Results:   Results from last 7 days   Lab Units 07/05/19  0855 07/03/19  0518 07/02/19  0513 07/01/19  1311 06/30/19  0518 06/29/19  0642 06/28/19  1917   WBC Thousand/uL 9 29 9 39 13 47* 12 38* 7 65 16 49* 15 21*   HEMOGLOBIN g/dL 10 7* 10 4* 11 0* 11 2* 12 1 11 8 12 1   HEMATOCRIT % 32 8* 31 6* 32 8* 32 8* 35 5 36 3 36 2   PLATELETS Thousands/uL 231 220 267 281 376 373 400*   NEUTROS ABS Thousands/µL  --  8 82* 12 71* 11 81* 7 16  --   --    BANDS PCT %  --   --   --   --   --  4 4         Results from last 7 days   Lab Units 07/05/19  0855 07/04/19  0603 07/03/19  1315 07/03/19  0518 06/30/19  1156 06/30/19  0518   SODIUM mmol/L 142 147* 145 145 137 135*   POTASSIUM mmol/L 3 4* 4 6 3 3* 2 7* 2 7*  2 7* 2 6*   CHLORIDE mmol/L 103 108 105 103 98* 97*   CO2 mmol/L 34* 29 32 34* 28 27   ANION GAP mmol/L 5 10 8 8 11 11   BUN mg/dL 14 17 18 16 11 9   CREATININE mg/dL 0 58* 0 53* 0 63 0 57* 0 59* 0 61   EGFR ml/min/1 73sq m 99 102 96 100 99 97   CALCIUM mg/dL 9 2 9 9 9 3 9 1 9 8 9 6   MAGNESIUM mg/dL 1 4* 1 9 1 5*  --   --  1 7   PHOSPHORUS mg/dL  --   --   --   --  2 2*  --      Results from last 7 days   Lab Units 07/03/19  0518 06/30/19  0518 06/28/19 1917   AST U/L 12 27 14   ALT U/L 6* 8* 8*   ALK PHOS U/L 65 76 84   TOTAL PROTEIN g/dL 5 9* 6 9 7 4   ALBUMIN g/dL 2 0* 2 4* 2 7*   TOTAL BILIRUBIN mg/dL 0 40 0 70 0 40   BILIRUBIN DIRECT mg/dL  --   --  0 10     Results from last 7 days   Lab Units 07/05/19  0855 07/04/19  0603 07/03/19  1315 07/03/19  0518 07/02/19  1345 07/02/19  0513 07/01/19  1311 06/30/19  1156 06/30/19  0518 06/29/19  0642   GLUCOSE RANDOM mg/dL 158* 128 117 116 90 101 111 115 117 103     Results from last 7 days   Lab Units 06/30/19  0518 06/28/19 1917   LACTIC ACID mmol/L 1 5 1 2     Results from last 7 days   Lab Units 06/28/19  1902   CLARITY UA  Clear   COLOR UA  Yellow   SPEC GRAV UA  1 020   PH UA  6 5   GLUCOSE UA mg/dl Negative   KETONES UA mg/dl Negative   BLOOD UA  Negative   PROTEIN UA mg/dl Trace*   NITRITE UA  Negative   BILIRUBIN UA  Negative   UROBILINOGEN UA E U /dl 0 2   LEUKOCYTES UA  Negative   WBC UA /hpf 1-2*   RBC UA /hpf None Seen   BACTERIA UA /hpf Occasional   EPITHELIAL CELLS WET PREP /hpf Occasional   MUCUS THREADS  Occasional*     Vital Signs:   /Time  Temp  Pulse  Resp  BP  SpO2  O2 Device   07/05/19 1310  --  --  --  --  91 %  Nasal cannula   07/05/19 0743  --  90  --  --  88 %Abnormal   --   07/05/19 0733  --  78  --  --  98 %       Medications:   Scheduled Meds:   Current Facility-Administered Medications:  al mag oxide-diphenhydramine-lidocaine viscous 10 mL Swish & Swallow Q6H PRN   albuterol 2 5 mg Nebulization Q6H PRN   cefepime 1,000 mg Intravenous Q12H   dexamethasone 4 mg Intravenous BID (AM & Afternoon)   dextrose 5 % and sodium chloride 0 45 % with KCl 40 mEq/L 75 mL/hr Intravenous Continuous   enoxaparin 40 mg Subcutaneous Daily   HYDROmorphone 1 mg Intravenous Q3H   HYDROmorphone 1 mg Intravenous Q2H PRN   levalbuterol 1 25 mg Nebulization TID   levETIRAcetam 500 mg Intravenous Q12H [address]   LORazepam 0 5 mg Intravenous Q6H   LORazepam 0 5 mg Intravenous Q4H PRN   magnesium sulfate 2 g Intravenous Daily PRN   metoclopramide 10 mg Intravenous Q6H [address]   metroNIDAZOLE 500 mg Intravenous Q8H   ondansetron 8 mg Intravenous Q8H   pantoprazole 40 mg Intravenous Q12H MAGNOLIA   promethazine 25 mg Intravenous Q6H PRN 7/4 x1   saccharomyces boulardii 250 mg Oral BID   senna 3 tablet Oral HS       Discharge Plan: TBD    Network Utilization Review Department  Phone: [phone]; Fax [phone]  [email or web address]  ATTENTION: Please call with any questions or concerns to [phone]  and carefully listen to the prompts so that you are directed to the right person  Send all requests for admission clinical reviews, approved or denied determinations and any other requests to fax [phone]   All voicemails are confidential

## 2019-07-05 NOTE — OCCUPATIONAL THERAPY NOTE
Occupational Therapy Cancellation Note        Patient Name: Tanner Morejon  YQBMS'O Date: 7/5/2019       OT order received  Chart review performed  At this time, OT evaluation cancelled, therapist  approached pt bedside,  pt is declining therapy assessment/ mobility trial 2* vomiting, per pt report, "If I try to sit up, I vomit"  OT will follow and evaluate as appropriate

## 2019-07-05 NOTE — PROGRESS NOTES
Progress Note -  Hematology/Oncology   Elba Enciso 60 y o  female MRN: [id number]  Unit/Bed#: -01 Encounter: [id number]    HPI: Elba Enciso is a 60y o  year old female with a history of esophageal carcinoma, on chemotherapy, who was admitted 6/28 for intractable nausea and emesis  Please see Dr Bhavani Negrete initial hospital oncology consult note dated 7/1 for details    Assessment/Plan:   #1Squamous Cell Carcinoma of the esophagus  Metastatic disease currently on chemotherapy with modified FOLFOX-6 regimen  She has tolerated very poorly suspect due to toxicity of chemotherapy + her overall poor nutritional and functional status  Nausea and dry heaves have been persistent this admission  Last dose of chemotherapy was 6/26, I anticipate symptom improvement with 10 days of hospital care  MRI brain could be considered if she continues w/symptoms, however it is unlikely to reveal etiology of brain metastasis with this disease  I discussed the role of hospice transition due to her continued decline and poor performance status  I discussed she is not a good candidate for continued chemotherapy unless a significant improvement of performance status  She understands our concern with her poor quality of life but is not interested in discussion further at this time but will discuss at a later time when she feels better  I verbalized to her I respect her wishes and she was appreciative  Discussed with the primary team ([facility]-Dr Viri Thorne) and Dr Vaibhav Cisneros, Oncology, on this date  Please contact us if you have any questions  Subjective:  Continues with intractable nausea and dry heaves  Is unable to keep any solid or liquids  Diarrhea improving  Review of Systems   Constitutional: Positive for appetite change and fatigue  HENT:   Positive for voice change (hoarseness)  Negative for nosebleeds and sore throat  Eyes: Negative for icterus  Respiratory: Negative for hemoptysis and shortness of breath  Cardiovascular: Negative for chest pain, leg swelling and palpitations  Gastrointestinal: Positive for diarrhea, nausea and vomiting (dry heaves)  Negative for abdominal pain  Genitourinary: Negative for hematuria  Musculoskeletal: Negative for myalgias  Skin: Negative for rash  Neurological: Negative for headaches and speech difficulty  Hematological: Negative for adenopathy  Does not bruise/bleed easily  Psychiatric/Behavioral: Negative for confusion  The patient is not nervous/anxious  All other systems reviewed and are negative  The remainder of a 12 point review of systems was negative  Objective:  /79   Pulse 90   Temp 97 7 °F (36 5 °C)   Resp 17   Ht 5' 2" (1 575 m)   Wt 44 9 kg (98 lb 15 8 oz)   SpO2 91%   BMI 18 10 kg/m²     Physical Exam   Constitutional: She is oriented to person, place, and time  She appears cachectic  She has a sickly appearance  She does not appear ill  She appears distressed  Nasal cannula in place  HENT:   Head: Normocephalic  Eyes: Conjunctivae are normal  No scleral icterus  Pulmonary/Chest: Effort normal  No respiratory distress  She has no wheezes  Abdominal: Soft  She exhibits no distension  There is guarding  Musculoskeletal: She exhibits no edema or tenderness  Lymphadenopathy:     She has no cervical adenopathy  Neurological: She is alert and oriented to person, place, and time  Skin: Skin is warm  No rash noted  Recent Labs     07/03/19  0518 07/03/19  1315 07/04/19  0603 07/05/19  0855   WBC 9 39  --   --  9 29   HGB 10 4*  --   --  10 7*     --   --  231   MCV 96  --   --  97   RDW 11 9  --   --  12 0   CREATININE 0 57* 0 63 0 53* 0 58*   AST 12  --   --   --    ALT 6*  --   --   --        Laboratory studies were reviewed    Imaging Studies:        Pathology: None    Code Status: Level 3 - DNAR and DNI    Counseling / Coordination of Care  Total floor / unit time spent today 25 minutes   Greater than 50% of total time was spent with the patient and / or family counseling and / or coordination of care

## 2019-07-05 NOTE — PLAN OF CARE
Problem: Nutrition/Hydration-ADULT  Goal: Nutrient/Hydration intake appropriate for improving, restoring or maintaining nutritional needs  Description  Monitor and assess patient's nutrition/hydration status for malnutrition (ex- brittle hair, bruises, dry skin, pale skin and conjunctiva, muscle wasting, smooth red tongue, and disorientation)  Collaborate with interdisciplinary team and initiate plan and interventions as ordered  Monitor patient's weight and dietary intake as ordered or per policy  Determine patient's food preferences and provide high-protein, high-caloric foods as appropriate  INTERVENTIONS:  - Monitor oral intake, urinary output, labs, and treatment plans  - Assess nutrition and hydration status and recommend course of action  - Evaluate amount of meals eaten  - Assist patient with eating if necessary   - Allow adequate time for meals  - Recommend/ encourage appropriate diets, oral nutritional supplements, and vitamin/mineral supplements  - Order, calculate, and assess calorie counts as needed  - Recommend, monitor, and adjust tube feedings and TPN/PPN based on assessed needs  - Assess need for intravenous fluids  - Provide nutrition/hydration education as appropriate  - Include patient/family/caregiver in decisions related to nutrition   Outcome: Not Progressing  Note:   pt continues to have inadequate intake with 50% liquid completions at best, she is not eating solids at mealtimes, she continues to vomit with any po intake, including medications  frequency of vomiting has decreased however nausea continues  Noted pt does not want to receive chemotherapy anymore  Hospice is pending  If aggressive nutrition therapies are warranted, TPN is indicated at this time  Begin standard bag TPN for at least 2-3 days d/t high risk of refeeding syndrome  Nutrition to continue to monitor closely

## 2019-07-05 NOTE — PROGRESS NOTES
Progress Note - Infectious Disease   Ruthann Anon 60 y o  female MRN: [id number]  Unit/Bed#: -01 Encounter: [id number]      Impression/Recommendations:  1  Multifocal pneumonia, involving right side   Given intractable nausea and vomiting, this is most likely aspiration pneumonia   Patient is clinically improved on IV antibiotics   Temperature is down   WBC normalized  Patient is completing antibiotic course today  Will discontinue antibiotics after today, as initially planned  However, she is at high risk for recurrent aspiration pneumonia, given ongoing nausea and vomiting  Continue IV cefepime/Flagyl  Monitor temperature/WBC  Monitor respiratory symptoms  Treat x7 days total, through today  Watch for recurrent aspiration pneumonia      2  Acute hypoxic respiratory failure, secondary to pneumonia above   Patient is clinically improved   O2 support is minimal now  Antibiotic plan as in above  O2 support per primary service      3  Intractable nausea and vomiting, most likely chemotherapy induced   Symptoms are better controlled  However, patient is unable to take anything by mouth without vomiting  Patient is ready indicating that she does not want any more chemotherapy    Symptom management per primary service      4  Moderate protein calorie malnutrition      5  Metastatic esophageal carcinoma, on XRT and chemotherapy   Patient is currently not neutropenic  Patient is already indicating this he does not want any more chemotherapy  Consider hospice      Discussed with the patient in detail regarding the above plan      Antibiotics:  Cefepime/Flagyl # 7     Subjective:  Patient's nausea persists, with intermittent vomiting  She is unable to take any pills  No abdominal pain   No diarrhea  Dyspnea mild   Cough minimal   Temperature stays down   No chills      Objective:  Vitals:  Temp:  [97 7 °F (36 5 °C)] 97 7 °F (36 5 °C)  HR:  [69-95] 90  Resp:  [17] 17  BP: (125-132)/(79-88) 125/79  SpO2: [88 %-100 %] 91 %  Temp (24hrs), Av 7 °F (36 5 °C), Min:97 7 °F (36 5 °C), Max:97 7 °F (36 5 °C)  Current: Temperature: 97 7 °F (36 5 °C)    Physical Exam:     General: Awake, alert, cooperative, no distress  Neck:  Supple  No mass  No lymphadenopathy  Lungs: Decreased breath sounds, sparse basilar rales, no wheezing, respirations unlabored  Heart:  Tachycardic with regular rhythm, S1 and S2 normal, no murmur  Abdomen: Soft, nondistended, non-tender, bowel sounds active all four quadrants,        no masses, no organomegaly  Extremities: Stable mild edema  No erythema/warmth  No ulcer  Nontender to palpation  Skin:  No rash  Neuro: Moves all extremities  Invasive Devices     Central Venous Catheter Line            Port A Cath 19 Right Chest 13 days                Labs studies:   I have personally reviewed pertinent labs  Results from last 7 days   Lab Units 19  0855 19  0603 19  1315 19  0518  19  0518  19  1917   POTASSIUM mmol/L 3 4* 4 6 3 3* 2 7*   < > 2 6*   < > 3 4*   CHLORIDE mmol/L 103 108 105 103   < > 97*   < > 98*   CO2 mmol/L 34* 29 32 34*   < > 27   < > 28   BUN mg/dL 14 17 18 16   < > 9   < > 10   CREATININE mg/dL 0 58* 0 53* 0 63 0 57*   < > 0 61   < > 0 72   EGFR ml/min/1 73sq m 99 102 96 100   < > 97   < > 90   CALCIUM mg/dL 9 2 9 9 9 3 9 1   < > 9 6   < > 10 1   AST U/L  --   --   --  12  --  27  --  14   ALT U/L  --   --   --  6*  --  8*  --  8*   ALK PHOS U/L  --   --   --  65  --  76  --  84    < > = values in this interval not displayed  Results from last 7 days   Lab Units 19  0855 19  0518 19  0513   WBC Thousand/uL 9 29 9 39 13 47*   HEMOGLOBIN g/dL 10 7* 10 4* 11 0*   PLATELETS Thousands/uL 231 220 267           Imaging Studies:   I have personally reviewed pertinent imaging study reports and images in PACS  EKG, Pathology, and Other Studies:   I have personally reviewed pertinent reports

## 2019-07-06 NOTE — RESPIRATORY THERAPY NOTE
Pt was a RRT earlier in the evening for SOB and low SpO2  Called back to patients room for repeated SOB and low SpO2  Pt was placed on a non-rebreather and NT suctioned  Pt was then transferred to the ICU and placed on a HFNC  Pt is currently on 65% and 40L  Will continue with current settings at this time

## 2019-07-06 NOTE — PLAN OF CARE
Problem: Potential for Falls  Goal: Patient will remain free of falls  Description  INTERVENTIONS:  - Assess patient frequently for physical needs  -  Identify cognitive and physical deficits and behaviors that affect risk of falls    -  Bergoo fall precautions as indicated by assessment   - Educate patient/family on patient safety including physical limitations  - Instruct patient to call for assistance with activity based on assessment  - Modify environment to reduce risk of injury  - Consider OT/PT consult to assist with strengthening/mobility  Outcome: Progressing

## 2019-07-06 NOTE — NURSING NOTE
Rapid Response 7/5/19 2050 called for oxygen desaturation to 82 % on 4lpm, shortness of breath and patient distress  Increased O2 to 6 lpm via NC, Chest X RAY ordered by provider, lasix 20 mg iv, and patient remained on the MED SURG floor   Patient saturating at 91% on 6 lpm   Orlando Hatch RN

## 2019-07-06 NOTE — PROGRESS NOTES
Transfer/Accept- Critical Care   Radha Huber 60 y o  female MRN: [id number]  Unit/Bed#:  Encounter: [id number]    Reason for Admission / Chief Complaint:  Acute hypoxic respiratory failure, aspiration pneumonia, malignant neoplasm of the esophagus, intractable nausea and vomiting    History of Present Illness:  Radha Huber is a 60 y o  female who presented to [facility] [place] on 6-28 with intractable nausea and vomiting over 24-48 hours prior to arrival   Patient is an unfortunate 70-year-old whose had metastatic esophageal cancer, seizure disorder, chronic pain, tobacco abuse  Patient's history of esophageal cancer began in July 2017 min and EGD showed midesophagus squamous cell carcinoma  She was also found have a small pulmonary nodule  Patient was started on chemo and radiation and received this from August to October of 2017  In March of 2018, patient had a local recurrence after esophagectomy  The esophagectomy was complicated by what sounds as though a leak  Surgery seemed to be successful and patient had a negative margin  On August of 2018, patient had surveillance imaging that showed mediastinum lymphadenopathy  In November of 2018, patient had restaging CT scan that showed a left pleural based mass, was post to get a PET-CT but this was denied by insurance and then she was lost to follow-up and relocated to [place]  This is when she started seeing Dr Kristopher Lennon  On 04/23/2019, patient had an EGD at [facility] and biopsy was negative for cancer  On 06/19, patient had a PET-CT scan that showed diffuse metastatic lesions including bilateral lung pleural based lesion, diffuse lymphadenopathy including mediastinal involvement  Patient was started on FOLFOX palliative chemotherapy on 06/26  Since then, patient has had recurrent, intractable nausea and vomiting    While in the hospital, she was being seen by Nephrology for ongoing hypokalemia, GI for nausea and vomiting, ID for pneumonia, and palliative care for symptom management and goals of care  She is also being seen by medical oncology  Medical Oncology recommended hospice for the patient  Unfortunately, while patient was hospitalized here, patient's  was hospitalized at [facility], so family meeting was unable to be facilitated  Overnight last night, patient had episode of increasing oxygen requirements  A rapid response was called  Please see rapid response note for details  She was given Lasix, and her oxygen requirement went back to 4 L where she had been prior to rapid response  Unfortunately, this morning, patient had another significant desaturation requiring non-rebreather  Due to ongoing hypoxia, patient was transferred to the step-down unit  History obtained from chart review and the patient  Past Medical History:  Past Medical History:   Diagnosis Date    Breast cancer ([facility] ) 2001    Esophageal cancer ([facility] )     History of chemotherapy 11/01/2001    History of radiation therapy 01/01/2002    [facility]ures [facility]     11/2014       Past Surgical History:  Past Surgical History:   Procedure Laterality Date    BREAST LUMPECTOMY Right 10/01/2001    malignant    BREAST SURGERY      COLONOSCOPY  7/2017    Clear    CT NEEDLE BIOPSY LUNG  6/11/2019    ESOPHAGECTOMY      ESOPHAGECTOMY      IR PORT PLACEMENT  6/21/2019    [place] ESOPHAGOGASTRODUODENOSCOPY TRANSORAL DIAGNOSTIC N/A 4/23/2019    Procedure: ESOPHAGOGASTRODUODENOSCOPY (EGD); Surgeon: Claire Russell MD;  Location: [facility];   Service: Gastroenterology    UPPER GASTROINTESTINAL ENDOSCOPY  7/2017       Past Family History:  Family History   Problem Relation Age of Onset    No Known Problems Mother     No Known Problems Father     No Known Problems Sister     No Known Problems Daughter     No Known Problems Maternal Grandmother     No Known Problems Maternal Grandfather     Breast cancer Paternal Grandmother 32    No Known Problems Maternal Aunt     No Known Problems Cousin     No Known Problems Cousin     No Known Problems Cousin        Social History:  Social History     Tobacco Use   Smoking Status Former Smoker    Packs/day: 0 50    Years: 50 00    Pack years: 25 00    Last attempt to quit: 2019    Years since quittin 0   Smokeless Tobacco Never Used   Tobacco Comment    pt states that she had her last cigarette 19     Social History     Substance and Sexual Activity   Alcohol Use Yes    Alcohol/week: 2 0 standard drinks    Types: 15 Standard drinks or equivalent per week    Frequency: 4 or more times a week    Drinks per session: 3 or 4    Binge frequency: Less than monthly    Comment: pt states she has not had a drink since before she went to the hospital     Social History     Substance and Sexual Activity   Drug Use Never     Marital Status: /Civil Union  Exercise History:  Unable to complete most ADLs due to weakness    Medications:  Current Facility-Administered Medications   Medication Dose Route Frequency    al mag oxide-diphenhydramine-lidocaine viscous (MAGIC MOUTHWASH) suspension 10 mL  10 mL Swish & Swallow Q6H PRN    albuterol inhalation solution 2 5 mg  2 5 mg Nebulization Q6H PRN    dexamethasone (DECADRON) injection 4 mg  4 mg Intravenous BID (AM & Afternoon)    dextrose 5 % and sodium chloride 0 45 % with KCl 40 mEq/L infusion (premix)  75 mL/hr Intravenous Continuous    enoxaparin (LOVENOX) subcutaneous injection 40 mg  40 mg Subcutaneous Daily    HYDROmorphone (DILAUDID) injection 1 mg  1 mg Intravenous Q3H    HYDROmorphone (DILAUDID) injection 1 mg  1 mg Intravenous Q2H PRN    levalbuterol (XOPENEX) inhalation solution 1 25 mg  1 25 mg Nebulization TID    levETIRAcetam (KEPPRA) 500 mg in sodium chloride 0 9 % 100 mL IVPB  500 mg Intravenous Q12H [address]    LORazepam (ATIVAN) 2 mg/mL injection 0 5 mg  0 5 mg Intravenous Q6H    LORazepam (ATIVAN) 2 mg/mL injection 0 5 mg 0 5 mg Intravenous Q4H PRN    magnesium sulfate 2 g/50 mL IVPB (premix) 2 g  2 g Intravenous Daily PRN    metoclopramide (REGLAN) injection 10 mg  10 mg Intravenous TID AC    metroNIDAZOLE (FLAGYL) IVPB (premix) 500 mg  500 mg Intravenous Q8H    OLANZapine (ZyPREXA ZYDIS) dispersible tablet 5 mg  5 mg Oral HS    [MAR Hold] ondansetron (ZOFRAN) 8 mg in sodium chloride 0 9 % 50 mL IVPB  8 mg Intravenous Q8H    pantoprazole (PROTONIX) injection 40 mg  40 mg Intravenous Q12H MAGNOLIA    promethazine (PHENERGAN) injection 25 mg  25 mg Intravenous Q6H PRN    saccharomyces boulardii (FLORASTOR) capsule 250 mg  250 mg Oral BID    senna (SENOKOT) tablet 25 8 mg  3 tablet Oral HS     Home medications:  Prior to Admission medications    Medication Sig Start Date End Date Taking?  Authorizing Provider   gabapentin (NEURONTIN) 300 mg capsule Take 600 mg by mouth 2 (two) times a day  2/11/19  Yes Historical Provider, MD   levETIRAcetam (KEPPRA) 100 mg/mL oral solution Take 5 mL (500 mg total) by mouth 2 (two) times a day 4/16/19  Yes Danielle Prakash MD   morphine (MS CONTIN) 30 mg 12 hr tablet Take 1 tablet (30 mg total) by mouth every 12 (twelve) hours for 15 daysMax Daily Amount: 60 mg 6/20/19 7/5/19 Yes Radha Bell MD   ondansetron (ZOFRAN) 4 mg tablet Take 1 tablet (4 mg total) by mouth every 8 (eight) hours as needed for nausea or vomiting 6/14/19  Yes Christina Pepper MD PhD   pantoprazole (PROTONIX) 40 mg tablet Take 1 tablet (40 mg total) by mouth daily 4/11/19  Yes Shanti Turner MD   ranitidine (ZANTAC) 150 mg tablet Take 150 mg by mouth daily at bedtime  2/19/19  Yes Historical Provider, MD   senna (SENOKOT) 8 6 mg Take 3 tablets (25 8 mg total) by mouth daily at bedtime 6/23/19  Yes Julito Shepherd MD   al mag oxide-diphenhydramine-lidocaine viscous (MAGIC MOUTHWASH) 1:1:1 suspension Swish and swallow 10 mL every 6 (six) hours as needed for mouth pain or discomfort for up to 30 days 6/23/19 7/23/19  Cuong Caceres Arlene Prince MD   nicotine (NICODERM CQ) 14 mg/24hr TD 24 hr patch Place 1 patch on the skin daily 19   Eduard Carbajal MD     Allergies:  No Known Allergies    ROS:   Review of Systems   Respiratory: Positive for shortness of breath  Gastrointestinal: Positive for nausea and vomiting  Musculoskeletal: Positive for arthralgias  All other systems reviewed and are negative  Vitals:  Vitals:    19 1920 19 2044 19 0025 19 0615   BP:  137/83 140/94 138/75   Pulse:  85 92 90   Resp:  (!) 29 18    Temp:  (!) 97 °F (36 1 °C) (!) 97 2 °F (36 2 °C)    TempSrc:       SpO2: 98% (!) 89% 91% 93%   Weight:       Height:         Temperature:   Temp (24hrs), Av 3 °F (36 3 °C), Min:97 °F (36 1 °C), Max:97 7 °F (36 5 °C)    Current Temperature: (!) 97 2 °F (36 2 °C)    Weights:   IBW: 50 1 kg  Body mass index is 18 1 kg/m²  Hemodynamic Monitoring:  N/A     Non-Invasive/Invasive Ventilation Settings:  Respiratory    Lab Data (Last 4 hours)    None         O2/Vent Data (Last 4 hours)              Non-Invasive Ventilation Mode HFNC HFNC                No results found for: PHART, YYP5MES, PO2ART, XSK7CDT, I6RCIKWS, BEART, SOURCE  SpO2: SpO2: 100 %, SpO2 Activity: SpO2 Activity: At Rest, SpO2 Device: O2 Device: Other (comment)(HFNC)     Physical Exam:  Physical Exam   Constitutional: She is oriented to person, place, and time  She appears cachectic  She has a sickly appearance  She appears distressed (Mild respiratory)  Accessory muscle use  Appears significantly older than stated age   HENT:   Head: Normocephalic and atraumatic  Mouth/Throat: Mucous membranes are dry  Eyes: Conjunctivae are normal    Neck: No JVD present  Phonation extremely hoarse   Cardiovascular: Normal rate, regular rhythm, S1 normal, S2 normal and normal pulses  Pulmonary/Chest: Accessory muscle usage present  She is in respiratory distress     Extremely diminished lung sounds with rhonchi throughout all lung fields  Abdominal: Soft  Bowel sounds are normal  She exhibits no distension  There is no tenderness  Genitourinary:   Genitourinary Comments: Deferred   Musculoskeletal: Normal range of motion  She exhibits no edema  Neurological: She is alert and oriented to person, place, and time  Skin: Skin is warm and dry  Capillary refill takes less than 2 seconds  Nursing note and vitals reviewed  Labs:  Results from last 7 days   Lab Units 07/06/19  0615 07/05/19  0855 07/03/19  0518 07/02/19  0513 07/01/19  1311 06/30/19  0518 06/29/19  0642   WBC Thousand/uL 6 22 9 29 9 39 13 47* 12 38* 7 65 16 49*   HEMOGLOBIN g/dL 11 8 10 7* 10 4* 11 0* 11 2* 12 1 11 8   HEMATOCRIT % 36 5 32 8* 31 6* 32 8* 32 8* 35 5 36 3   PLATELETS Thousands/uL 227 231 220 267 281 376 373   NEUTROS PCT %  --   --  94* 94* 95* 93*  --    BANDS PCT %  --   --   --   --   --   --  4   MONOS PCT %  --   --  2* 2* 1* 1*  --    MONO PCT %  --   --   --   --   --   --  2*     Results from last 7 days   Lab Units 07/05/19  0855 07/04/19  0603 07/03/19  1315 07/03/19  0518 07/02/19  1345 07/02/19  0513 07/01/19  1311  06/30/19  0518   SODIUM mmol/L 142 147* 145 145 140 141 141   < > 135*   POTASSIUM mmol/L 3 4* 4 6 3 3* 2 7* 2 7* 1 9* 2 0*   < > 2 6*   CHLORIDE mmol/L 103 108 105 103 100 98* 99*   < > 97*   CO2 mmol/L 34* 29 32 34* 36* 33* 31   < > 27   ANION GAP mmol/L 5 10 8 8 4 10 11   < > 11   BUN mg/dL 14 17 18 16 11 11 11   < > 9   CREATININE mg/dL 0 58* 0 53* 0 63 0 57* 0 57* 0 60 0 57*   < > 0 61   CALCIUM mg/dL 9 2 9 9 9 3 9 1 9 4 9 3 9 6   < > 9 6   ALT U/L  --   --   --  6*  --   --   --   --  8*   AST U/L  --   --   --  12  --   --   --   --  27   ALK PHOS U/L  --   --   --  65  --   --   --   --  76   ALBUMIN g/dL  --   --   --  2 0*  --   --   --   --  2 4*   TOTAL BILIRUBIN mg/dL  --   --   --  0 40  --   --   --   --  0 70    < > = values in this interval not displayed       Results from last 7 days   Lab Units 07/05/19  0855 07/04/19  0603 07/03/19  1315 06/30/19  1156 06/30/19  0518   MAGNESIUM mg/dL 1 4* 1 9 1 5*  --  1 7   PHOSPHORUS mg/dL  --   --   --  2 2*  --               Results from last 7 days   Lab Units 06/30/19  0518   LACTIC ACID mmol/L 1 5     ABG:No results found for: PHART, KVJ7UVD, PO2ART, IZI6LJD, N8YVGTLW, BEART, SOURCE  VBG:            Imaging:  I have personally reviewed pertinent films in PACS  Micro:        ______________________________________________________________________    Assessment:   Principal Problem:    Intractable nausea and vomiting  Active Problems:    Gastro-esophageal reflux disease without esophagitis    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)    Seizure disorder (HCC)    Cancer associated pain    Multifocal pneumonia    Moderate protein-calorie malnutrition (HCC)    Hypokalemia/hypernatremia    Hypomagnesemia  Resolved Problems:    Acute respiratory failure with hypoxia ([facility] )    Plan:    Neuro:   Cancer associated pain  - palliative care following  - continue Dilaudid per their recommendations    History of seizure disorder  - last seizure several years ago  - well controlled on [address] also has patient on Ativan which I would continue    Regulate sleep/wake cycle    CV:   No issues  Cardiac infusions:  None  Rhythm: NSR  Follow rhythm on telemetry    Pulm:   Acute hypoxic respiratory failure  - likely multifactorial, largely secondary to recurrent aspiration as well as lung metastasis  - patient is currently day 7 of antibiotics, was on cefepime for all 7 days, Flagyl initially, but this was discontinued but restarted time of rapid response  - unfortunately, patient's oxygen requirement continues increase, now requiring high-flow nasal cannula  - confirmed the patient is indeed a level 3 code status  - patient unlikely to improve in likely to continue to decompensate due to continued aspiration    Patient is also unable to cough due to weakness which is getting worse as patient is unable to intake any nutrition  Any time patient attempts to cough, she has emesis which she unfortunately floridly aspirates  - I called patient's  who will be in this morning for family discussion, agree with medication Oncology who was recommending hospice    Recurring aspiration with aspiration pneumonia  - continue Flagyl and cefepime for now  - infectious Disease following    Lung metastasis  - patient currently only on palliative chemotherapy  - recommend goals of care discussion  Continue Decadron    Encourage deep breathing/coughing/IS/PulmToileting   Wean O2 for sats > 92%  GI:   Esophageal cancer, metastatic with recurrence  - patient on palliative chemotherapy  - recommend goals of care discussion agree with medication Oncology recommending hospice    Intractable nausea and vomiting  - being seen by palliative care and GI  - continues on Decadron, scheduled Reglan, scheduled Zofran, and p r n  Phenergan  - continue for now for symptom management    Nutrition/diet plan:  NPO due to poor tolerance  Stress ulcer prophylaxis: No prophylaxis needed  Bowel regimen: None currently    :   No issues    Continue purewick catheter      FEN:   Hypokalemia  - secondary to ongoing losses via GI tract  - continue D5 half-normal saline with 40 of K    Fluid/Diuretic plan: No intervention  Goal 24 hour fluid balance:  Continue infusion, but would remain passively even  Electrolytes repleted: not applicable  Goal: K >4 5, Mag >2 0, and Phos >3 0    ID:   Aspiration pneumonia  - id following  Abx ordered: Cefepime and Flagyl  Day # 7 of TBD day course  Trend temps and WBC count    Heme:   No issues  Trend hgb and plts  Transfuse as needed for goal hgb >7    Endo:   Continue Accu-Cheks while NPO    Msk/Skin:  Frequent turning and offloading  Frequent turning and off-loading    Family:  Family updated within 24 hours: yes , patient's  noted 5 transfer  Family meeting planned today:  Yes, patient's  to come into hospital today     Lines:  Chest port accessed  VTE Prophylaxis:  Pharmacologic Prophylaxis: Enoxaparin (Lovenox)  Mechanical Prophylaxis: sequential compression device    Disposition: Transfer to Stepdown    Code Status: Level 3 - DNAR and DNI    Counseling / Coordination of Care  Total Critical Care time spent 42 minutes excluding procedures, teaching and family updates  ______________________________________________________________________      Invasive lines and devices: Invasive Devices     Central Venous Catheter Line            Port A Cath 06/21/19 Right Chest 14 days                Code Status: Level 3 - DNAR and DNI  POA:    POLST:      Given critical illness, patient length of stay will require greater than two midnights  Portions of the record may have been created with voice recognition software  Occasional wrong word or "sound a like" substitutions may have occurred due to the inherent limitations of voice recognition software  Read the chart carefully and recognize, using context, where substitutions have occurred        Bill Junior PA-C

## 2019-07-06 NOTE — RAPID RESPONSE
Progress Note - Rapid Response   Trae Vitale 60 y o  female MRN: [id number]    Time Called ( Time): 2051  Date Called: 7/5/19  Level of Care: MS  Room#: 777  XYOBDXT Time ([facility] Time): 2052  Event End Time ( Time): 2112  Primary reason for call: Acute change in SPO2  Interventions:  Airway/Breathing:  O2 Mask/Nasal  Circulation: N/A  Other Treatments: CXR       Assessment:   1  Acute hypoxic respiratory failure  2  Pneumonia (likely aspiration)  3  Recurrent esophageal Ca    Plan:   · Patient with improvement in saturation after up titration of oxygen  · Chest x-ray ordered, images reviewed, improved aeration from previous film  · When I asked patient to cough, patient had large volume emesis, and then had very wet sounding secretions suspicious for ongoing aspiration  · Patient also did exhibit mild crackles in the bases of lungs  · Will give 20 of IV Lasix now, will also give 20 mEq of potassium secondary to patient is hypokalemic state  · Palliative care is on board, would recommend family meeting as soon as possible as it appears that patient continues to have chronic aspiration and that is likely causing further hypoxia  · I also reviewed id is note, wanted patient on Flagyl, this has been discontinued since Monday, will reorder  I discussed plan of care with internal medicine service  HPI/Chief Complaint (Background/Situation):   Trae Vitale is a 60y o  year old female who presented on 6/28 with intractable nausea/vomiting with a PMH of esophageal ca with distant history of esophagectomy now with local recurrence on chemo  Patient is hospital day 6  She was bring treated for hypoxic respiratory failure due to pneumonia, likely aspiration in origin  She had been fairly consistently on 4L NC  Patient had an abrupt drop in O2 sat to 82% and required up-titration of O2 to 6 L NC and RRT was called      Historical Information   Past Medical History:   Diagnosis Date    [facility] cancer [facility])     [facility]     History of chemotherapy 2001    History of radiation therapy 2002    Seizures ([facility] [address] )     2014     Past Surgical History:   Procedure Laterality Date    BREAST LUMPECTOMY Right 10/01/2001    malignant    BREAST SURGERY      COLONOSCOPY  2017    Clear    CT NEEDLE BIOPSY LUNG  2019    ESOPHAGECTOMY      ESOPHAGECTOMY      IR PORT PLACEMENT  2019    NH ESOPHAGOGASTRODUODENOSCOPY TRANSORAL DIAGNOSTIC N/A 2019    Procedure: ESOPHAGOGASTRODUODENOSCOPY (EGD); Surgeon: Willis Hsieh MD;  Location: [facility];   Service: Gastroenterology    UPPER GASTROINTESTINAL ENDOSCOPY  2017     Social History   Social History     Substance and Sexual Activity   Alcohol Use Yes    Alcohol/week: 2 0 standard drinks    Types: 15 Standard drinks or equivalent per week    Frequency: 4 or more times a week    Drinks per session: 3 or 4    Binge frequency: Less than monthly    Comment: pt states she has not had a drink since before she went to the hospital     Social History     Substance and Sexual Activity   Drug Use Never     Social History     Tobacco Use   Smoking Status Former Smoker    Packs/day: 0 50    Years: 50 00    Pack years: 25 00    Last attempt to quit: 2019    Years since quittin 0   Smokeless Tobacco Never Used   Tobacco Comment    pt states that she had her last cigarette 19     Family History: non-contributory    Meds/Allergies     Current Facility-Administered Medications:  al mag oxide-diphenhydramine-lidocaine viscous 10 mL Swish & Swallow Q6H PRN Favian Foote MD    albuterol 2 5 mg Nebulization Q6H PRN Kimani Prado MD    dexamethasone 4 mg Intravenous BID (AM & Afternoon) Von Lange MD    dextrose 5 % and sodium chloride 0 45 % with KCl 40 mEq/L 75 mL/hr Intravenous Continuous Dana Nava MD Last Rate: 75 mL/hr (19 1300)   enoxaparin 40 mg Subcutaneous Daily Merary Avila MD HYDROmorphone 1 mg Intravenous Q3H Sal Mariano MD    HYDROmorphone 1 mg Intravenous Q2H PRN Sal Mariano MD    levalbuterol 1 25 mg Nebulization TID Carly Payne MD    levETIRAcetam 500 mg Intravenous Q12H [address] Carly Payne MD Last Rate: 500 mg (07/05/19 2130)   LORazepam 0 5 mg Intravenous Q6H Orly Joseph MD    LORazepam 0 5 mg Intravenous Q4H PRN Sal Mariano MD    magnesium sulfate 2 g Intravenous Daily PRN Akiko Erickson MD    metoclopramide 10 mg Intravenous TID [facility] Sal Mariano MD    metroNIDAZOLE 500 mg Intravenous Q8H Misty Edward PA-C Last Rate: 500 mg (07/06/19 0247)   OLANZapine 5 mg Oral HS Sal Mariano MD    ondansetron 8 mg Intravenous Q8H Akiko Erickson MD Last Rate: 8 mg (07/06/19 0251)   pantoprazole 40 mg Intravenous Q12H [address] Skye Coppola PA-C    promethazine 25 mg Intravenous Q6H PRN Orly Joseph MD    saccharomyces boulardii 250 mg Oral BID Favian Foote MD    senna 3 tablet Oral HS Favian Foote MD          dextrose 5 % and sodium chloride 0 45 % with KCl 40 mEq/L 75 mL/hr Last Rate: 75 mL/hr (07/05/19 1300)       No Known Allergies    ROS: Negative except shortness of breath/nausea    Vitals:   Vitals:    07/05/19 1920 07/05/19 2044 07/06/19 0025 07/06/19 0615   BP:  137/83 140/94 138/75   Pulse:  85 92 90   Resp:  (!) 29 18    Temp:  (!) 97 °F (36 1 °C) (!) 97 2 °F (36 2 °C)    TempSrc:       SpO2: 98% (!) 89% 91% 93%   Weight:       Height:           Physical Exam:  Gen: Extremely cachectic, female appearing older than stated age in mild respiratory distress  HEENT: NCAT  Conjunctiva clear, MMD    Neck: Supple  No JVD  Chest: Right chest wall port accessed  Extremely diminished breath sounds  Cor: RRR without M/R/G  Abd: Soft, NTND  Ext: No deformities   No Edema  Neuro: Awake, alert, oriented X3  Skin: Warm and dry      Intake/Output Summary (Last 24 hours) at 7/6/2019 0432  Last data filed at 7/5/2019 2251  Gross per 24 hour Intake 400 ml   Output 600 ml   Net -200 ml       Respiratory    Lab Data (Last 4 hours)    None         O2/Vent Data (Last 4 hours)    None              Invasive Devices     Central Venous Catheter Line            Port A Cath 06/21/19 Right Chest 14 days                DIAGNOSTIC DATA:    Lab: I have personally reviewed pertinent lab results  CBC:   Results from last 7 days   Lab Units 07/05/19  0855   WBC Thousand/uL 9 29   HEMOGLOBIN g/dL 10 7*   HEMATOCRIT % 32 8*   PLATELETS Thousands/uL 231     CMP:   Results from last 7 days   Lab Units 07/05/19  0855 07/04/19  0603 07/03/19  1315 07/03/19  0518  06/30/19  0518   POTASSIUM mmol/L 3 4* 4 6 3 3* 2 7*   < > 2 6*   CHLORIDE mmol/L 103 108 105 103   < > 97*   CO2 mmol/L 34* 29 32 34*   < > 27   BUN mg/dL 14 17 18 16   < > 9   CREATININE mg/dL 0 58* 0 53* 0 63 0 57*   < > 0 61   CALCIUM mg/dL 9 2 9 9 9 3 9 1   < > 9 6   ALK PHOS U/L  --   --   --  65  --  76   ALT U/L  --   --   --  6*  --  8*   AST U/L  --   --   --  12  --  27    < > = values in this interval not displayed  PT/INR:   No results found for: PT, INR,   Magnesium: No components found for: MAG,   Phosphorous: No results found for: PHOS    Microbiology:  Lab Results   Component Value Date    BLOODCX No Growth After 5 Days  06/22/2019    BLOODCX No Growth After 5 Days  06/22/2019         OUTCOME:   Stayed in room   Family notified of transfer: yes  Family member contacted: Patient's  called by SLIM  Code Status: Level 3 - DNAR and DNI  Critical Care Time: Total Critical Care time spent 25 minutes excluding procedures, teaching and family updates

## 2019-07-07 NOTE — PROGRESS NOTES
Assisting pt with mouth care and medication pass  Pt AAOX3  Pt removed HFNC  Assisted with repositioning cannula, but patient stated she wanted it removed and did not want to wear it anymore  Explained that if [address] removed she would not be able to oxygenate appropriately to support life  Pt verbalized understanding and nodded head  Again asked if she still wanted the HFNC removed and she said yes  Notified Lawyer Bernal NP to come speak with the patient and form a plan of care going forward  After pt spoke with the NP again, confirmed that she wanted the HFNC removed and did not want and other oxygen therapy  Resipratory therapy called to removed HFNC machine from room  Will take measures to ensure patient is comfortable

## 2019-07-07 NOTE — PLAN OF CARE
Problem: Potential for Falls  Goal: Patient will remain free of falls  Description  INTERVENTIONS:  - Assess patient frequently for physical needs  -  Identify cognitive and physical deficits and behaviors that affect risk of falls    -  [place] fall precautions as indicated by assessment   - Educate patient/family on patient safety including physical limitations  - Instruct patient to call for assistance with activity based on assessment  - Modify environment to reduce risk of injury  - Consider OT/PT consult to assist with strengthening/mobility  Outcome: Progressing     Problem: PAIN - ADULT  Goal: Verbalizes/displays adequate comfort level or baseline comfort level  Description  Interventions:  - Encourage patient to monitor pain and request assistance  - Assess pain using appropriate pain scale  - Administer analgesics based on type and severity of pain and evaluate response  - Implement non-pharmacological measures as appropriate and evaluate response  - Consider cultural and social influences on pain and pain management  - Notify physician/advanced practitioner if interventions unsuccessful or patient reports new pain  Outcome: Progressing     Problem: INFECTION - ADULT  Goal: Absence or prevention of progression during hospitalization  Description  INTERVENTIONS:  - Assess and monitor for signs and symptoms of infection  - Monitor lab/diagnostic results  - Monitor all insertion sites, i e  indwelling lines, tubes, and drains  - Monitor endotracheal (as able) and nasal secretions for changes in amount and color  - [place] appropriate cooling/warming therapies per order  - Administer medications as ordered  - Instruct and encourage patient and family to use good hand hygiene technique  - Identify and instruct in appropriate isolation precautions for identified infection/condition  Outcome: Progressing     Problem: SAFETY ADULT  Goal: Patient will remain free of falls  Description  INTERVENTIONS:  - Assess patient frequently for physical needs  -  Identify cognitive and physical deficits and behaviors that affect risk of falls  -  [place] fall precautions as indicated by assessment   - Educate patient/family on patient safety including physical limitations  - Instruct patient to call for assistance with activity based on assessment  - Modify environment to reduce risk of injury  - Consider OT/PT consult to assist with strengthening/mobility  Outcome: Progressing     Problem: DISCHARGE PLANNING  Goal: Discharge to home or other facility with appropriate resources  Description  INTERVENTIONS:  - Identify barriers to discharge w/patient and caregiver  - Arrange for needed discharge resources and transportation as appropriate  - Identify discharge learning needs (meds, wound care, etc )  - Arrange for interpretive services to assist at discharge as needed  - Refer to Case Management Department for coordinating discharge planning if the patient needs post-hospital services based on physician/advanced practitioner order or complex needs related to functional status, cognitive ability, or social support system  Outcome: Progressing     Problem: Knowledge Deficit  Goal: Patient/family/caregiver demonstrates understanding of disease process, treatment plan, medications, and discharge instructions  Description  Complete learning assessment and assess knowledge base    Interventions:  - Provide teaching at level of understanding  - Provide teaching via preferred learning methods  Outcome: Progressing     Problem: RESPIRATORY - ADULT  Goal: Achieves optimal ventilation and oxygenation  Description  INTERVENTIONS:  - Assess for changes in respiratory status  - Assess for changes in mentation and behavior  - Position to facilitate oxygenation and minimize respiratory effort  - Oxygen administration by appropriate delivery method based on oxygen saturation (per order) or ABGs  - Initiate smoking cessation education as indicated  - Encourage broncho-pulmonary hygiene including cough, deep breathe, Incentive Spirometry  - Assess the need for suctioning and aspirate as needed  - Assess and instruct to report SOB or any respiratory difficulty  - Respiratory Therapy support as indicated  Outcome: Progressing     Problem: Nutrition/Hydration-ADULT  Goal: Nutrient/Hydration intake appropriate for improving, restoring or maintaining nutritional needs  Description  Monitor and assess patient's nutrition/hydration status for malnutrition (ex- brittle hair, bruises, dry skin, pale skin and conjunctiva, muscle wasting, smooth red tongue, and disorientation)  Collaborate with interdisciplinary team and initiate plan and interventions as ordered  Monitor patient's weight and dietary intake as ordered or per policy  Determine patient's food preferences and provide high-protein, high-caloric foods as appropriate       INTERVENTIONS:  - Monitor oral intake, urinary output, labs, and treatment plans  - Assess nutrition and hydration status and recommend course of action  - Evaluate amount of meals eaten  - Assist patient with eating if necessary   - Allow adequate time for meals  - Recommend/ encourage appropriate diets, oral nutritional supplements, and vitamin/mineral supplements  - Order, calculate, and assess calorie counts as needed  - Recommend, monitor, and adjust tube feedings and TPN/PPN based on assessed needs  - Assess need for intravenous fluids  - Provide nutrition/hydration education as appropriate  - Include patient/family/caregiver in decisions related to nutrition   Outcome: Progressing     Problem: Prexisting or High Potential for Compromised Skin Integrity  Goal: Skin integrity is maintained or improved  Description  INTERVENTIONS:  - Identify patients at risk for skin breakdown  - Assess and monitor skin integrity  - Assess and monitor nutrition and hydration status  - Monitor labs (i e  albumin)  - Assess for incontinence   - Turn and reposition patient  - Assist with mobility/ambulation  - Relieve pressure over bony prominences  - Avoid friction and shearing  - Provide appropriate hygiene as needed including keeping skin clean and dry  - Evaluate need for skin moisturizer/barrier cream  - Collaborate with interdisciplinary team (i e  Nutrition, Rehabilitation, etc )   - Patient/family teaching  Outcome: Progressing

## 2019-07-07 NOTE — QUICK NOTE
I came to the patient's bedside today for routine follow-up evaluation  I was informed that the patient has now been placed on comfort measures  We will therefore sign off at this time

## 2019-07-07 NOTE — PROGRESS NOTES
Progress Note - Critical Care   Maite Parra 60 y o  female MRN: [id number]  Unit/Bed#:  Encounter: [id number]    Attending Physician: Chloe Gonzáles, DO      ______________________________________________________________________  Assessment and Plan:   Principal Problem:    Intractable nausea and vomiting  Active Problems:    Gastro-esophageal reflux disease without esophagitis    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)    Seizure disorder (HCC)    Cancer associated pain    Multifocal pneumonia    Moderate protein-calorie malnutrition (HCC)    Hypokalemia/hypernatremia    Hypomagnesemia  Resolved Problems:    Acute respiratory failure with hypoxia ([address] )        Neuro:  Encourage good sleep hygiene  Monitor for delirium  Symptom management for pain and air hunger  CV:  Hemodynamically stable overnight  Pulm:  Patient on room air at her request   Offers of different oxygen delivery declined  Morphine for air hunger as needed  GI:  Continue current regimen for nausea control, including Ativan and Zofran  :  No current acute issues identified    F/E/N:  NPO at present    ID:  Consider discontinuing antibiotics as futile care in the absence of active correction of her hypoxemia    Heme:  Hemoglobin and platelet count stable  Endo:  Glucoses stable  Msk/Skin:  Position of comfort  Meticulous skin care  Disposition:  Med Surg level of care, possible hospice disposition  Code Status: Level 4 - Comfort Care    Counseling / Coordination of Care  Total time spent today 45 minutes  Greater than 50% of total time was spent with the patient and / or family counseling and / or coordination of care  A description of the counseling / coordination of care:    ______________________________________________________________________    24 Hour Events:   Goals of care discussion held yesterday    Patient requested comfort measures only late in the evening, stating she no longer wanted oxygen supplementation of any type  Goal shifting to comfort  Patient with saturations in the 60s to 70s overnight on room air, with Ativan and morphine providing adequate symptom relief  Patient's  at bedside  ______________________________________________________________________    Physical Exam:   GEN:  Cachectic, appears older than stated age, no mild distress  HEENT:  Sclera anicteric, mucous membranes pale and dry, conjunctiva pale, no blossom/rhinorrhea  Neck:  normal ROM, supple, no bruits, no tracheal deviation  CV :  S1S2, regular, no murmurs, rubs or gallops  Intact distal pulses  No JVD  Resp:  Lungs rhonchorous throughout, diminished  No subq air or crepitus  Symmetrical expansion  No cough noted  GI :  Abd soft, nontender, no guarding/rebound, nondistended, absent bowel sounds X4 quads, no organomegaly appreciated  Neuro:  CN II-XII grossly intact, nonfocal exam, speech clear, GCS 14-I opens to voice  Psych:  Appropriate affect  ______________________________________________________________________  Blood pressure 102/62, pulse 76, temperature 97 7 °F (36 5 °C), temperature source Oral, resp  rate 16, height 5' 2" (1 575 m), weight 44 9 kg (98 lb 15 8 oz), SpO2 (!) 76 %  Temperature:   Temp (24hrs), Av 6 °F (36 4 °C), Min:97 5 °F (36 4 °C), Max:97 7 °F (36 5 °C)    Current Temperature: 97 7 °F (36 5 °C)  Weights:   IBW: 50 1 kg    Body mass index is 18 1 kg/m²  Weight (last 2 days)     None          Non-Invasive/Invasive Ventilation Settings:  Respiratory    Lab Data (Last 4 hours)    None         O2/Vent Data (Last 4 hours)       2344          Non-Invasive Ventilation Mode HFNC                 No results found for: PHART, ZQE4OQD, PO2ART, RTX2KBQ, F1NTZPXY, BEART, SOURCE  SpO2: SpO2: (!) 76 %    Intake and Outputs:  I/O       701 -  07 -  07    P  O  400     I V  (mL/kg)  1323 8 (29 5)    IV Piggyback  800    Total Intake(mL/kg) 400 (8 9) 2123 8 (47 3)    Urine (mL/kg/hr) 600 (0 6) 1005 (0 9)    Emesis/NG output  0    Total Output 600 1005    Net -200 +1118 8          Unmeasured Urine Occurrence 0 x     Unmeasured Stool Occurrence 0 x     Unmeasured Emesis Occurrence  4 x          Nutrition:        Diet Orders   (From admission, onward)            Start     Ordered    07/06/19 1025  Diet NPO  Diet effective now     Comments:  Please provide ensure with every meal   Question Answer Comment   Diet Type NPO    RD to adjust diet per protocol? Yes        07/06/19 1024          Labs:   Pending      Imaging:  None today  EKG:  Sinus rhythm on cardiac monitor  Micro:  Lab Results   Component Value Date    BLOODCX No Growth After 5 Days  06/22/2019    BLOODCX No Growth After 5 Days   06/22/2019     Allergies: No Known Allergies  Medications:   Scheduled Meds:  Current Facility-Administered Medications:  al mag oxide-diphenhydramine-lidocaine viscous 10 mL Swish & Swallow Q6H PRN Jerrlyn Eye, PA-C    albuterol 2 5 mg Nebulization Q6H PRN Jerrlyn Eye, PA-C    dexamethasone 4 mg Intravenous BID (AM & Afternoon) Jerrlyn Eye, PA-C    dextrose 5 % and sodium chloride 0 45 % with KCl 40 mEq/L 75 mL/hr Intravenous Continuous Jerrlyn Eye, PA-C Last Rate: 75 mL/hr (07/06/19 1823)   enoxaparin 40 mg Subcutaneous Daily Jerrlyn Eye, PA-C    HYDROmorphone 1 mg Intravenous Q3H Jerrlyn Eye, PA-C    HYDROmorphone 1 mg Intravenous Q2H PRN Jerrlyn Eye, PA-C    levalbuterol 1 25 mg Nebulization TID Jerrlyn Eye, PA-C    levETIRAcetam 500 mg Intravenous Q12H [address] Jerrlyn Eye, PA-C Last Rate: Stopped (07/06/19 2125)   LORazepam 0 5 mg Intravenous Q6H Jerrlyn Eye, PA-C    LORazepam 0 5 mg Intravenous Q4H PRN Jerrlyn Eye, PA-C    magnesium sulfate 2 g Intravenous Daily PRN Jerrlyn Eye, PA-C    metroNIDAZOLE 500 mg Intravenous Q8H Jerrlyn Eye, PA-C Last Rate: 500 mg (07/06/19 1819)   morphine injection 2 mg Intravenous Q1H PRN BRAIN Zhao    OLANZapine 5 mg Oral HS Sweta Mano, PA-C    ondansetron 8 mg Intravenous Q8H Sweta Mano, PA-C Last Rate: Stopped (07/06/19 2330)   pantoprazole 40 mg Intravenous Q12H [address] Sweta Mano, PA-C    promethazine 25 mg Intravenous Q6H PRN Sweta Mano, PA-C    saccharomyces boulardii 250 mg Oral BID Sweta Mano, PA-C    scopolamine 1 patch Transdermal Q72H Sweta Mano, PA-C    senna 3 tablet Oral HS Sweta Mano, PA-C      Continuous Infusions:  dextrose 5 % and sodium chloride 0 45 % with KCl 40 mEq/L 75 mL/hr Last Rate: 75 mL/hr (07/06/19 1823)     PRN Meds:    al mag oxide-diphenhydramine-lidocaine viscous 10 mL Q6H PRN   albuterol 2 5 mg Q6H PRN   HYDROmorphone 1 mg Q2H PRN   LORazepam 0 5 mg Q4H PRN   magnesium sulfate 2 g Daily PRN   morphine injection 2 mg Q1H PRN   promethazine 25 mg Q6H PRN     VTE Pharmacologic Prophylaxis: Enoxaparin (Lovenox)  VTE Mechanical Prophylaxis: sequential compression device  Invasive lines and devices: Invasive Devices     Central Venous Catheter Line            Port A Cath 06/21/19 Right Chest 15 days          Drain            Urethral Catheter Temperature probe 16 Fr  less than 1 day                     Portions of the record may have been created with voice recognition software  Occasional wrong word or "sound a like" substitutions may have occurred due to the inherent limitations of voice recognition software  Read the chart carefully and recognize, using context, where substitutions have occurred      BRAIN Eugene

## 2019-07-07 NOTE — QUICK NOTE
Critical Care Medicine:  Significant Event  Elba Enciso  /  07/06/19   2310  Called to patient's bedside at her request to discuss discontinuation of her oxygen  Patient states she no longer wants the high-flow nasal cannula, stating that she is tired  She is awake alert oriented x3  She understands that by removing the oxygen, she is likely to suffer profound hypoxemia and death as a result  Patient acknowledges conversation that she had with Dr Donna Adams earlier today, originally having wanted to see how she could do through the weekend  She advises me that she has changed her mind and wishes to be made comfortable at this time  I asked her if she wishes me to contact anyone on her behalf, to which she replied she would like me to let her  know what her decision is  Offered other modalities of oxygenation, to which she declined  Patient states she wishes to be made comfortable at this time, including medications for air hunger should that become necessary, although it may have hasten the moment of her death  I phone Amish Lerner to advise him of his wife's decision  He states I am the executor of her will and I want the oxygen left on   I advised him that she is of sound mind and has made a treatment decision, and that this was not informative phone call  I invite him to the hospital, to which he replies I want her to wear the oxygen until I come tomorrow at 11 o'clock with her daughter    I advised him I can make this request of the patient, however if she declines, he remains her decision  I encouraged him to come to the hospital, as discontinuation of the oxygen is expected to have deleterious effects and possibly hasten the moment of her death  Nasal cannula discontinued at the bedside at the patient's request   Bedside nursing updated  Code status changed appropriately      BRAIN Nichole

## 2019-07-07 NOTE — RESPIRATORY THERAPY NOTE
07/06/19 2344   Non-Invasive Information   Interface HFNC prongs   Non-Invasive Ventilation Mode HFNC   SpO2 (!) 76 %   $ Pulse Oximetry Spot Check Charge Completed   Resp Comments Pt taken off HFNC at this time for comfort care

## 2019-07-08 NOTE — PROGRESS NOTES
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)    Seizure disorder (HCC)    Cancer associated pain    Multifocal pneumonia    Moderate protein-calorie malnutrition (HCC)    Hypokalemia/hypernatremia    Hypomagnesemia  Resolved Problems:    Acute respiratory failure with hypoxia ([address] )        Patient placed on comfort measures early yesterday  Saturations continue in the 76s  Continuous Dilaudid for pain control, p r n  Morphine for respiratory distress  Continuing steroids and Keppra for palliative reasons  Zofran for nausea  May be beneficial to obtain a hospice consultation today  Disposition:  Continue Med Surg level of care, discuss hospice with family today  Code Status: Level 4 - Comfort Care    Counseling / Coordination of Care  Total time spent today 30 minutes  Greater than 50% of total time was spent with the patient and / or family counseling and / or coordination of care  A description of the counseling / coordination of care:    ______________________________________________________________________    24 Hour Events:   Patient placed on comfort measures at her request   May benefit from hospice consultation today as she has plateaued  ______________________________________________________________________    Physical Exam:   GEN:  Appears comfortable, ill  HEENT:  Sclera anicteric, mucous membranes pale dry, conjunctiva pale, no blossom/rhinorrhea  Neck:  supple, no bruits  CV :  S1S2, regular no murmurs, rubs or gallops  Intact distal pulses  No JVD  Resp:  Lungs decreased throughout    No subq air or crepitus  Symmetrical expansion  No cough noted  GI :  Abd soft, nontender, no guarding/rebound, nondistended, absent bowel sounds X4 quads, no organomegaly appreciated  Neuro:   Lethargic, minimally interactive        ______________________________________________________________________  Blood pressure 102/62, pulse 82, temperature 97 7 °F (36 5 °C), temperature source Oral, resp  rate (!) 8, height 5' 2" (1 575 m), weight 44 9 kg (98 lb 15 8 oz), SpO2 (!) 79 %  Temperature:   No data recorded  Current Temperature: 97 7 °F (36 5 °C)  Weights:   IBW: 50 1 kg    Body mass index is 18 1 kg/m²  Weight (last 2 days)     None          Non-Invasive/Invasive Ventilation Settings:  Respiratory    Lab Data (Last 4 hours)    None         O2/Vent Data (Last 4 hours)    None              No results found for: PHART, GBP7FCL, PO2ART, IHN3QFR, B6YYIWEV, BEART, SOURCE  SpO2: SpO2: (!) 79 %    Intake and Outputs:  I/O        07 -  07 -  0700    I V  (mL/kg) 1848 8 (41 2) 301 7 (6 7)    IV Piggyback 800 300    Total Intake(mL/kg) 2648 8 (59) 601 7 (13 4)    Urine (mL/kg/hr) 1520 (1 4) 925 (0 9)    Emesis/NG output 0     Total Output 1520 925    Net +1128 8 -323 4          Unmeasured Emesis Occurrence 4 x           Nutrition:        Diet Orders   (From admission, onward)            Start     Ordered    19 1025  Diet NPO  Diet effective now     Comments:  Please provide ensure with every meal   Question Answer Comment   Diet Type NPO    RD to adjust diet per protocol? Yes        19 1024          Labs:   None today      Imaging:  None today  EKG:  Not applicable  Micro:  Lab Results   Component Value Date    BLOODCX No Growth After 5 Days  2019    BLOODCX No Growth After 5 Days   2019     Allergies: No Known Allergies  Medications:   Scheduled Meds:  Current Facility-Administered Medications:  dexamethasone 4 mg Intravenous BID (AM & Afternoon) Alayna Lewis PA-C glycopyrrolate 0 1 mg Intravenous Q1H PRN Chen Del Rosario, PA-C    haloperidol lactate 2 mg Intravenous Q4H PRN Chen Del Rosario, PA-C    HYDROmorphone 0 5 mg/hr Intravenous Continuous JAVID Vazquez-C Last Rate: 0 5 mg/hr (07/07/19 1542)   levETIRAcetam 500 mg Intravenous Q12H [facility] JAVID Vazquez-LAWRENCE Last Rate: 500 mg (07/07/19 2116)   LORazepam 0 5 mg Intravenous Q2H PRN Chen Del Rosario, PA-C    morphine injection 2 mg Intravenous Q30 Min PRN Chen Del Rosario, PA-C    OLANZapine 5 mg Oral HS JAVID Vazquez-C    ondansetron 8 mg Intravenous Q8H Chen Del Rosario, PA-C Last Rate: 8 mg (07/08/19 0025)   promethazine 25 mg Intravenous Q6H PRN JAVID Vazquez-C    scopolamine 1 patch Transdermal Q72H JAVID Vazquez-C      Continuous Infusions:  HYDROmorphone 0 5 mg/hr Last Rate: 0 5 mg/hr (07/07/19 1542)     PRN Meds:    glycopyrrolate 0 1 mg Q1H PRN   haloperidol lactate 2 mg Q4H PRN   LORazepam 0 5 mg Q2H PRN   morphine injection 2 mg Q30 Min PRN   promethazine 25 mg Q6H PRN     VTE Pharmacologic Prophylaxis: On comfort measures  VTE Mechanical Prophylaxis: reason for no mechanical VTE prophylaxis On comfort measures  Invasive lines and devices: Invasive Devices     Central Venous Catheter Line            Port A Cath 06/21/19 Right Chest 16 days          Drain            Urethral Catheter Temperature probe 16 Fr  1 day                     Portions of the record may have been created with voice recognition software  Occasional wrong word or "sound a like" substitutions may have occurred due to the inherent limitations of voice recognition software  Read the chart carefully and recognize, using context, where substitutions have occurred      BRAIN aPtrick

## 2019-07-08 NOTE — TELEPHONE ENCOUNTER
TIME OUT performed with Brittni HUNTER at [facility]  Patient currently comfort care in the hospital  Will cancel treatments and schedule

## 2019-07-08 NOTE — PROGRESS NOTES
Progress note - Palliative and Supportive Care   Eileen Epstein 60 y o  female [id number]    Assessment:     Patient Active Problem List   Diagnosis    Gastro-esophageal reflux disease without esophagitis    History of breast cancer    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 [facility] )    Seizure disorder ([facility] )    Cancer associated pain    Cellulitis of chest wall    Therapeutic opioid-induced constipation (OIC)    Dehydration    Multifocal pneumonia    Moderate protein-calorie malnutrition (HCC)    Hypokalemia/hypernatremia    Intractable nausea and vomiting    Hypomagnesemia         Plan:  1  Symptom management -    -  Continue dilaudid 0 5mg/hr       -  D/c scopolamine patch in favor of robinul 200mcg q6h   - Continue scheduled decadron     - Hadlol 2mg q4h PRN   - Ativan 0 5mg q2h PRN   - Morphine 2mg for breakhtrough   -  Phenergan 25mg q6h PRN for additional nausea control  2  Goals - Comfort care in the hospital  Patient's current oxygenation status and need for aggressive symptom mgmt would preclude transportation at this time to an inpatient hospice unit  I recommend continued comfort-care status in hospital       Code Status: level 4       Interval history:       Reviewed documentation from the weekend  Patient had worsening respiratory status and after goals discussions with the ICU team elected comfort cares  Patient placed on IV hydromorphone infusion and requested O2 be removed  Currently patient resting comfortably in the her bed  She slightly opens her eyes to my exam but appears calm and relaxed         MEDICATIONS / ALLERGIES:     all current active meds have been reviewed and current meds:   Current Facility-Administered Medications   Medication Dose Route Frequency    dexamethasone (DECADRON) injection 4 mg  4 mg Intravenous BID (AM & Afternoon)    glycopyrrolate (ROBINUL) injection 0 1 mg  0 1 mg Intravenous Q1H PRN    haloperidol lactate (HALDOL) injection 2 mg  2 mg Intravenous Q4H PRN    HYDROmorphone (DILAUDID) 50 mg in sodium chloride 0 9% 50mL drip  0 5 mg/hr Intravenous Continuous    levETIRAcetam (KEPPRA) 500 mg in sodium chloride 0 9 % 100 mL IVPB  500 mg Intravenous Q12H [address]    LORazepam (ATIVAN) 2 mg/mL injection 0 5 mg  0 5 mg Intravenous Q2H PRN    morphine injection 2 mg  2 mg Intravenous Q30 Min PRN    ondansetron (ZOFRAN) 8 mg in sodium chloride 0 9 % 50 mL IVPB  8 mg Intravenous Q8H    promethazine (PHENERGAN) injection 25 mg  25 mg Intravenous Q6H PRN    scopolamine (TRANSDERM-SCOP) 1 5 mg/3 days TD 72 hr patch 1 patch  1 patch Transdermal Q72H       No Known Allergies    OBJECTIVE:    Physical Exam  Physical Exam   Constitutional: No distress  HENT:   Head: Atraumatic  Brow smooth and relaxed   Eyes: Right eye exhibits no discharge  Left eye exhibits no discharge  Pulmonary/Chest: Effort normal  No respiratory distress  Abdominal: Soft  She exhibits no distension  Musculoskeletal: She exhibits no edema  Neurological:   Briefly opens eyes to exam   Skin: Skin is warm and dry  She is not diaphoretic  No pallor  Psychiatric:   Unable to assess   Nursing note and vitals reviewed  Lab Results: I have personally reviewed pertinent labs  , CBC: No results found for: WBC, HGB, HCT, MCV, PLT, ADJUSTEDWBC, MCH, MCHC, RDW, MPV, NRBC, CMP: No results found for: SODIUM, K, CL, CO2, ANIONGAP, BUN, CREATININE, GLUCOSE, CALCIUM, AST, ALT, ALKPHOS, PROT, BILITOT, EGFR      Counseling / Coordination of Care  Total floor / unit time spent today 20+ minutes  Greater than 50% of total time was spent with the patient and / or family counseling and / or coordination of care  A description of the counseling / coordination of care: patient assessment, chart review, comfort meds reviewed and changed

## 2019-07-08 NOTE — PLAN OF CARE
Problem: DISCHARGE PLANNING - CARE MANAGEMENT  Goal: Discharge to post-acute care or home with appropriate resources  Description  INTERVENTIONS:  - Conduct assessment to determine patient/family and health care team treatment goals, and need for post-acute services based on payer coverage, community resources, and patient preferences, and barriers to discharge  - Address psychosocial, clinical, and financial barriers to discharge as identified in assessment in conjunction with the patient/family and health care team  - Arrange appropriate level of post-acute services according to patients   needs and preference and payer coverage in collaboration with the physician and health care team  - Communicate with and update the patient/family, physician, and health care team regarding progress on the discharge plan  - Arrange appropriate transportation to post-acute venues  Outcome: Progressing  Note:   Pt is on comfort care  She is too unstable to transport

## 2019-07-08 NOTE — PROGRESS NOTES
Time Out:       spoke with Gonsalo Crowell  Pt currently inpt in hospital  Per office remaining infusion appts to be cancelled  Spoke with Jyoti Lassiter in pharmacy and made aware

## 2019-07-09 NOTE — DISCHARGE SUMMARY
Discharge Summary - Yanira Patel 60 y o  female MRN: [id number]    Unit/Bed#:  Encounter: [id number] PCP: Merlin Epperson MD    Admission Date:   Admission Orders (From admission, onward)    Ordered        06/29/19 1124  Inpatient Admission  Once         06/28/19 2337  Place in Observation  Once               Admitting Diagnosis: 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 ([facility] [address] ) [C15 4]  Colitis [K52 9]  Vomiting [R11 10]  Esophageal cancer ([facility] [address] ) [C15 9]  Abdominal pain [R10 9]  Moderate protein-calorie malnutrition ([facility] [address] ) [E44 0]  Intractable nausea and vomiting [R11 2]    HPI:   Yanira Patel was 60year old woman who presented to [facility]/28/19 for evaluation of intractable nausea and vomiting  Patient has a significant past medical history of metastatic esophageal CA, malignant pleural effusion, and remote history of breast CA  She was currently undergoing palliative chemotherapy (FOLfox), and was followed by Dr Raffaele Henderson as an outpatient  On day of admission she completed day 3/3 of chemotherapy cycle  Following she developed persistent nausea and vomiting refractory to home Zofran  She was unable to tolerate any p  O  Intake therefore presented to the ED  In the ED there was no of increased work of breathing therefore chest x-ray was obtained concerning for right lower lobe pneumonia  She was therefore admitted to hospitalist service for treatment of intractable nausea and vomiting and aspiration pneumonia  Procedures Performed: No orders of the defined types were placed in this encounter  Summary of Hospital Course:      Patient was seen by a multi-disciplinary team throughout the course of her stay including palliative Care, Nephrology, Gastroenterology, and Infectious Disease  Despite aggressive antiemetic therapy including Zofran, Phenergan, Reglan and and trial of Decadron  On the evening of 7/5/19 patient became progressively short of breath with increased work of breathing    For this reason rapid response was called  Patient was found to be hypoxemic with O2 saturations in the low 80s  Patient was placed on high-flow nasal cannula and transferred to step-down unit  Patient required significant FiO2/flow rate support  She was adamant about code status to remain DNR/DNI  Antibiotics were broadened and aggressive chest physiotherapy was performed  Despite intervention patient remained dependent on high-flow nasal cannula; and continued to worsen from a respiratory standpoint  Given refractory nausea/vomiting was concern for ongoing aspiration  Goals of care discussion were held with patient and   Patient's initial intentions were to continue medical treatment in hopes of resolution of pneumonia over the course of the following days breathing became more labored and patient required maximum settings on NIPPV  After long/steroid discussion with patient in sound state of mind goals of care were transition toward means of comfort  Patient's family was notified of her decision  She was evaluated by hospice liaison however patient deemed too medically unstable to facilitate transfer to inpatient Hospice House  Spiritual support was offered to family however patient of non [employer] affiliation opting to decline  Patient passed peacefully during the evening hours of 19   was notified via phone  Our deepest condolences were offered to family  Family was informed to notify  home to facilitate ceremony arrangements  Attending notified  About    Significant Findings, Care, Treatment and Services Provided:       Complications:   Aspiration PNA    Disposition:      Final Diagnosis:   Principal Problem:    Intractable nausea and vomiting  Active Problems:    Acute hypoxic respiratory failure      Multifocal pneumonia    Toxic metabolic encephalopathy    Gastro-esophageal reflux disease without esophagitis    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 [facility])    Seizure disorder ([facility] )    Cancer associated pain    Moderate protein-calorie malnutrition ([facility] )    Hypokalemia/hypernatremia    Hypomagnesemia      Resolved Problems  Date Reviewed: 7/1/2019          Resolved    Acute respiratory failure with hypoxia ([facility] ) 7/3/2019     Resolved by  Edyta Koehler MD          Condition at Time of Death:  Peaceful      Date, Time and Cause of Death    Date of Death:  7/8/19  Time of Death:   7:22 PM  Preliminary Cause of Death:  Respiratory failure with hypoxia ([facility] )

## 2019-07-09 NOTE — UTILIZATION REVIEW
Notification of Discharge  This is a Notification of Discharge from our facility [address]  Please be advised that this patient has been discharge from our facility  Below you will find the admission and discharge date and time including the patients disposition  PRESENTATION DATE: 2019  6:09 PM  OBS ADMISSION DATE:   IP ADMISSION DATE: 19 112   DISCHARGE DATE: 2019  7:22 PM  DISPOSITION:     Admission Orders listed below:  Admission Orders (From admission, onward)    Ordered        19 1124  Inpatient Admission  Once         19 2337  Place in Observation  Once             Please contact the UR Department if additional information is required to close this patient's authorization/case  [address]  Utilization Review Department  Phone: [phone]; Fax [phone]  [email or web address]  ATTENTION: Please call with any questions or concerns to [phone]  and carefully listen to the prompts so that you are directed to the right person  Send all requests for admission clinical reviews, approved or denied determinations and any other requests to fax [phone]   All voicemails are confidential

## 2019-07-09 NOTE — DEATH NOTE
Death Pronouncement Note    Called to bedside by RN  Patient is identified visually and identification confirmed with identification bracelet  All lines and tubes intact  Patient unresponsive to stimuli  No spontaneous respirations  No palpable pulse or audible heart sounds  Pupils fixed bilaterally  Asystolic on two contiguous leads  Time of death: 19:22     [employer] notified via phone  Deepest condolences were offered  Family will not be coming in this evening  They were notified to contact  home  Attending notified  [place] office called by ELSA Rider PA-C

## 2019-07-10 ENCOUNTER — HOSPITAL ENCOUNTER (OUTPATIENT)
Dept: INFUSION CENTER | Facility: CLINIC | Age: 64
End: 2019-07-10